# Patient Record
Sex: FEMALE | Race: WHITE | NOT HISPANIC OR LATINO | Employment: STUDENT | URBAN - NONMETROPOLITAN AREA
[De-identification: names, ages, dates, MRNs, and addresses within clinical notes are randomized per-mention and may not be internally consistent; named-entity substitution may affect disease eponyms.]

---

## 2017-01-10 ENCOUNTER — OFFICE VISIT (OUTPATIENT)
Dept: OBGYN | Facility: OTHER | Age: 22
End: 2017-01-10
Attending: NURSE PRACTITIONER
Payer: COMMERCIAL

## 2017-01-10 VITALS
SYSTOLIC BLOOD PRESSURE: 112 MMHG | HEIGHT: 62 IN | RESPIRATION RATE: 15 BRPM | DIASTOLIC BLOOD PRESSURE: 64 MMHG | HEART RATE: 89 BPM | WEIGHT: 130 LBS | OXYGEN SATURATION: 100 % | BODY MASS INDEX: 23.92 KG/M2

## 2017-01-10 DIAGNOSIS — Z30.41 ENCOUNTER FOR SURVEILLANCE OF CONTRACEPTIVE PILLS: Primary | ICD-10-CM

## 2017-01-10 PROCEDURE — 99212 OFFICE O/P EST SF 10 MIN: CPT | Performed by: NURSE PRACTITIONER

## 2017-01-10 RX ORDER — NORGESTIMATE AND ETHINYL ESTRADIOL 7DAYSX3 28
KIT ORAL
Qty: 84 TABLET | Refills: 5 | Status: SHIPPED | OUTPATIENT
Start: 2017-01-10 | End: 2018-03-04

## 2017-01-10 ASSESSMENT — PAIN SCALES - GENERAL: PAINLEVEL: NO PAIN (0)

## 2017-01-10 NOTE — NURSING NOTE
"Chief Complaint   Patient presents with     Contraception       Initial /64 mmHg  Pulse 89  Resp 15  Ht 5' 2\" (1.575 m)  Wt 130 lb (58.968 kg)  BMI 23.77 kg/m2  SpO2 100% Estimated body mass index is 23.77 kg/(m^2) as calculated from the following:    Height as of this encounter: 5' 2\" (1.575 m).    Weight as of this encounter: 130 lb (58.968 kg).  BP completed using cuff size: daniel Calixto      "

## 2017-01-10 NOTE — MR AVS SNAPSHOT
"              After Visit Summary   1/10/2017    Alice Maria    MRN: [id number]           Patient Information     Date Of Birth          1995        Visit Information        Provider Department      1/10/2017 9:45 AM Lo Ann NP [facility]        Today's Diagnoses     Encounter for surveillance of contraceptive pills    -  1       Care Instructions    We reviewed how the birthcontrol pill works and possible expected side effects.  Specifically, she was informed of the irregular bleeding pattern that can occur when the pill is first started or a new form is changed over for the first 2-3 months. She was told to call the clinic if experiencing any ill side effects or abnormal bleeding pattern persisting after 3 months.  She was instructed to start the pill the day after her next menses begins.  We also reviewed need for condom use to prevent STI's and as back-up during use of antibiotics.        Follow-ups after your visit        Who to contact     If you have questions or need follow up information about today's clinic visit or your schedule please contact [facility] directly at [phone].  Normal or non-critical lab and imaging results will be communicated to you by MyChart, letter or phone within 4 business days after the clinic has received the results. If you do not hear from us within 7 days, please contact the clinic through UberGrape[employer] or phone. If you have a critical or abnormal lab result, we will notify you by phone as soon as possible.  Submit refill requests through [employer] or call your pharmacy and they will forward the refill request to us. Please allow 3 business days for your refill to be completed.          Additional Information About Your Visit        [employer] Information     [employer] lets you send messages to your doctor, view your test results, renew your prescriptions, schedule appointments and more. To sign up, go to [email or web address] . Click on \"Log " "in\" on the left side of the screen, which will take you to the Welcome page. Then click on \"Sign up Now\" on the right side of the page.     You will be asked to enter the access code listed below, as well as some personal information. Please follow the directions to create your username and password.     Your access code is: [email or web address]  Expires: 4/10/2017 10:11 AM     Your access code will  in 90 days. If you need help or a new code, please call your [place] clinic or [phone].        Care EveryWhere ID     This is your Care EveryWhere ID. This could be used by other organizations to access your [place] medical records  [id number]        Your Vitals Were     Pulse Respirations Height BMI (Body Mass Index) Pulse Oximetry       89 15 5' 2\" (1.575 m) 23.77 kg/m2 100%        Blood Pressure from Last 3 Encounters:   01/10/17 112/64   11/27/15 100/60   07/15/15 110/72    Weight from Last 3 Encounters:   01/10/17 130 lb (58.968 kg)   11/27/15 130 lb (58.968 kg) (55.48 %*)   07/15/15 132 lb (59.875 kg) (60.59 %*)     * Growth percentiles are based on CDC 2-20 Years data.              Today, you had the following     No orders found for display         Where to get your medicines      These medications were sent to [facility] [address] AT [facility]  [address] [facility] [address]     Phone:  [phone]    - norgestim-eth estrad triphasic 0.18/0.215/0.25 MG-35 MCG per tablet       Primary Care Provider Office Phone # Fax #    Laz Dalal [phone]       [facility] [address]  [facility] [address]        Thank you!     Thank you for choosing [facility]  for your care. Our goal is always to provide you with excellent care. Hearing back from our patients is one way we can continue to improve our services. Please take a few minutes to complete the written survey that you may receive in the mail after your visit " with us. Thank you!             Your Updated Medication List - Protect others around you: Learn how to safely use, store and throw away your medicines at www.disposemymeds.org.          This list is accurate as of: 1/10/17 10:11 AM.  Always use your most recent med list.                   Brand Name Dispense Instructions for use    EPINEPHrine 0.3 MG/0.3ML injection    EPIPEN    4 each    Inject 0.3 mLs (0.3 mg) into the muscle once as needed for anaphylaxis       fluticasone 50 MCG/ACT spray    FLONASE    1 Package    Spray 2 sprays into both nostrils daily       norgestim-eth estrad triphasic 0.18/0.215/0.25 MG-35 MCG per tablet    TRINESSA (28)    84 tablet    AS DIRECTED

## 2017-01-10 NOTE — PATIENT INSTRUCTIONS
We reviewed how the birthcontrol pill works and possible expected side effects.  Specifically, she was informed of the irregular bleeding pattern that can occur when the pill is first started or a new form is changed over for the first 2-3 months. She was told to call the clinic if experiencing any ill side effects or abnormal bleeding pattern persisting after 3 months.  She was instructed to start the pill the day after her next menses begins.  We also reviewed need for condom use to prevent STI's and as back-up during use of antibiotics.

## 2017-01-10 NOTE — PROGRESS NOTES
"[facility]                HPI   Doing well.  Denies concerns.  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  Sexually active and using condoms faithfully.  Declines std screening.  Periods regular and 4 days.  She wishes to stay on her oral BCP. Discussed plan to begin pap smear at age 22.  Immunizations are up to date.              Medications:     Current Outpatient Prescriptions Ordered in Epic   Medication     norgestim-eth estrad triphasic (TRINESSA, 28,) 0.18/0.215/0.25 MG-35 MCG per tablet     fluticasone (FLONASE) 50 MCG/ACT nasal spray     EPINEPHrine (EPIPEN) 0.3 MG/0.3ML injection     [DISCONTINUED] norgestim-eth estrad triphasic (TRINESSA, 28,) 0.18/0.215/0.25 MG-35 MCG per tablet     No current Epic-ordered facility-administered medications on file.                Allergies:   Sulfa drugs         Review of Systems:   The 5 point Review of Systems is negative other than noted in the HPI                     Physical Exam:   Blood pressure 112/64, pulse 89, resp. rate 15, height 5' 2\" (1.575 m), weight 130 lb (58.968 kg), SpO2 100 %, not currently breastfeeding.  Constitutional:   awake, alert, cooperative, no apparent distress, and appears stated age               Assessment and Plan:   Contraceptive management - BCP refilled.  Teaching provided.  discussed STI's and prevention.  Return after her birthday for pap and breast exam.      JOVANY Gonzales  1/10/2017  10:06 AM  "

## 2017-05-15 ENCOUNTER — APPOINTMENT (OUTPATIENT)
Dept: OCCUPATIONAL MEDICINE | Facility: OTHER | Age: 22
End: 2017-05-15

## 2017-05-15 PROCEDURE — 86580 TB INTRADERMAL TEST: CPT

## 2017-06-12 ENCOUNTER — ALLIED HEALTH/NURSE VISIT (OUTPATIENT)
Dept: ALLERGY | Facility: OTHER | Age: 22
End: 2017-06-12
Attending: FAMILY MEDICINE
Payer: COMMERCIAL

## 2017-06-12 DIAGNOSIS — Z13.9 SCREENING FOR CONDITION: Primary | ICD-10-CM

## 2017-06-12 PROCEDURE — 86580 TB INTRADERMAL TEST: CPT

## 2017-06-12 NOTE — MR AVS SNAPSHOT
"              After Visit Summary   6/12/2017    Alice Maria    MRN: [id number]           Patient Information     Date Of Birth          1995        Visit Information        Provider Department      6/12/2017 9:15 AM HC SHOT ROOM [facility]        Today's Diagnoses     Screening for condition    -  1       Follow-ups after your visit        Your next 10 appointments already scheduled     Jun 14, 2017  9:30 AM CDT   injection with HC SHOT ROOM   [facility] [place] ([facility] [place] [facility])    [address]  [place] [address]   [phone]            Jun 19, 2017  9:15 AM CDT   injection with HC SHOT ROOM   [facility] [place] ([facility] [place] [facility])    [address]  [place] [address]   [phone]            Jun 21, 2017  9:30 AM CDT   injection with HC SHOT ROOM   [facility] [place] ([facility] [place] [facility])    [address]  [place] [address]   [phone]              Who to contact     If you have questions or need follow up information about today's clinic visit or your schedule please contact [facility] directly at [phone].  Normal or non-critical lab and imaging results will be communicated to you by Quietymehart, letter or phone within 4 business days after the clinic has received the results. If you do not hear from us within 7 days, please contact the clinic through [employer] or phone. If you have a critical or abnormal lab result, we will notify you by phone as soon as possible.  Submit refill requests through [employer] or call your pharmacy and they will forward the refill request to us. Please allow 3 business days for your refill to be completed.          Additional Information About Your Visit        MyChart Information     [employer] lets you send messages to your doctor, view your test results, renew your prescriptions, schedule appointments and more. To sign up, go to [email or web address] . Click on \"Log in\" on the left side of the screen, which " "will take you to the Welcome page. Then click on \"Sign up Now\" on the right side of the page.     You will be asked to enter the access code listed below, as well as some personal information. Please follow the directions to create your username and password.     Your access code is: [email or web address]  Expires: 9/10/2017  9:15 AM     Your access code will  in 90 days. If you need help or a new code, please call your [place] clinic or [phone].        Care EveryWhere ID     This is your Care EveryWhere ID. This could be used by other organizations to access your [place] medical records  [id number]         Blood Pressure from Last 3 Encounters:   01/10/17 112/64   11/27/15 100/60   07/15/15 110/72    Weight from Last 3 Encounters:   01/10/17 130 lb (59 kg)   11/27/15 130 lb (59 kg) (55 %)*   07/15/15 132 lb (59.9 kg) (61 %)*     * Growth percentiles are based on CDC 2-20 Years data.              We Performed the Following     INJECTION INTRAMUSCULAR OR SUB-Q     TB INTRADERMAL TEST        Primary Care Provider Office Phone # Fax #    Laz Dalal [phone]       [facility] [address]  [facility] [address]        Thank you!     Thank you for choosing [facility]  for your care. Our goal is always to provide you with excellent care. Hearing back from our patients is one way we can continue to improve our services. Please take a few minutes to complete the written survey that you may receive in the mail after your visit with us. Thank you!             Your Updated Medication List - Protect others around you: Learn how to safely use, store and throw away your medicines at www.disposemymeds.org.          This list is accurate as of: 17  9:15 AM.  Always use your most recent med list.                   Brand Name Dispense Instructions for use    EPINEPHrine 0.3 MG/0.3ML injection    EPIPEN    4 each    Inject 0.3 mLs (0.3 mg) into the muscle once as needed for " anaphylaxis       fluticasone 50 MCG/ACT spray    FLONASE    1 Package    Spray 2 sprays into both nostrils daily       norgestim-eth estrad triphasic 0.18/0.215/0.25 MG-35 MCG per tablet    TRINESSA (28)    84 tablet    AS DIRECTED

## 2017-06-12 NOTE — NURSING NOTE
Prior to injection verified patient identity using patient's name and date of birth.  Per orders of Dr. Dalal, injection of mantoux serum  given by Kimberley Da Silva. Patient instructed to remain in clinic for 20 minutes afterwards, and to report any adverse reaction to me immediately, patient declined signed out AMA.      Kimberley Da Silva LPN

## 2017-08-09 ENCOUNTER — OFFICE VISIT (OUTPATIENT)
Dept: FAMILY MEDICINE | Facility: OTHER | Age: 22
End: 2017-08-09
Attending: FAMILY MEDICINE
Payer: COMMERCIAL

## 2017-08-09 VITALS
HEIGHT: 63 IN | SYSTOLIC BLOOD PRESSURE: 114 MMHG | DIASTOLIC BLOOD PRESSURE: 66 MMHG | WEIGHT: 135 LBS | BODY MASS INDEX: 23.92 KG/M2 | TEMPERATURE: 97 F | HEART RATE: 54 BPM

## 2017-08-09 DIAGNOSIS — R30.0 DYSURIA: Primary | ICD-10-CM

## 2017-08-09 LAB
ALBUMIN UR-MCNC: NEGATIVE MG/DL
APPEARANCE UR: CLEAR
BACTERIA #/AREA URNS HPF: ABNORMAL /HPF
BILIRUB UR QL STRIP: NEGATIVE
COLOR UR AUTO: ABNORMAL
GLUCOSE UR STRIP-MCNC: NEGATIVE MG/DL
HGB UR QL STRIP: NEGATIVE
KETONES UR STRIP-MCNC: NEGATIVE MG/DL
LEUKOCYTE ESTERASE UR QL STRIP: ABNORMAL
NITRATE UR QL: NEGATIVE
PH UR STRIP: 7 PH (ref 4.7–8)
RBC #/AREA URNS AUTO: 1 /HPF (ref 0–2)
SP GR UR STRIP: 1 (ref 1–1.03)
URN SPEC COLLECT METH UR: ABNORMAL
UROBILINOGEN UR STRIP-MCNC: NORMAL MG/DL (ref 0–2)
WBC #/AREA URNS AUTO: 1 /HPF (ref 0–2)

## 2017-08-09 PROCEDURE — 87086 URINE CULTURE/COLONY COUNT: CPT | Performed by: FAMILY MEDICINE

## 2017-08-09 PROCEDURE — 81001 URINALYSIS AUTO W/SCOPE: CPT | Performed by: FAMILY MEDICINE

## 2017-08-09 PROCEDURE — 87088 URINE BACTERIA CULTURE: CPT | Performed by: FAMILY MEDICINE

## 2017-08-09 PROCEDURE — 99213 OFFICE O/P EST LOW 20 MIN: CPT | Performed by: FAMILY MEDICINE

## 2017-08-09 PROCEDURE — 87186 SC STD MICRODIL/AGAR DIL: CPT | Performed by: FAMILY MEDICINE

## 2017-08-09 RX ORDER — CIPROFLOXACIN 250 MG/1
250 TABLET, FILM COATED ORAL 2 TIMES DAILY
Qty: 6 TABLET | Refills: 0 | Status: SHIPPED | OUTPATIENT
Start: 2017-08-09 | End: 2018-04-02

## 2017-08-09 RX ORDER — NITROFURANTOIN 25; 75 MG/1; MG/1
100 CAPSULE ORAL 2 TIMES DAILY
Qty: 14 CAPSULE | Refills: 0 | Status: SHIPPED | OUTPATIENT
Start: 2017-08-09 | End: 2018-04-02

## 2017-08-09 ASSESSMENT — ANXIETY QUESTIONNAIRES
1. FEELING NERVOUS, ANXIOUS, OR ON EDGE: NOT AT ALL
4. TROUBLE RELAXING: NOT AT ALL
5. BEING SO RESTLESS THAT IT IS HARD TO SIT STILL: NOT AT ALL
2. NOT BEING ABLE TO STOP OR CONTROL WORRYING: NOT AT ALL
3. WORRYING TOO MUCH ABOUT DIFFERENT THINGS: NOT AT ALL
7. FEELING AFRAID AS IF SOMETHING AWFUL MIGHT HAPPEN: NOT AT ALL
GAD7 TOTAL SCORE: 0
6. BECOMING EASILY ANNOYED OR IRRITABLE: NOT AT ALL

## 2017-08-09 ASSESSMENT — PATIENT HEALTH QUESTIONNAIRE - PHQ9: SUM OF ALL RESPONSES TO PHQ QUESTIONS 1-9: 1

## 2017-08-09 ASSESSMENT — PAIN SCALES - GENERAL: PAINLEVEL: NO PAIN (0)

## 2017-08-09 NOTE — PROGRESS NOTES
"SUBJECTIVE:  Alice Maria, 21 year old, female is seen urinary frequency and urgency. Present over the last 2 days.  No associated symptoms.    No fever, flank pain, nausea, vomiting, dysuria, hematuria, pneumaturia, nocturia, frequency, urgency, or incontinence.      Current Outpatient Prescriptions   Medication Sig Dispense Refill     norgestim-eth estrad triphasic (TRINESSA, 28,) 0.18/0.215/0.25 MG-35 MCG per tablet AS DIRECTED 84 tablet 5     fluticasone (FLONASE) 50 MCG/ACT nasal spray Spray 2 sprays into both nostrils daily 1 Package 11     EPINEPHrine (EPIPEN) 0.3 MG/0.3ML injection Inject 0.3 mLs (0.3 mg) into the muscle once as needed for anaphylaxis 4 each 2        Allergies   Allergen Reactions     Sulfa Drugs Rash     Sulfa (sulfonamide antibiotics)       Past Medical History:   Diagnosis Date     Allergic Rhinitis, Seasonal 4/15/2011     Asthma      Eczema 4/15/2011     Past Surgical History:   Procedure Laterality Date     PE tube placement  1997     Family History   Problem Relation Age of Onset     Asthma Mother      C.A.D. Maternal Grandmother      Lipids Maternal Grandmother      Other - See Comments Maternal Grandmother      ALENAAWILLY. Paternal Grandfather      Social History     Social History     Marital status: Single     Spouse name: N/A     Number of children: N/A     Years of education: N/A     Occupational History     Not on file.     Social History Main Topics     Smoking status: Never Smoker     Smokeless tobacco: Never Used     Alcohol use No     Drug use: Not on file     Sexual activity: Not on file     Other Topics Concern     Caffeine Concern No     Social History Narrative    Age 17 years 6 months    Primary language English                 Review Of Systems  Constitutional, HEENT, cardiovascular, pulmonary, gi and gu systems are negative, except as otherwise noted.      OBJECTIVE:/66  Pulse 54  Temp 97  F (36.1  C)  Ht 5' 2.75\" (1.594 m)  Wt 135 lb (61.2 kg)  BMI 24.11 " kg/m2      Exam:  Physical Exam   Constitutional: She is oriented to person, place, and time. She appears well-developed and well-nourished. No distress.   Neurological: She is alert and oriented to person, place, and time.   Psychiatric: She has a normal mood and affect.     Other exam not repeated    Labs:  [facility]/Nurse Visit on 05/26/2015   Component Date Value Ref Range Status     PPD Induration 05/28/2015 0  0 - 5 mm Final     PPD Redness 05/28/2015 0  mm Final       ASSESSMENT/PLAN:  Dysuria  UA confirms.  Cipro as written.  Macrobid as back up due to her traveling [place].  Routine follow up.  - UA with Microscopic reflex to Culture - [place]  - Urine Culture Aerobic Bacterial  - ciprofloxacin (CIPRO) 250 MG tablet; Take 1 tablet (250 mg) by mouth 2 times daily  - nitroFURantoin, macrocrystal-monohydrate, (MACROBID) 100 MG capsule; Take 1 capsule (100 mg) by mouth 2 times daily            Laz Dalal MD

## 2017-08-09 NOTE — MR AVS SNAPSHOT
"              After Visit Summary   2017    Alice Maria    MRN: [id number]           Patient Information     Date Of Birth          1995        Visit Information        Provider Department      2017 9:20 AM Laz Dalal MD [facility]        Today's Diagnoses     Dysuria    -  1      Care Instructions    Take Cipro twice daily          Follow-ups after your visit        Follow-up notes from your care team     Return if symptoms worsen or fail to improve.      Who to contact     If you have questions or need follow up information about today's clinic visit or your schedule please contact [facility] directly at [phone].  Normal or non-critical lab and imaging results will be communicated to you by Illumagearhart, letter or phone within 4 business days after the clinic has received the results. If you do not hear from us within 7 days, please contact the clinic through [employer] or phone. If you have a critical or abnormal lab result, we will notify you by phone as soon as possible.  Submit refill requests through [employer] or call your pharmacy and they will forward the refill request to us. Please allow 3 business days for your refill to be completed.          Additional Information About Your Visit        MyChart Information     [employer] lets you send messages to your doctor, view your test results, renew your prescriptions, schedule appointments and more. To sign up, go to [email or web address] . Click on \"Log in\" on the left side of the screen, which will take you to the Welcome page. Then click on \"Sign up Now\" on the right side of the page.     You will be asked to enter the access code listed below, as well as some personal information. Please follow the directions to create your username and password.     Your access code is: [email or web address]  Expires: 9/10/2017  9:15 AM     Your access code will  in 90 days. If you need help or a new code, please call your [place] " "clinic or [phone].        Care EveryWhere ID     This is your Care EveryWhere ID. This could be used by other organizations to access your [place] medical records  [id number]        Your Vitals Were     Pulse Temperature Height BMI (Body Mass Index)          54 97  F (36.1  C) 5' 2.75\" (1.594 m) 24.11 kg/m2         Blood Pressure from Last 3 Encounters:   08/09/17 114/66   01/10/17 112/64   11/27/15 100/60    Weight from Last 3 Encounters:   08/09/17 135 lb (61.2 kg)   01/10/17 130 lb (59 kg)   11/27/15 130 lb (59 kg) (55 %)*     * Growth percentiles are based on CDC 2-20 Years data.              We Performed the Following     UA with Microscopic reflex to Culture - [place]     Urine Culture Aerobic Bacterial          Today's Medication Changes          These changes are accurate as of: 8/9/17 11:59 PM.  If you have any questions, ask your nurse or doctor.               Start taking these medicines.        Dose/Directions    ciprofloxacin 250 MG tablet   Commonly known as:  CIPRO   Used for:  Dysuria   Started by:  Laz Dalal MD        Dose:  250 mg   Take 1 tablet (250 mg) by mouth 2 times daily   Quantity:  6 tablet   Refills:  0       nitroFURantoin (macrocrystal-monohydrate) 100 MG capsule   Commonly known as:  MACROBID   Used for:  Dysuria   Started by:  Laz Dalal MD        Dose:  100 mg   Take 1 capsule (100 mg) by mouth 2 times daily   Quantity:  14 capsule   Refills:  0            Where to get your medicines      These medications were sent to [facility] [place] - [address] [address]     Phone:  [phone]     nitroFURantoin (macrocrystal-monohydrate) 100 MG capsule         These medications were sent to [facility] [address] [place] - [address] AT [facility] [address], [place] [address]     Phone:  [phone]     ciprofloxacin 250 MG tablet                Primary Care Provider Office Phone # Fax #    " Laz Dalal [phone]       [facility] [address]  [facility] [address]        Equal Access to Services     DEREK WATKINS : Hadii chao lugo kam Amezcua, wacesarda ejvilma, benyta kaalmada risa, brian annabellein hayaan sugarmeo still layovanyladonna carrasco. So [facility] [phone].    ATENCIÓN: Si habla español, tiene a lewis disposición servicios gratuitos de asistencia lingüística. Llame al [phone].    We comply with applicable federal civil rights laws and [place] laws. We do not discriminate on the basis of race, color, national origin, age, disability sex, sexual orientation or gender identity.            Thank you!     Thank you for choosing [facility]  for your care. Our goal is always to provide you with excellent care. Hearing back from our patients is one way we can continue to improve our services. Please take a few minutes to complete the written survey that you may receive in the mail after your visit with us. Thank you!             Your Updated Medication List - Protect others around you: Learn how to safely use, store and throw away your medicines at www.disposemymeds.org.          This list is accurate as of: 8/9/17 11:59 PM.  Always use your most recent med list.                   Brand Name Dispense Instructions for use Diagnosis    ciprofloxacin 250 MG tablet    CIPRO    6 tablet    Take 1 tablet (250 mg) by mouth 2 times daily    Dysuria       EPINEPHrine 0.3 MG/0.3ML injection    EPIPEN    4 each    Inject 0.3 mLs (0.3 mg) into the muscle once as needed for anaphylaxis    Multiple respiratory allergies, Seasonal allergic rhinitis       fluticasone 50 MCG/ACT spray    FLONASE    1 Package    Spray 2 sprays into both nostrils daily    Seasonal allergic rhinitis, Multiple respiratory allergies       nitroFURantoin (macrocrystal-monohydrate) 100 MG capsule    MACROBID    14 capsule    Take 1 capsule (100 mg) by mouth 2 times daily    Dysuria       norgestim-eth estrad  triphasic 0.18/0.215/0.25 MG-35 MCG per tablet    TRINESSA (28)    84 tablet    AS DIRECTED    Encounter for surveillance of contraceptive pills

## 2017-08-09 NOTE — NURSING NOTE
"Chief Complaint   Patient presents with     UTI     frequency and urgency, no burning but discomfort, no abdominal or back pain, no fevers started yesterday       Initial /66  Pulse 54  Temp 97  F (36.1  C)  Ht 5' 2.75\" (1.594 m)  Wt 135 lb (61.2 kg)  BMI 24.11 kg/m2 Estimated body mass index is 24.11 kg/(m^2) as calculated from the following:    Height as of this encounter: 5' 2.75\" (1.594 m).    Weight as of this encounter: 135 lb (61.2 kg).  Medication Reconciliation: complete     Juan Lanza      "

## 2017-08-10 ASSESSMENT — ANXIETY QUESTIONNAIRES: GAD7 TOTAL SCORE: 0

## 2017-08-11 LAB
BACTERIA SPEC CULT: ABNORMAL
MICRO REPORT STATUS: ABNORMAL
MICROORGANISM SPEC CULT: ABNORMAL
SPECIMEN SOURCE: ABNORMAL

## 2017-11-03 ENCOUNTER — HOSPITAL ENCOUNTER (EMERGENCY)
Facility: HOSPITAL | Age: 22
Discharge: LEFT BEFORE DISCHARGE | End: 2017-11-03
Admitting: FAMILY MEDICINE
Payer: COMMERCIAL

## 2017-11-03 ENCOUNTER — TELEPHONE (OUTPATIENT)
Dept: FAMILY MEDICINE | Facility: OTHER | Age: 22
End: 2017-11-03

## 2017-11-03 VITALS
BODY MASS INDEX: 23.92 KG/M2 | SYSTOLIC BLOOD PRESSURE: 144 MMHG | TEMPERATURE: 98.4 F | HEIGHT: 62 IN | DIASTOLIC BLOOD PRESSURE: 64 MMHG | WEIGHT: 130 LBS | HEART RATE: 60 BPM | OXYGEN SATURATION: 100 % | RESPIRATION RATE: 12 BRPM

## 2017-11-03 PROCEDURE — 40000268 ZZH STATISTIC NO CHARGES

## 2017-11-03 NOTE — TELEPHONE ENCOUNTER
3:29 PM    Reason for Call: OVERBOOK    Patient is having the following symptoms: Nausea/dizziness for 1 days.    The patient is requesting an appointment for today with Dr Dalal.    Was an appointment offered for this call? No, pt wanted to be seen today but we did not have anything open  If yes : Appointment type              Date    Preferred method for responding to this message: Telephone Call  What is your phone number ?  [phone]    If we cannot reach you directly, may we leave a detailed response at the number you provided? Yes    Can this message wait until your PCP/provider returns, if unavailable today? Not applicable    Alma Monique

## 2017-11-03 NOTE — TELEPHONE ENCOUNTER
Please schedule patient for date/time: Unable to see. Please advise to visit ER/UC    Have patient go to ER/Urgent Care Center. Urgent Care hours are 9:30 am to 8 pm, open 7 days a week. Yes.    Provider will call patient.No.    Other:

## 2017-11-26 ENCOUNTER — HEALTH MAINTENANCE LETTER (OUTPATIENT)
Age: 22
End: 2017-11-26

## 2018-03-04 DIAGNOSIS — Z30.41 ENCOUNTER FOR SURVEILLANCE OF CONTRACEPTIVE PILLS: ICD-10-CM

## 2018-03-05 RX ORDER — NORGESTIMATE-ETHINYL ESTRADIOL 7DAYSX3 28
TABLET ORAL
Qty: 84 TABLET | Refills: 1 | Status: SHIPPED | OUTPATIENT
Start: 2018-03-05 | End: 2018-04-02

## 2018-03-05 NOTE — TELEPHONE ENCOUNTER
TRINESSA TABLETS 28S    Last Written Prescription Date:  1/10/2017  Last Fill Quantity: 84,   # refills: 5  Last Office Visit: 8/09/2017

## 2018-03-06 DIAGNOSIS — Z30.41 ENCOUNTER FOR SURVEILLANCE OF CONTRACEPTIVE PILLS: ICD-10-CM

## 2018-03-07 RX ORDER — NORGESTIMATE-ETHINYL ESTRADIOL 7DAYSX3 28
TABLET ORAL
Qty: 84 TABLET | Refills: 0 | Status: SHIPPED | OUTPATIENT
Start: 2018-03-07 | End: 2018-04-02

## 2018-04-02 ENCOUNTER — OFFICE VISIT (OUTPATIENT)
Dept: OBGYN | Facility: OTHER | Age: 23
End: 2018-04-02
Attending: ADVANCED PRACTICE MIDWIFE
Payer: COMMERCIAL

## 2018-04-02 VITALS
BODY MASS INDEX: 23.92 KG/M2 | HEIGHT: 62 IN | DIASTOLIC BLOOD PRESSURE: 74 MMHG | SYSTOLIC BLOOD PRESSURE: 118 MMHG | HEART RATE: 73 BPM | WEIGHT: 130 LBS | OXYGEN SATURATION: 99 %

## 2018-04-02 DIAGNOSIS — Z30.41 ENCOUNTER FOR SURVEILLANCE OF CONTRACEPTIVE PILLS: ICD-10-CM

## 2018-04-02 DIAGNOSIS — Z01.419 WELL WOMAN EXAM WITH ROUTINE GYNECOLOGICAL EXAM: Primary | ICD-10-CM

## 2018-04-02 DIAGNOSIS — Z12.4 SCREENING FOR CERVICAL CANCER: ICD-10-CM

## 2018-04-02 PROCEDURE — 99395 PREV VISIT EST AGE 18-39: CPT | Performed by: ADVANCED PRACTICE MIDWIFE

## 2018-04-02 PROCEDURE — 88142 CYTOPATH C/V THIN LAYER: CPT | Performed by: ADVANCED PRACTICE MIDWIFE

## 2018-04-02 RX ORDER — NORGESTIMATE AND ETHINYL ESTRADIOL 7DAYSX3 28
KIT ORAL
Qty: 84 TABLET | Refills: 4 | Status: SHIPPED | OUTPATIENT
Start: 2018-04-02 | End: 2018-12-12

## 2018-04-02 ASSESSMENT — ANXIETY QUESTIONNAIRES
GAD7 TOTAL SCORE: 1
IF YOU CHECKED OFF ANY PROBLEMS ON THIS QUESTIONNAIRE, HOW DIFFICULT HAVE THESE PROBLEMS MADE IT FOR YOU TO DO YOUR WORK, TAKE CARE OF THINGS AT HOME, OR GET ALONG WITH OTHER PEOPLE: NOT DIFFICULT AT ALL
3. WORRYING TOO MUCH ABOUT DIFFERENT THINGS: NOT AT ALL
6. BECOMING EASILY ANNOYED OR IRRITABLE: NOT AT ALL
7. FEELING AFRAID AS IF SOMETHING AWFUL MIGHT HAPPEN: NOT AT ALL
4. TROUBLE RELAXING: SEVERAL DAYS
1. FEELING NERVOUS, ANXIOUS, OR ON EDGE: NOT AT ALL
5. BEING SO RESTLESS THAT IT IS HARD TO SIT STILL: NOT AT ALL
2. NOT BEING ABLE TO STOP OR CONTROL WORRYING: NOT AT ALL

## 2018-04-02 ASSESSMENT — PAIN SCALES - GENERAL: PAINLEVEL: NO PAIN (0)

## 2018-04-02 NOTE — PROGRESS NOTES
FRANKLIN Jenkins is a 22 year old G0   Patient's last menstrual period was 03/29/2018.  Menses:  Cycles 28 When did they start 16 How many days bleed 4 days with one heavy pain very mild Contraception Combined oral contraceptive (RINKU).  Planning pregnancy: n  Concerns in addition to routine health maintenance?  Vegetarian .  GYNECOLOGIC HISTORY:   Surgery: n  History sexually transmitted infections:No STD history   Safe?  y  STI testing offered?  n  History of abnormal Pap smear: n  Problems with sex? (bleeding/pain)n  Family history of: breast cancer: n   Uterine cancer: n ovarian cancer: n   colon cancer: n    HEALTH MAINTENANCE:  Cholesterol: (No results found for: CHOL History of abnormal lipids: n  Mammo: n   Regular Self Breast Exams: n Calcium/Vitamin D or Vitamin: y Exercise running, swimming, biking    TSH: (No results found for: TSH )  Pap; (No results found for: PAP )    HISTORY:  Obstetric History     No data available        Past Medical History:   Diagnosis Date     Allergic Rhinitis, Seasonal 4/15/2011     Asthma      Eczema 4/15/2011     Past Surgical History:   Procedure Laterality Date     PE tube placement  1997     Family History   Problem Relation Age of Onset     Asthma Mother      C.A.D. Maternal Grandmother      Lipids Maternal Grandmother      Other - See Comments Maternal Grandmother      BAMBI.A.FATMATA. Paternal Grandfather      Social History     Social History     Marital status: Single     Spouse name: N/A     Number of children: N/A     Years of education: N/A     Social History Main Topics     Smoking status: Never Smoker     Smokeless tobacco: Never Used     Alcohol use No     Drug use: None     Sexual activity: Not Asked     Other Topics Concern     Caffeine Concern No     Social History Narrative    Age 17 years 6 months    Primary language English               Current Outpatient Prescriptions:      Ferrous Sulfate (IRON SUPPLEMENT PO), Take by mouth daily, Disp: , Rfl:      TRINESSA, Diana,  "0.18/0.215/0.25 MG-35 MCG per tablet, USE AS DIRECTED, Disp: 84 tablet, Rfl: 1     fluticasone (FLONASE) 50 MCG/ACT nasal spray, Spray 2 sprays into both nostrils daily, Disp: 1 Package, Rfl: 11     EPINEPHrine (EPIPEN) 0.3 MG/0.3ML injection, Inject 0.3 mLs (0.3 mg) into the muscle once as needed for anaphylaxis, Disp: 4 each, Rfl: 2     [DISCONTINUED] TRINESSA, 28, 0.18/0.215/0.25 MG-35 MCG per tablet, USE AS DIRECTED (Patient not taking: Reported on 4/2/2018), Disp: 84 tablet, Rfl: 0     Allergies   Allergen Reactions     Sulfa Drugs Rash     Sulfa (sulfonamide antibiotics)       Past medical, surgical, social and family history were reviewed and updated in [facility].    ROS:    CONSTITUTIONAL:     NEGATIVE for fever, chills, change in weight  INTEGUMENTARY/SKIN:       NEGATIVE for worrisome rashes, moles or lesions  EYES:     NEGATIVE for vision changes or irritation  ENT/MOUTH: NEGATIVE for ear, mouth and throat problems  RESP:     NEGATIVE for significant cough or SOB  CV:   NEGATIVE for chest pain, palpitations or peripheral edema  GI:     NEGATIVE for nausea, abdominal pain, heartburn, or change in bowel habits  :   NEGATIVE for frequency, dysuria, hematuria, vaginal discharge, or irregular bleeding,incontinence   MUSCULOSKELETAL:     NEGATIVE for significant arthralgias or myalgia  NEURO:      NEGATIVE for weakness, dizziness or paresthesias  ENDOCRINE:      NEGATIVE for temperature intolerance, skin/hair changes  PSYCHIATRIC:      NEGATIVE for changes in mood or affect.     EXAM:   /74 (BP Location: Left arm, Patient Position: Chair, Cuff Size: Adult Regular)  Pulse 73  Ht 5' 2\" (1.575 m)  Wt 130 lb (59 kg)  LMP 03/29/2018  SpO2 99%  BMI 23.78 kg/m2   BMI: Body mass index is 23.78 kg/(m^2).  Constitutional: healthy, alert and no distress  Head: Normocephalic. No masses, lesions, tenderness or abnormalities  Neck: Neck supple. Trachea midline. No adenopathy. Thyroid symmetric, normal size. "   Cardiovascular: RRR.   Respiratory: lungs clear   Breast: Breasts reveal mild symmetric fibrocystic densities, but there are no dominant, discrete, fixed or suspicious masses found.  Gastrointestinal: Abdomen soft, non-tender, non-distended. No masses, organomegaly.  :  Vulva:  No external lesions, normal female hair distribution, no inguinal adenopathy.    Urethra:  Midline, non-tender, well supported, no discharge  Vagina:  Moist, pink, no abnormal discharge, no lesions  Cervix: clean   Uterus:  Normal size, non-tender, freely mobile  Ovaries:  No masses appreciated  Rectal Exam: deferred  Musculoskeletal: extremities normal  Skin: no suspicious lesions or rashes  Psychiatric: Affect appropriate, cooperative,mentation appears normal.     COUNSELING:   regular exercise  healthy diet/nutrition  Immunizations   contraception  family planning  Folic Acid Counseling     reports that she has never smoked. She has never used smokeless tobacco.  Tobacco Cessation Action Plan: na  Body mass index is 23.78 kg/(m^2).  Weight management plan: continue to eat healthy and exercise  FRAX Risk Assessment    ASSESSMENT:  22 year old female with satisfactory annual exam  Need of cervical cancer screening  Need of breast cancer screening  Trinessa  PO daily  Vegetarian  Taking OTC iron  Hgb 13.3 in 11/17  Sexually active  Declines STI screening with education  Rec'd flu shot  PLAN:   Pap   Provider breast exam  Offered STI screening  OTC Folic acid 400-800 mcg daily  Refill RINKU    Return to office: 1 year for annual    Greater than 30 minutes spent discussing    SARAHI Ulloa, ALICE

## 2018-04-02 NOTE — MR AVS SNAPSHOT
"              After Visit Summary   4/2/2018    Alice Maria    MRN: [id number]           Patient Information     Date Of Birth          1995        Visit Information        Provider Department      4/2/2018 2:00 PM Jordan Reyes APRN CNM [facility]        Today's Diagnoses     Well woman exam with routine gynecological exam    -  1    Encounter for surveillance of contraceptive pills        Screening for cervical cancer          Care Instructions    Return in one year for annual visit and as needed.    Thank you for allowing Jordan MCCLAIN CNM and our OB team to participate in your care.  If you have a scheduling or an appointment question please contact [facility] Coordinator at their direct line [phone].   ALL nursing questions or concerns can be directed to your OB nurse at: [phone] - leah              Follow-ups after your visit        Who to contact     If you have questions or need follow up information about today's clinic visit or your schedule please contact [facility] directly at [phone].  Normal or non-critical lab and imaging results will be communicated to you by [email or web address], letter or phone within 4 business days after the clinic has received the results. If you do not hear from us within 7 days, please contact the clinic through Oneflaret or phone. If you have a critical or abnormal lab result, we will notify you by phone as soon as possible.  Submit refill requests through Groove or call your pharmacy and they will forward the refill request to us. Please allow 3 business days for your refill to be completed.          Additional Information About Your Visit        [email or web address] Information     Groove lets you send messages to your doctor, view your test results, renew your prescriptions, schedule appointments and more. To sign up, go to [email or web address] . Click on \"Log in\" on the left side of the screen, which will take you to the " "Welcome page. Then click on \"Sign up Now\" on the right side of the page.     You will be asked to enter the access code listed below, as well as some personal information. Please follow the directions to create your username and password.     Your access code is: [email or web address]  Expires: 2018  1:57 PM     Your access code will  in 90 days. If you need help or a new code, please call your [facility] clinic or [phone].        Care EveryWhere ID     This is your Care EveryWhere ID. This could be used by other organizations to access your [facility] medical records  [id number]        Your Vitals Were     Pulse Height Last Period Pulse Oximetry BMI (Body Mass Index)       73 5' 2\" (1.575 m) 2018 99% 23.78 kg/m2        Blood Pressure from Last 3 Encounters:   18 118/74   17 144/64   17 114/66    Weight from Last 3 Encounters:   18 130 lb (59 kg)   17 130 lb (59 kg)   17 135 lb (61.2 kg)              We Performed the Following     A pap thin layer screen only - recommended age 21 - 24 years          Today's Medication Changes          These changes are accurate as of 18  4:58 PM.  If you have any questions, ask your nurse or doctor.               These medicines have changed or have updated prescriptions.        Dose/Directions    norgestim-eth estrad triphasic 0.18/0.215/0.25 MG-35 MCG per tablet   Commonly known as:  TRINESSA (28)   This may have changed:  See the new instructions.   Used for:  Encounter for surveillance of contraceptive pills   Changed by:  Jordan Reyes APRN CNM        USE AS DIRECTED   Quantity:  84 tablet   Refills:  4            Where to get your medicines      These medications were sent to [facility] [address] [facility] [place] [address] AT [facility] [place] [address] [facility] [facility] [address]     Phone:  [phone]     norgestim-eth estrad triphasic 0.18/0.215/0.25 MG-35 MCG per tablet                Primary Care " Provider Office Phone # Fax #    Laz Dalal [phone]       [facility] [facility] [address]        Equal Access to Services     DEREK WATKINS : Hadii aad ku hadtrinashannan Zaraeugene, wacesarda angelesriya, benyta kamissael lodr, brian peresladonna wootenmeo still zaid carrasco. So [facility] [phone].    ATENCIÓN: Si habla español, tiene a lewis disposición servicios gratuitos de asistencia lingüística. Llame al [phone].    We comply with applicable federal civil rights laws and [place] laws. We do not discriminate on the basis of race, color, national origin, age, disability, sex, sexual orientation, or gender identity.            Thank you!     Thank you for choosing [facility]  for your care. Our goal is always to provide you with excellent care. Hearing back from our patients is one way we can continue to improve our services. Please take a few minutes to complete the written survey that you may receive in the mail after your visit with us. Thank you!             Your Updated Medication List - Protect others around you: Learn how to safely use, store and throw away your medicines at www.disposemymeds.org.          This list is accurate as of 4/2/18  4:58 PM.  Always use your most recent med list.                   Brand Name Dispense Instructions for use Diagnosis    EPINEPHrine 0.3 MG/0.3ML injection    EPIPEN    4 each    Inject 0.3 mLs (0.3 mg) into the muscle once as needed for anaphylaxis    Multiple respiratory allergies, Seasonal allergic rhinitis       fluticasone 50 MCG/ACT spray    FLONASE    1 Package    Spray 2 sprays into both nostrils daily    Seasonal allergic rhinitis, Multiple respiratory allergies       IRON SUPPLEMENT PO      Take by mouth daily        norgestim-eth estrad triphasic 0.18/0.215/0.25 MG-35 MCG per tablet    TRINESSA (28)    84 tablet    USE AS DIRECTED    Encounter for surveillance of contraceptive pills

## 2018-04-02 NOTE — PATIENT INSTRUCTIONS
Return in one year for annual visit and as needed.    Thank you for allowing Jordan MCCLAIN CNM and our OB team to participate in your care.  If you have a scheduling or an appointment question please contact [facility] Health Unit Coordinator at their direct line [phone].   ALL nursing questions or concerns can be directed to your OB nurse at: [phone] - Xiomy

## 2018-04-02 NOTE — NURSING NOTE
"Chief Complaint   Patient presents with     Gyn Exam     Contraception       Initial /74 (BP Location: Left arm, Patient Position: Chair, Cuff Size: Adult Regular)  Pulse 73  Ht 5' 2\" (1.575 m)  Wt 130 lb (59 kg)  LMP 03/29/2018  SpO2 99%  BMI 23.78 kg/m2 Estimated body mass index is 23.78 kg/(m^2) as calculated from the following:    Height as of this encounter: 5' 2\" (1.575 m).    Weight as of this encounter: 130 lb (59 kg).  Medication Reconciliation: inocencia Marte      "

## 2018-04-03 ASSESSMENT — ANXIETY QUESTIONNAIRES: GAD7 TOTAL SCORE: 1

## 2018-04-03 ASSESSMENT — PATIENT HEALTH QUESTIONNAIRE - PHQ9: SUM OF ALL RESPONSES TO PHQ QUESTIONS 1-9: 0

## 2018-04-09 LAB
COPATH REPORT: NORMAL
PAP: NORMAL

## 2018-08-15 ENCOUNTER — ALLIED HEALTH/NURSE VISIT (OUTPATIENT)
Dept: ALLERGY | Facility: OTHER | Age: 23
End: 2018-08-15
Attending: FAMILY MEDICINE
Payer: COMMERCIAL

## 2018-08-15 DIAGNOSIS — Z23 NEED FOR VACCINATION: Primary | ICD-10-CM

## 2018-08-15 PROCEDURE — 90716 VAR VACCINE LIVE SUBQ: CPT

## 2018-08-15 PROCEDURE — 90471 IMMUNIZATION ADMIN: CPT

## 2018-10-28 ENCOUNTER — HOSPITAL ENCOUNTER (EMERGENCY)
Facility: CLINIC | Age: 23
Discharge: HOME | End: 2018-10-28
Attending: EMERGENCY MEDICINE | Admitting: EMERGENCY MEDICINE
Payer: COMMERCIAL

## 2018-10-28 VITALS
BODY MASS INDEX: 23.78 KG/M2 | HEART RATE: 65 BPM | RESPIRATION RATE: 20 BRPM | OXYGEN SATURATION: 99 % | WEIGHT: 130 LBS | TEMPERATURE: 96.5 F | SYSTOLIC BLOOD PRESSURE: 122 MMHG | DIASTOLIC BLOOD PRESSURE: 80 MMHG

## 2018-10-28 DIAGNOSIS — S81.012A LACERATION OF LEFT KNEE, INITIAL ENCOUNTER: ICD-10-CM

## 2018-10-28 PROCEDURE — 99283 EMERGENCY DEPT VISIT LOW MDM: CPT | Performed by: EMERGENCY MEDICINE

## 2018-10-28 PROCEDURE — 99283 EMERGENCY DEPT VISIT LOW MDM: CPT | Mod: 25 | Performed by: EMERGENCY MEDICINE

## 2018-10-28 PROCEDURE — 12002 RPR S/N/AX/GEN/TRNK2.6-7.5CM: CPT | Performed by: EMERGENCY MEDICINE

## 2018-10-28 PROCEDURE — 12002 RPR S/N/AX/GEN/TRNK2.6-7.5CM: CPT | Mod: Z6 | Performed by: EMERGENCY MEDICINE

## 2018-10-28 RX ORDER — LIDOCAINE HYDROCHLORIDE 10 MG/ML
10 INJECTION, SOLUTION INFILTRATION; PERINEURAL ONCE
Status: DISCONTINUED | OUTPATIENT
Start: 2018-10-28 | End: 2018-10-28 | Stop reason: HOSPADM

## 2018-10-28 NOTE — ED AVS SNAPSHOT
[facility], Emergency Department    [address]    [facility] [place] [address]    Phone:  [phone]    Fax:  [phone]                                       Alice Maria   MRN: [id number]    Department:  [facility], Emergency Department   Date of Visit:  10/28/2018           Patient Information     Date Of Birth          1995        Your diagnoses for this visit were:     Laceration of left knee, initial encounter        You were seen by Jason England MD.        Discharge Instructions       Please have your sutures removed in 7 days.     Extremity Laceration: Stitches, Staples, or Tape  A laceration is a cut through the skin. If it is deep, it may require stitches or staples to close so it can heal. Minor cuts may be treated with surgical tape closures, or skin glue.  X-rays may be done if something may have entered the skin through the cut. You may also need a tetanus shot if you are not up to date on this vaccine.  Home care    Follow the healthcare provider s instructions on how to care for the cut.    Wash your hands with soap and warm water before and after caring for your wound. This is to help prevent infection.    Keep the wound clean and dry. If a bandage was applied and it becomes wet or dirty, replace it. Otherwise, leave it in place for the first 24 hours, then change it once a day or as directed.    If stitches or staples were used, clean the wound daily:  ? After removing the bandage, wash the area with soap and water. Use a wet cotton swab to loosen and remove any blood or crust that forms.  ? After cleaning, keep the wound clean and dry. Talk with your healthcare provider before putting any antibiotic ointment on the wound. Reapply the bandage.    You may remove the bandage to shower as usual after the first 24 hours, but don't soak the area in water (no swimming) until the stitches or staples are removed.    If surgical tape closures were used, keep the area clean and  dry. If it becomes wet, blot it dry with a towel. Let the surgical tape fall off on its own.    The healthcare provider may prescribe an antibiotic cream or ointment to prevent infection. He or she may also prescribe an antibiotic pill. Don't stop taking this medicine until you have finished it all or the provider tells you to stop.    The provider may also prescribe medicine for pain. Follow the instructions for taking these medicines.    Don't do activities that may reopen your wound.  Follow-up care  Follow up with your healthcare provider, or as advised. Most skin wounds heal within 10 days. But an infection may sometimes occur even with proper treatment. Check the wound daily for the signs of infection listed below. Stitches and staples should be removed within 7 to14 days. If surgical tape closures were used, you may remove them after 10 days if they have not fallen off by then.   When to seek medical advice  Call your healthcare provider right away if any of these occur:    Wound bleeding not controlled by direct pressure    Signs of infection, including increasing pain in the wound, increasing wound redness or swelling, or pus or bad odor coming from the wound    Fever of 100.4 F (38 C) or higher, or as directed by your healthcare provider    Stitches or staples come apart or fall out or surgical tape falls off before 7 days    Wound edges reopen    Wound changes colors    Numbness occurs around the wound     Decreased movement around the injured area  Date Last Reviewed: 7/1/2017 2000-2017 [employer]. [address] [place] [address]. All rights reserved. This information is not intended as a substitute for professional medical care. Always follow your healthcare professional's instructions.          24 Hour Appointment Hotline       To make an appointment at any [facility], call [phone] ([phone]). If you don't have a family doctor or clinic, we will help you  find one. [facility] clinics are conveniently located to serve the needs of you and your family.             Review of your medicines      Our records show that you are taking the medicines listed below. If these are incorrect, please call your family doctor or clinic.        Dose / Directions Last dose taken    EPINEPHrine 0.3 MG/0.3ML injection   Commonly known as:  EPIPEN   Dose:  0.3 mg   Quantity:  4 each        Inject 0.3 mLs (0.3 mg) into the muscle once as needed for anaphylaxis   Refills:  2        fluticasone 50 MCG/ACT spray   Commonly known as:  FLONASE   Dose:  2 spray   Quantity:  1 Package        Spray 2 sprays into both nostrils daily   Refills:  11        IRON SUPPLEMENT PO        Take by mouth daily   Refills:  0        norgestim-eth estrad triphasic 0.18/0.215/0.25 MG-35 MCG per tablet   Commonly known as:  TRINESSA (28)   Quantity:  84 tablet        USE AS DIRECTED   Refills:  4                Orders Needing Specimen Collection     None      Pending Results     No orders found from 10/26/2018 to 10/29/2018.            Pending Culture Results     No orders found from 10/26/2018 to 10/29/2018.            Pending Results Instructions     If you had any lab results that were not finalized at the time of your Discharge, you can call the ED Lab Result RN at [phone]. You will be contacted by this team for any positive Lab results or changes in treatment. The nurses are available 7 days a week from 10A to 6:30P.  You can leave a message 24 hours per day and they will return your call.        Thank you for choosing [facility]       Thank you for choosing [facility] for your care. Our goal is always to provide you with excellent care. Hearing back from our patients is one way we can continue to improve our services. Please take a few minutes to complete the written survey that you may receive in the mail after you visit with us. Thank you!        [employer] Information     [employer] lets you send messages to  "your doctor, view your test results, renew your prescriptions, schedule appointments and more. To sign up, go to [email or web address] . Click on \"Log in\" on the left side of the screen, which will take you to the Welcome page. Then click on \"Sign up Now\" on the right side of the page.     You will be asked to enter the access code listed below, as well as some personal information. Please follow the directions to create your username and password.     Your access code is: [email or web address]  Expires: 2018 11:53 AM     Your access code will  in 90 days. If you need help or a new code, please call your [facility] clinic or [phone].        Care EveryWhere ID     This is your Care EveryWhere ID. This could be used by other organizations to access your [facility] medical records  [id number]        Equal Access to Services     [facility]: Hadii chao Amezcua, warobert walls, angelina kaalmamitzi lord, brian mar . So [facility] [phone].    ATENCIÓN: Si habla español, tiene a lewis disposición servicios gratuitos de asistencia lingüística. Llame al [phone].    We comply with applicable federal civil rights laws and [place] laws. We do not discriminate on the basis of race, color, national origin, age, disability, sex, sexual orientation, or gender identity.            After Visit Summary       This is your record. Keep this with you and show to your community pharmacist(s) and doctor(s) at your next visit.                  "

## 2018-10-28 NOTE — ED AVS SNAPSHOT
[facility], Emergency Department    [address]    [facility] [address]    Phone:  [phone]    Fax:  [phone]                                       Alice Maria   MRN: [id number]    Department:  [facility], Emergency Department   Date of Visit:  10/28/2018           After Visit Summary Signature Page     I have received my discharge instructions, and my questions have been answered. I have discussed any challenges I see with this plan with the nurse or doctor.    ..........................................................................................................................................  Patient/Patient Representative Signature      ..........................................................................................................................................  Patient Representative Print Name and Relationship to Patient    ..................................................               ................................................  Date                                   Time    ..........................................................................................................................................  Reviewed by Signature/Title    ...................................................              ..............................................  Date                                               Time          22EPIC Rev 08/18

## 2018-10-29 NOTE — DISCHARGE INSTRUCTIONS
Please have your sutures removed in 7 days.     Extremity Laceration: Stitches, Staples, or Tape  A laceration is a cut through the skin. If it is deep, it may require stitches or staples to close so it can heal. Minor cuts may be treated with surgical tape closures, or skin glue.  X-rays may be done if something may have entered the skin through the cut. You may also need a tetanus shot if you are not up to date on this vaccine.  Home care    Follow the healthcare provider s instructions on how to care for the cut.    Wash your hands with soap and warm water before and after caring for your wound. This is to help prevent infection.    Keep the wound clean and dry. If a bandage was applied and it becomes wet or dirty, replace it. Otherwise, leave it in place for the first 24 hours, then change it once a day or as directed.    If stitches or staples were used, clean the wound daily:  ? After removing the bandage, wash the area with soap and water. Use a wet cotton swab to loosen and remove any blood or crust that forms.  ? After cleaning, keep the wound clean and dry. Talk with your healthcare provider before putting any antibiotic ointment on the wound. Reapply the bandage.    You may remove the bandage to shower as usual after the first 24 hours, but don't soak the area in water (no swimming) until the stitches or staples are removed.    If surgical tape closures were used, keep the area clean and dry. If it becomes wet, blot it dry with a towel. Let the surgical tape fall off on its own.    The healthcare provider may prescribe an antibiotic cream or ointment to prevent infection. He or she may also prescribe an antibiotic pill. Don't stop taking this medicine until you have finished it all or the provider tells you to stop.    The provider may also prescribe medicine for pain. Follow the instructions for taking these medicines.    Don't do activities that may reopen your wound.  Follow-up care  Follow up with  your healthcare provider, or as advised. Most skin wounds heal within 10 days. But an infection may sometimes occur even with proper treatment. Check the wound daily for the signs of infection listed below. Stitches and staples should be removed within 7 to14 days. If surgical tape closures were used, you may remove them after 10 days if they have not fallen off by then.   When to seek medical advice  Call your healthcare provider right away if any of these occur:    Wound bleeding not controlled by direct pressure    Signs of infection, including increasing pain in the wound, increasing wound redness or swelling, or pus or bad odor coming from the wound    Fever of 100.4 F (38 C) or higher, or as directed by your healthcare provider    Stitches or staples come apart or fall out or surgical tape falls off before 7 days    Wound edges reopen    Wound changes colors    Numbness occurs around the wound     Decreased movement around the injured area  Date Last Reviewed: 7/1/2017 2000-2017 [employer]. [address] [place] [address], [place] [address]. All rights reserved. This information is not intended as a substitute for professional medical care. Always follow your healthcare professional's instructions.
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
## 2018-12-12 DIAGNOSIS — Z30.41 ENCOUNTER FOR SURVEILLANCE OF CONTRACEPTIVE PILLS: ICD-10-CM

## 2018-12-12 RX ORDER — NORGESTIMATE AND ETHINYL ESTRADIOL 7DAYSX3 28
KIT ORAL
Qty: 84 TABLET | Refills: 4 | Status: SHIPPED | OUTPATIENT
Start: 2018-12-12 | End: 2020-10-08

## 2018-12-12 NOTE — TELEPHONE ENCOUNTER
Pt needs a new prescription of her birth control pills. She is switching pharmacies.   Thank you!

## 2019-01-09 ENCOUNTER — TRANSFERRED RECORDS (OUTPATIENT)
Dept: HEALTH INFORMATION MANAGEMENT | Facility: CLINIC | Age: 24
End: 2019-01-09

## 2019-09-03 ENCOUNTER — OFFICE VISIT (OUTPATIENT)
Dept: FAMILY MEDICINE | Facility: CLINIC | Age: 24
End: 2019-09-03
Payer: COMMERCIAL

## 2019-09-03 VITALS
OXYGEN SATURATION: 100 % | BODY MASS INDEX: 23.74 KG/M2 | DIASTOLIC BLOOD PRESSURE: 80 MMHG | RESPIRATION RATE: 16 BRPM | TEMPERATURE: 99.1 F | HEART RATE: 61 BPM | HEIGHT: 62 IN | WEIGHT: 129 LBS | SYSTOLIC BLOOD PRESSURE: 122 MMHG

## 2019-09-03 DIAGNOSIS — N10 PYELONEPHRITIS, ACUTE: Primary | ICD-10-CM

## 2019-09-03 LAB
ALBUMIN UR-MCNC: NEGATIVE MG/DL
APPEARANCE UR: CLEAR
BASOPHILS # BLD AUTO: 0 10E9/L (ref 0–0.2)
BASOPHILS NFR BLD AUTO: 0.5 %
BILIRUB UR QL STRIP: NEGATIVE
COLOR UR AUTO: YELLOW
DIFFERENTIAL METHOD BLD: NORMAL
EOSINOPHIL # BLD AUTO: 0.2 10E9/L (ref 0–0.7)
EOSINOPHIL NFR BLD AUTO: 2.4 %
ERYTHROCYTE [DISTWIDTH] IN BLOOD BY AUTOMATED COUNT: 12.7 % (ref 10–15)
GLUCOSE UR STRIP-MCNC: NEGATIVE MG/DL
HCT VFR BLD AUTO: 38.1 % (ref 35–47)
HGB BLD-MCNC: 13.2 G/DL (ref 11.7–15.7)
HGB UR QL STRIP: ABNORMAL
KETONES UR STRIP-MCNC: NEGATIVE MG/DL
LEUKOCYTE ESTERASE UR QL STRIP: NEGATIVE
LYMPHOCYTES # BLD AUTO: 2.8 10E9/L (ref 0.8–5.3)
LYMPHOCYTES NFR BLD AUTO: 42.6 %
MCH RBC QN AUTO: 31.4 PG (ref 26.5–33)
MCHC RBC AUTO-ENTMCNC: 34.6 G/DL (ref 31.5–36.5)
MCV RBC AUTO: 91 FL (ref 78–100)
MONOCYTES # BLD AUTO: 0.6 10E9/L (ref 0–1.3)
MONOCYTES NFR BLD AUTO: 8.3 %
NEUTROPHILS # BLD AUTO: 3.1 10E9/L (ref 1.6–8.3)
NEUTROPHILS NFR BLD AUTO: 46.2 %
NITRATE UR QL: NEGATIVE
PH UR STRIP: 5.5 PH (ref 5–7)
PLATELET # BLD AUTO: 272 10E9/L (ref 150–450)
RBC # BLD AUTO: 4.2 10E12/L (ref 3.8–5.2)
RBC #/AREA URNS AUTO: NORMAL /HPF
SOURCE: ABNORMAL
SP GR UR STRIP: <=1.005 (ref 1–1.03)
UROBILINOGEN UR STRIP-ACNC: 0.2 EU/DL (ref 0.2–1)
WBC # BLD AUTO: 6.6 10E9/L (ref 4–11)
WBC #/AREA URNS AUTO: NORMAL /HPF

## 2019-09-03 PROCEDURE — 81001 URINALYSIS AUTO W/SCOPE: CPT | Performed by: FAMILY MEDICINE

## 2019-09-03 PROCEDURE — 85025 COMPLETE CBC W/AUTO DIFF WBC: CPT | Performed by: FAMILY MEDICINE

## 2019-09-03 PROCEDURE — 80053 COMPREHEN METABOLIC PANEL: CPT | Performed by: FAMILY MEDICINE

## 2019-09-03 PROCEDURE — 36415 COLL VENOUS BLD VENIPUNCTURE: CPT | Performed by: FAMILY MEDICINE

## 2019-09-03 PROCEDURE — 87086 URINE CULTURE/COLONY COUNT: CPT | Performed by: FAMILY MEDICINE

## 2019-09-03 PROCEDURE — 99214 OFFICE O/P EST MOD 30 MIN: CPT | Performed by: FAMILY MEDICINE

## 2019-09-03 ASSESSMENT — MIFFLIN-ST. JEOR: SCORE: 1293.39

## 2019-09-03 NOTE — NURSING NOTE
"Chief Complaint   Patient presents with     RECHECK     kidney infection while in [place] started abx sunday night     initial /80 (BP Location: Left arm, Cuff Size: Adult Regular)   Pulse 61   Temp 99.1  F (37.3  C) (Oral)   Resp 16   Ht 1.575 m (5' 2\")   Wt 58.5 kg (129 lb)   SpO2 100%   BMI 23.59 kg/m   Estimated body mass index is 23.59 kg/m  as calculated from the following:    Height as of this encounter: 1.575 m (5' 2\").    Weight as of this encounter: 58.5 kg (129 lb).  BP completed using cuff size: regular.  L  arm      Health Maintenance that is potentially due pending provider review:  NONE    n/a    Nirmal Patel ma  "

## 2019-09-03 NOTE — PROGRESS NOTES
Subjective     Alice Maria is a 24 year old female who presents to clinic today for the following health issues:    HPI   ED/UC Followup:    Facility:  In [facility] [place] [facility]  Date of visit: 9/1/19  Reason for visit: uti  Current Status: side and back pain     Pt is here today to f/u on acute pyelonephritis -   She first noticed UTI symptoms start one week ago - while on a trip in [facility]  Went to pharmacy while in [facility] - given cranberry pill with other med - homeopathic and it seemed to help a little.  Pt had some hematuria -   And cloudy urine and it was improving slightly.  She states 5 days into the urine symptoms she started to develop right flank pain.    Saturday developed right side pain -   Found hospital in [facility] - Given IV tramadol and fluids  Had CT - she brings in records today - was able to review and translate - no stones  Appeared to be pyelonepthritis  She was send home with ofloxacin 200mg BID for 7 days  She was also given Rx for paracetamol with codeine    She has since had constipation but long hx of bowel issues  She now is feeling bad again - fatigue and some left upper abdominal pain.      Typically 2 per year    -------------------------------------    Patient Active Problem List   Diagnosis     Well woman exam with routine gynecological exam     Past Surgical History:   Procedure Laterality Date     PE tube placement  1997       Social History     Tobacco Use     Smoking status: Never Smoker     Smokeless tobacco: Never Used   Substance Use Topics     Alcohol use: No     Family History   Problem Relation Age of Onset     Asthma Mother      C.A.D. Maternal Grandmother      Lipids Maternal Grandmother      Other - See Comments Maternal Grandmother      BAMBI.AWILLY. Paternal Grandfather            -------------------------------------  Reviewed and updated as needed this visit by Provider  Tobacco  Allergies  Meds  Problems  Med Hx  Surg Hx  Fam Hx         Review of Systems   ROS COMP: Constitutional,  "HEENT, cardiovascular, pulmonary, GI, , musculoskeletal, neuro, skin, endocrine and psych systems are negative, except as otherwise noted.      Objective    /80 (BP Location: Left arm, Cuff Size: Adult Regular)   Pulse 61   Temp 99.1  F (37.3  C) (Oral)   Resp 16   Ht 1.575 m (5' 2\")   Wt 58.5 kg (129 lb)   SpO2 100%   BMI 23.59 kg/m    Body mass index is 23.59 kg/m .  Physical Exam   GENERAL: healthy, alert and no distress  ABDOMEN: Soft with normal BS and some mild tenderness but no g/r/r  Positive flank tenderness on the right    Diagnostic Test Results:  Labs reviewed in Epic  Results for orders placed or performed in visit on 09/03/19 (from the past 24 hour(s))   CBC with platelets and differential   Result Value Ref Range    WBC 6.6 4.0 - 11.0 10e9/L    RBC Count 4.20 3.8 - 5.2 10e12/L    Hemoglobin 13.2 11.7 - 15.7 g/dL    Hematocrit 38.1 35.0 - 47.0 %    MCV 91 78 - 100 fl    MCH 31.4 26.5 - 33.0 pg    MCHC 34.6 31.5 - 36.5 g/dL    RDW 12.7 10.0 - 15.0 %    Platelet Count 272 150 - 450 10e9/L    % Neutrophils 46.2 %    % Lymphocytes 42.6 %    % Monocytes 8.3 %    % Eosinophils 2.4 %    % Basophils 0.5 %    Absolute Neutrophil 3.1 1.6 - 8.3 10e9/L    Absolute Lymphocytes 2.8 0.8 - 5.3 10e9/L    Absolute Monocytes 0.6 0.0 - 1.3 10e9/L    Absolute Eosinophils 0.2 0.0 - 0.7 10e9/L    Absolute Basophils 0.0 0.0 - 0.2 10e9/L    Diff Method Automated Method    *UA reflex to Microscopic and Culture ([place] [facility] (except [place] and [place])   Result Value Ref Range    Color Urine Yellow     Appearance Urine Clear     Glucose Urine Negative NEG^Negative mg/dL    Bilirubin Urine Negative NEG^Negative    Ketones Urine Negative NEG^Negative mg/dL    Specific Gravity Urine <=1.005 1.003 - 1.035    Blood Urine Trace (A) NEG^Negative    pH Urine 5.5 5.0 - 7.0 pH    Protein Albumin Urine Negative NEG^Negative mg/dL    Urobilinogen Urine 0.2 0.2 - 1.0 EU/dL    Nitrite Urine Negative " NEG^Negative    Leukocyte Esterase Urine Negative NEG^Negative    Source Midstream Urine    Urine Microscopic   Result Value Ref Range    WBC Urine 0 - 5 OTO5^0 - 5 /HPF    RBC Urine O - 2 OTO2^O - 2 /HPF     Additional labs pending        Assessment & Plan     1. Pyelonephritis, acute  Discussed this is most likely healing but since feeling worse will check some labs  Discussed having culture in case the abx is not working  Will also have her stop the paracetamol with codeine as it is probably constipating  Plan to use senna for possible constipation  Advised warning signs and symptoms    - CBC with platelets and differential  - *UA reflex to Microscopic and Culture ([facility] (except [place] and [place])  - Urine Culture Aerobic Bacterial  - Comprehensive metabolic panel (BMP + Alb, Alk Phos, ALT, AST, Total. Bili, TP)  - Urine Microscopic       Pt will call or RTC if symptoms worsen or do not improve.      Return for If symptoms worsen or do not improve.    Pita Meléndez, DO  [facility]

## 2019-09-04 LAB
ALBUMIN SERPL-MCNC: 3.7 G/DL (ref 3.4–5)
ALP SERPL-CCNC: 57 U/L (ref 40–150)
ALT SERPL W P-5'-P-CCNC: 38 U/L (ref 0–50)
ANION GAP SERPL CALCULATED.3IONS-SCNC: 10 MMOL/L (ref 3–14)
AST SERPL W P-5'-P-CCNC: 27 U/L (ref 0–45)
BACTERIA SPEC CULT: NO GROWTH
BILIRUB SERPL-MCNC: 0.3 MG/DL (ref 0.2–1.3)
BUN SERPL-MCNC: 10 MG/DL (ref 7–30)
CALCIUM SERPL-MCNC: 8.7 MG/DL (ref 8.5–10.1)
CHLORIDE SERPL-SCNC: 105 MMOL/L (ref 94–109)
CO2 SERPL-SCNC: 23 MMOL/L (ref 20–32)
CREAT SERPL-MCNC: 0.65 MG/DL (ref 0.52–1.04)
GFR SERPL CREATININE-BSD FRML MDRD: >90 ML/MIN/{1.73_M2}
GLUCOSE SERPL-MCNC: 103 MG/DL (ref 70–99)
POTASSIUM SERPL-SCNC: 3.9 MMOL/L (ref 3.4–5.3)
PROT SERPL-MCNC: 7.7 G/DL (ref 6.8–8.8)
SODIUM SERPL-SCNC: 138 MMOL/L (ref 133–144)
SPECIMEN SOURCE: NORMAL

## 2019-09-04 NOTE — RESULT ENCOUNTER NOTE
Dear Alice,   Your test results are all back -   -All of your labs are normal.  Let us know if you have any questions.  -Pita Meléndez, DO

## 2020-01-21 ENCOUNTER — OFFICE VISIT (OUTPATIENT)
Dept: ALLERGY | Facility: CLINIC | Age: 25
End: 2020-01-21
Payer: COMMERCIAL

## 2020-01-21 VITALS
OXYGEN SATURATION: 100 % | DIASTOLIC BLOOD PRESSURE: 82 MMHG | SYSTOLIC BLOOD PRESSURE: 127 MMHG | BODY MASS INDEX: 25.28 KG/M2 | WEIGHT: 138.2 LBS | HEART RATE: 59 BPM

## 2020-01-21 DIAGNOSIS — L50.8 CHRONIC URTICARIA: Primary | ICD-10-CM

## 2020-01-21 DIAGNOSIS — T78.1XXA POLLEN-FOOD ALLERGY SYNDROME, INITIAL ENCOUNTER: ICD-10-CM

## 2020-01-21 DIAGNOSIS — L50.3 DERMATOGRAPHISM: ICD-10-CM

## 2020-01-21 LAB
ALBUMIN SERPL-MCNC: 3.8 G/DL (ref 3.4–5)
ALP SERPL-CCNC: 49 U/L (ref 40–150)
ALT SERPL W P-5'-P-CCNC: 28 U/L (ref 0–50)
ANION GAP SERPL CALCULATED.3IONS-SCNC: 4 MMOL/L (ref 3–14)
AST SERPL W P-5'-P-CCNC: 16 U/L (ref 0–45)
BASOPHILS # BLD AUTO: 0 10E9/L (ref 0–0.2)
BASOPHILS NFR BLD AUTO: 0.6 %
BILIRUB SERPL-MCNC: 0.3 MG/DL (ref 0.2–1.3)
BUN SERPL-MCNC: 11 MG/DL (ref 7–30)
CALCIUM SERPL-MCNC: 9 MG/DL (ref 8.5–10.1)
CHLORIDE SERPL-SCNC: 107 MMOL/L (ref 94–109)
CO2 SERPL-SCNC: 25 MMOL/L (ref 20–32)
CREAT SERPL-MCNC: 0.67 MG/DL (ref 0.52–1.04)
DIFFERENTIAL METHOD BLD: NORMAL
EOSINOPHIL # BLD AUTO: 0.1 10E9/L (ref 0–0.7)
EOSINOPHIL NFR BLD AUTO: 2.3 %
ERYTHROCYTE [DISTWIDTH] IN BLOOD BY AUTOMATED COUNT: 12.7 % (ref 10–15)
GFR SERPL CREATININE-BSD FRML MDRD: >90 ML/MIN/{1.73_M2}
GLUCOSE SERPL-MCNC: 82 MG/DL (ref 70–99)
HCT VFR BLD AUTO: 37.7 % (ref 35–47)
HGB BLD-MCNC: 12.9 G/DL (ref 11.7–15.7)
LYMPHOCYTES # BLD AUTO: 2 10E9/L (ref 0.8–5.3)
LYMPHOCYTES NFR BLD AUTO: 38.2 %
MCH RBC QN AUTO: 30.2 PG (ref 26.5–33)
MCHC RBC AUTO-ENTMCNC: 34.2 G/DL (ref 31.5–36.5)
MCV RBC AUTO: 88 FL (ref 78–100)
MONOCYTES # BLD AUTO: 0.5 10E9/L (ref 0–1.3)
MONOCYTES NFR BLD AUTO: 10.3 %
NEUTROPHILS # BLD AUTO: 2.6 10E9/L (ref 1.6–8.3)
NEUTROPHILS NFR BLD AUTO: 48.6 %
PLATELET # BLD AUTO: 267 10E9/L (ref 150–450)
POTASSIUM SERPL-SCNC: 4.3 MMOL/L (ref 3.4–5.3)
PROT SERPL-MCNC: 7.5 G/DL (ref 6.8–8.8)
RBC # BLD AUTO: 4.27 10E12/L (ref 3.8–5.2)
SODIUM SERPL-SCNC: 136 MMOL/L (ref 133–144)
TSH SERPL DL<=0.005 MIU/L-ACNC: 3.53 MU/L (ref 0.4–4)
WBC # BLD AUTO: 5.3 10E9/L (ref 4–11)

## 2020-01-21 PROCEDURE — 99204 OFFICE O/P NEW MOD 45 MIN: CPT | Performed by: ALLERGY & IMMUNOLOGY

## 2020-01-21 PROCEDURE — 80050 GENERAL HEALTH PANEL: CPT | Performed by: ALLERGY & IMMUNOLOGY

## 2020-01-21 PROCEDURE — 36415 COLL VENOUS BLD VENIPUNCTURE: CPT | Performed by: ALLERGY & IMMUNOLOGY

## 2020-01-21 NOTE — LETTER
"    1/21/2020         RE: Alice MITCHELL  [address] [facility] [address]        Dear Colleague,    Thank you for referring your patient, Alice MITCHELL, to the [facility]. Please see a copy of my visit note below.    Alice MITCHELL was seen in the Allergy Clinic at [facility]. The following are my recommendations regarding her Food Pollen Syndrome, Chronic Urticaria and Dermatographism    1. Begin cetirizine 10mg daily - may increase up to a maximum of 20mg twice daily for persistent symptoms. In the case of sedation recommend fexofenadine 180mg daily and up to 360mg twice daily.  2. Consider addition of H2 blocker and/or LTRA for persistent symptoms  3. Will check CBC, CMP, and TSH today  4. Recommend continued avoidance of raw apples and stone fruits  5. Follow-up in 3 months, sooner if needed      Alice MITCHELL is a 24 year old White female being seen today in consultation for hives. She reports having daily symptoms of hives since last September. The hives have occurred after physical contact or having pressure applied to her skin however she has also had \"random\" episodes as well. The hives occur at various times per day and typically last 10-30 minutes before resolving. Alice has had several episodes over the course of the day. The hives seem to be more prevalent later in the day. Approximately 2 weeks ago she took diphenhydramine for 3 days in a row and noticed that she had significantly fewer hives during this time. She has not otherwise taken antihistamines for these symptoms. Alice has not had any recent changes to her home or work environments, soaps, detergents, or personal care products, or diet. She reports that she used to get hives after petting her parents cat but this resolved after she moved out.    Alice has a history of allergic rhinitis. She was tested for allergies in high school and reports positive testing for allergies to cats, dogs, molds, and pollens. She " "has symptoms around animals as well as in the spring and summer months. Her symptoms consist of itchy eyes, sneezing, rhinorrhea, and nasal congestion. She was on subcutaneous immunotherapy treatment from 2195-2444 and was transitioned to sublingual immunotherapy for 1 year when she was attending college. Alice feels that immunotherapy was helpful however she is also not frequently around triggers such as animals. She does take cetirizine as needed during the spring and summer.    Alice inquired today about possible food allergies or sensitivities. She has had symptoms of mouth and throat itching after eating apples and stone fruits. If she peels the apples or eats these fruits in cooked/baked preparations she does not have symptoms. She also reports symptoms of gassiness and bloating after eating dairy products. She recently took an at-home test to help diagnose food sensitivities and states the results showed she is sensitive to \"cow's milk casein.\" Alice denies symptoms of hives, swelling, difficulty swallowing, cough, wheezing, vomiting, dizziness, or lightheadedness after consuming cow's milk.      Past Medical History:   Diagnosis Date     Allergic Rhinitis, Seasonal 4/15/2011     Asthma      Eczema 4/15/2011     Family History   Problem Relation Age of Onset     Asthma Mother      Allergies Mother      C.A.D. Maternal Grandmother      Lipids Maternal Grandmother      Other - See Comments Maternal Grandmother      CAMACHO. Paternal Grandfather      Allergies Father      Past Surgical History:   Procedure Laterality Date     PE tube placement  1997       ENVIRONMENTAL HISTORY: [place] The home is heated with a electric furnace and forced air. They do have central air conditioning. The patient's bedroom is furnished with feather/wool bedding or pillows, carpeting in bedroom, allergen pillowcase cover and fabric window coverings.  Pets inside the house include None. There " is no history of cockroach or mice infestation. There is/are 0 smokers in the house.  The house does not have a damp basement.     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 She lives with her spouse.  Her spouse is currently in school.    REVIEW OF SYSTEMS:  General: negative for weight gain. negative for weight loss. negative for changes in sleep.   Eyes: negative for itching. negative for redness. negative for tearing/watering. negative for vision changes  Ears: negative for fullness. negative for hearing loss. negative for dizziness.   Nose: negative for snoring.negative for changes in smell. negative for drainage.   Throat: negative for hoarseness. negative for sore throat. negative for trouble swallowing.   Lungs: negative for cough. negative for shortness of breath.negative for wheezing. negative for sputum production.   Cardiovascular: negative for chest pain. negative for swelling of ankles. negative for fast or irregular heartbeat.   Gastrointestinal: negative for nausea. negative for heartburn. negative for acid reflux.   Musculoskeletal: negative for joint pain. negative for joint stiffness. negative for joint swelling.   Neurologic: negative for seizures. negative for fainting. negative for weakness.   Psychiatric: negative for changes in mood. negative for anxiety.   Endocrine: negative for cold intolerance. negative for heat intolerance. negative for tremors.   Hematologic: negative for easy bruising. negative for easy bleeding.  Integumentary: positive  for rash. negative for scaling. negative for nail changes.       Current Outpatient Medications:      CRANBERRY PO, , Disp: , Rfl:      norgestim-eth estrad triphasic (TRINESSA, 28,) 0.18/0.215/0.25 MG-35 MCG tablet, USE AS DIRECTED, Disp: 84 tablet, Rfl: 4     EPINEPHrine (EPIPEN) 0.3 MG/0.3ML injection, Inject 0.3 mLs (0.3 mg) into the muscle once as needed for anaphylaxis (Patient not taking: Reported on  1/21/2020), Disp: 4 each, Rfl: 2     fluticasone (FLONASE) 50 MCG/ACT nasal spray, Spray 2 sprays into both nostrils daily (Patient not taking: Reported on 9/3/2019), Disp: 1 Package, Rfl: 11  Immunization History   Administered Date(s) Administered     DTAP (<7y) 08/23/2000     HPV 09/08/2010, 10/21/2010, 03/15/2011     HepB 1996, 1996, 02/11/1997     Influenza (IIV3) PF 09/16/2009, 01/17/2013     MMR 11/17/1997, 08/23/2000     Mantoux Tuberculin Skin Test 05/26/2015, 06/12/2017     Meningococcal (Bexsero ) 08/24/2016     Meningococcal (Menactra ) 08/09/2011, 08/24/2016     OPV, trivalent, live 1996, 1996, 11/13/1997     Poliovirus, inactivated (IPV) 08/23/2000     TDAP Vaccine (Boostrix) 08/22/2008     Tetramune (DtP/HIB) 1996, 1996, 02/11/1997, 11/13/1997     Varicella 08/15/2018     Allergies   Allergen Reactions     Sulfa Drugs Rash     Sulfa (sulfonamide antibiotics)       EXAM:   /82 (BP Location: Left arm, Patient Position: Sitting, Cuff Size: Adult Regular)   Pulse 59   Wt 62.7 kg (138 lb 3.2 oz)   SpO2 100%   BMI 25.28 kg/m     GENERAL APPEARANCE: alert, cooperative and not in distress  SKIN: urticarial lesion on anterior neck, right side of neck, right forearm, and right ankle. 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  HEAD: atraumatic, normocephalic  EYES: lids and lashes normal, conjunctivae and sclerae clear, pupils equal, round, reactive to light, EOM full and intact  ENT: no scars or lesions, nasal exam showed no discharge, swelling or lesions noted, otoscopy showed external auditory canals clear, tympanic membranes normal, tongue midline and normal, soft palate, uvula, and tonsils normal  NECK: no asymmetry, masses, or scars, supple without significant adenopathy  LUNGS: unlabored respirations, no intercostal retractions or accessory muscle use, clear to auscultation without rales or wheezes  HEART: regular rate and rhythm without murmurs and normal S1  and S2  MUSCULOSKELETAL: no musculoskeletal defects are noted  NEURO: no focal deficits noted  PSYCH: does not appear depressed or anxious    WORKUP: None    ASSESSMENT/PLAN:  Alice MITCHELL is a 24 year old female here for evaluation of allergies. For the last 3-4 months she has experienced daily symptoms of hives. When she has taken antihistamines the frequency of her symptoms significantly decreases. Other than pressure/skin contact Alice has not identified any particular triggers for her symptoms. The frequency and duration of her symptoms are consistent with chronic urticaria. The pathophysiology and management of chronic urticaria were reviewed. We also discussed that in the majority of cases a specific cause is not identified and food and environmental allergies are typically not associated with chronic urticaria.    Alice also reports symptoms that are consistent with allergic rhinoconjunctivitis. She was previously on allergen immunotherapy treatment and now takes antihistamines as needed for acute symptoms. Additionally she has had symptoms of mouth and throat itching after eating raw apples and stone fruits though is able to tolerate these in cooked preparations. 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 Alice also experiences gastrointestinal symptoms after consuming cow's milk. This is likely due to lactose intolerance and not a sign of a true food allergy. She was counseled that IgG testing is not helpful in the diagnosis of a food allergy and has not been shown to be of benefit in diagnosing food sensitivities or intolerances.    1. Begin cetirizine 10mg daily - may increase up to a maximum of 20mg twice daily for persistent symptoms. In the case of sedation recommend fexofenadine 180mg daily and up to 360mg twice daily.  2. Consider addition of H2 blocker and/or LTRA for persistent symptoms  3. Will check CBC, CMP, and TSH today  4.  Recommend continued avoidance of raw apples and stone fruits  5. Follow-up in 3 months, sooner if needed      Thank you for allowing me to participate in the care of Alice MITCHELL.      Neeraj Jalloh MD  Allergy/Immunology  [facility]      Chart documentation done in part with Dragon Voice Recognition Software. Although reviewed after completion, some word and grammatical errors may remain.    Again, thank you for allowing me to participate in the care of your patient.        Sincerely,        Neeraj Jalloh MD

## 2020-01-21 NOTE — PATIENT INSTRUCTIONS
If you have any questions regarding your allergies, asthma, or what we discussed during your visit today please call the allergy clinic or contact us via [employer].    [facility] Allergy RN Line: [phone]  [facility] Scheduling Line: [phone]  [facility] Scheduling Line: [phone]      1. Start taking 10mg of Zyrtec (cetirizine) daily. If you continue to have hives you can increase the dose to twice daily. Maximum dose is 20mg (2 tablets) twice daily.    2. If you become sleepy with this dose of Zyrtec (cetirizine) you can switch to Allegra (fexofenadine) and take up to 2 tablets twice daily.

## 2020-01-21 NOTE — PROGRESS NOTES
"Alice MITCHELL was seen in the Allergy Clinic at [facility]. The following are my recommendations regarding her Food Pollen Syndrome, Chronic Urticaria and Dermatographism    1. Begin cetirizine 10mg daily - may increase up to a maximum of 20mg twice daily for persistent symptoms. In the case of sedation recommend fexofenadine 180mg daily and up to 360mg twice daily.  2. Consider addition of H2 blocker and/or LTRA for persistent symptoms  3. Will check CBC, CMP, and TSH today  4. Recommend continued avoidance of raw apples and stone fruits  5. Follow-up in 3 months, sooner if needed      Alice MITCHELL is a 24 year old White female being seen today in consultation for hives. She reports having daily symptoms of hives since last September. The hives have occurred after physical contact or having pressure applied to her skin however she has also had \"random\" episodes as well. The hives occur at various times per day and typically last 10-30 minutes before resolving. Alice has had several episodes over the course of the day. The hives seem to be more prevalent later in the day. Approximately 2 weeks ago she took diphenhydramine for 3 days in a row and noticed that she had significantly fewer hives during this time. She has not otherwise taken antihistamines for these symptoms. Alice has not had any recent changes to her home or work environments, soaps, detergents, or personal care products, or diet. She reports that she used to get hives after petting her parents cat but this resolved after she moved out.    Alice has a history of allergic rhinitis. She was tested for allergies in high school and reports positive testing for allergies to cats, dogs, molds, and pollens. She has symptoms around animals as well as in the spring and summer months. Her symptoms consist of itchy eyes, sneezing, rhinorrhea, and nasal congestion. She was on subcutaneous immunotherapy treatment from 6008-3908 and was transitioned " "to sublingual immunotherapy for 1 year when she was attending college. Alice feels that immunotherapy was helpful however she is also not frequently around triggers such as animals. She does take cetirizine as needed during the spring and summer.    Alice inquired today about possible food allergies or sensitivities. She has had symptoms of mouth and throat itching after eating apples and stone fruits. If she peels the apples or eats these fruits in cooked/baked preparations she does not have symptoms. She also reports symptoms of gassiness and bloating after eating dairy products. She recently took an at-home test to help diagnose food sensitivities and states the results showed she is sensitive to \"cow's milk casein.\" Alice denies symptoms of hives, swelling, difficulty swallowing, cough, wheezing, vomiting, dizziness, or lightheadedness after consuming cow's milk.      Past Medical History:   Diagnosis Date     Allergic Rhinitis, Seasonal 4/15/2011     Asthma      Eczema 4/15/2011     Family History   Problem Relation Age of Onset     Asthma Mother      Allergies Mother      C.A.FATMATA. Maternal Grandmother      Lipids Maternal Grandmother      Other - See Comments Maternal Grandmother      CAMACHO. Paternal Grandfather      Allergies Father      Past Surgical History:   Procedure Laterality Date     PE tube placement  1997       ENVIRONMENTAL HISTORY: [place] The home is heated with a electric furnace and forced air. They do have central air conditioning. The patient's bedroom is furnished with feather/wool bedding or pillows, carpeting in bedroom, allergen pillowcase cover and fabric window coverings.  Pets inside the house include None. There is no history of cockroach or mice infestation. There is/are 0 smokers in the house.  The house does not have a damp basement.     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 She lives with her " spouse.  Her spouse is currently in school.    REVIEW OF SYSTEMS:  General: negative for weight gain. negative for weight loss. negative for changes in sleep.   Eyes: negative for itching. negative for redness. negative for tearing/watering. negative for vision changes  Ears: negative for fullness. negative for hearing loss. negative for dizziness.   Nose: negative for snoring.negative for changes in smell. negative for drainage.   Throat: negative for hoarseness. negative for sore throat. negative for trouble swallowing.   Lungs: negative for cough. negative for shortness of breath.negative for wheezing. negative for sputum production.   Cardiovascular: negative for chest pain. negative for swelling of ankles. negative for fast or irregular heartbeat.   Gastrointestinal: negative for nausea. negative for heartburn. negative for acid reflux.   Musculoskeletal: negative for joint pain. negative for joint stiffness. negative for joint swelling.   Neurologic: negative for seizures. negative for fainting. negative for weakness.   Psychiatric: negative for changes in mood. negative for anxiety.   Endocrine: negative for cold intolerance. negative for heat intolerance. negative for tremors.   Hematologic: negative for easy bruising. negative for easy bleeding.  Integumentary: positive  for rash. negative for scaling. negative for nail changes.       Current Outpatient Medications:      CRANBERRY PO, , Disp: , Rfl:      norgestim-eth estrad triphasic (TRINESSA, 28,) 0.18/0.215/0.25 MG-35 MCG tablet, USE AS DIRECTED, Disp: 84 tablet, Rfl: 4     EPINEPHrine (EPIPEN) 0.3 MG/0.3ML injection, Inject 0.3 mLs (0.3 mg) into the muscle once as needed for anaphylaxis (Patient not taking: Reported on 1/21/2020), Disp: 4 each, Rfl: 2     fluticasone (FLONASE) 50 MCG/ACT nasal spray, Spray 2 sprays into both nostrils daily (Patient not taking: Reported on 9/3/2019), Disp: 1 Package, Rfl: 11  Immunization History   Administered Date(s)  Administered     DTAP (<7y) 08/23/2000     HPV 09/08/2010, 10/21/2010, 03/15/2011     HepB 1996, 1996, 02/11/1997     Influenza (IIV3) PF 09/16/2009, 01/17/2013     MMR 11/17/1997, 08/23/2000     Mantoux Tuberculin Skin Test 05/26/2015, 06/12/2017     Meningococcal (Bexsero ) 08/24/2016     Meningococcal (Menactra ) 08/09/2011, 08/24/2016     OPV, trivalent, live 1996, 1996, 11/13/1997     Poliovirus, inactivated (IPV) 08/23/2000     TDAP Vaccine (Boostrix) 08/22/2008     Tetramune (DtP/HIB) 1996, 1996, 02/11/1997, 11/13/1997     Varicella 08/15/2018     Allergies   Allergen Reactions     Sulfa Drugs Rash     Sulfa (sulfonamide antibiotics)       EXAM:   /82 (BP Location: Left arm, Patient Position: Sitting, Cuff Size: Adult Regular)   Pulse 59   Wt 62.7 kg (138 lb 3.2 oz)   SpO2 100%   BMI 25.28 kg/m    GENERAL APPEARANCE: alert, cooperative and not in distress  SKIN: urticarial lesion on anterior neck, right side of neck, right forearm, and right ankle. 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  HEAD: atraumatic, normocephalic  EYES: lids and lashes normal, conjunctivae and sclerae clear, pupils equal, round, reactive to light, EOM full and intact  ENT: no scars or lesions, nasal exam showed no discharge, swelling or lesions noted, otoscopy showed external auditory canals clear, tympanic membranes normal, tongue midline and normal, soft palate, uvula, and tonsils normal  NECK: no asymmetry, masses, or scars, supple without significant adenopathy  LUNGS: unlabored respirations, no intercostal retractions or accessory muscle use, clear to auscultation without rales or wheezes  HEART: regular rate and rhythm without murmurs and normal S1 and S2  MUSCULOSKELETAL: no musculoskeletal defects are noted  NEURO: no focal deficits noted  PSYCH: does not appear depressed or anxious    WORKUP: None    ASSESSMENT/PLAN:  Alice MITCHELL is a 24 year old female here for evaluation of  allergies. For the last 3-4 months she has experienced daily symptoms of hives. When she has taken antihistamines the frequency of her symptoms significantly decreases. Other than pressure/skin contact Alice has not identified any particular triggers for her symptoms. The frequency and duration of her symptoms are consistent with chronic urticaria. The pathophysiology and management of chronic urticaria were reviewed. We also discussed that in the majority of cases a specific cause is not identified and food and environmental allergies are typically not associated with chronic urticaria.    Alice also reports symptoms that are consistent with allergic rhinoconjunctivitis. She was previously on allergen immunotherapy treatment and now takes antihistamines as needed for acute symptoms. Additionally she has had symptoms of mouth and throat itching after eating raw apples and stone fruits though is able to tolerate these in cooked preparations. 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 Alice also experiences gastrointestinal symptoms after consuming cow's milk. This is likely due to lactose intolerance and not a sign of a true food allergy. She was counseled that IgG testing is not helpful in the diagnosis of a food allergy and has not been shown to be of benefit in diagnosing food sensitivities or intolerances.    1. Begin cetirizine 10mg daily - may increase up to a maximum of 20mg twice daily for persistent symptoms. In the case of sedation recommend fexofenadine 180mg daily and up to 360mg twice daily.  2. Consider addition of H2 blocker and/or LTRA for persistent symptoms  3. Will check CBC, CMP, and TSH today  4. Recommend continued avoidance of raw apples and stone fruits  5. Follow-up in 3 months, sooner if needed      Thank you for allowing me to participate in the care of Alice MITCHELL.      Neeraj Jalloh MD  Allergy/Immunology  [place]  [facility]      Chart documentation done in part with Dragon Voice Recognition Software. Although reviewed after completion, some word and grammatical errors may remain.

## 2020-03-02 ENCOUNTER — HEALTH MAINTENANCE LETTER (OUTPATIENT)
Age: 25
End: 2020-03-02

## 2020-03-17 ENCOUNTER — VIRTUAL VISIT (OUTPATIENT)
Dept: FAMILY MEDICINE | Facility: OTHER | Age: 25
End: 2020-03-17

## 2020-03-17 NOTE — PROGRESS NOTES
"Date: 2020 07:57:18  Clinician: Sami Samayoa  Clinician NPI: [id number]  Patient: Alice Kelly  Patient : 1995  Patient Address: [address] [place] [address]  Patient Phone: [phone]  Visit Protocol: UTI  Patient Summary:  Alice is a 24 year old ( : 1995 ) female who initiated a Visit for a presumed bladder infection. When asked the question \"Please sign me up to receive news, health information and promotions. \", Alice responded \"No\".   Her symptoms started today and consist of dysuria, foul-smelling urine, feeling as if the bladder is never empty, urinary frequency, and urgency.   Symptom details   Urine color: The color of her urine is yellow.    Denied symptoms include nausea, flank pain, abdominal pain, chills, vaginal discharge, urinary incontinence, vomiting, and vaginal itching. She does not feel feverish.   Over-the-counter medications or home remedies used to relieve the current symptoms as reported by the patient (free text): cranberry, phenazopyradine   Precipitating events  Alice denies having a sexually transmitted disease.  Pertinent medical history  Alice has had a bladder infection before and has had 1 in the past 12 months. Her most recent bladder infection was not within the last 4 weeks. Her current symptoms are similar to her previous bladder infection symptoms.   Nitrofurantoin (Macrobid) and ciprofloxacin (Cipro) has been effective in treating her past bladder infections.   She has experienced problems or side effects with the following antibiotics in the past: sulfamethoxazole-trimethoprim (Bactrim DS).  Problems or side effects as reported by the patient (free text): richa Jenkins has not been prescribed antibiotics to prevent frequent or repeated bladder infections in the past and does not get yeast infections when she takes antibiotics.   Alice does not have a history of kidney stones. She has not used a catheter or been a patient in a hospital " or nursing home in the past 2 weeks.   Alice does not smoke or use smokeless tobacco.   She denies pregnancy and denies breastfeeding. She is currently menstruating.     MEDICATIONS: cetirizine oral, ALLERGIES: Sulfa (Sulfonamide Antibiotics)  Clinician Response:  Dear Alice,  Based on the information you have provided, you likely have an acute urinary tract infection, also called a bladder infection. Bladder infections occur when bacteria from the outside of the body enters the urinary tract. Any part of the urinary system can be infected, but the bladder is the most common.  Medication information  I am prescribing:     Nitrofurantoin monohyd/m-cryst (Macrobid) 100 mg oral capsule. Take 1 capsule by mouth every 12 hours for 5 days. Take this medication with food. There are no refills with this prescription.   The medication I prescribed for your bladder infection is an antibiotic. Continue taking the medication until it is gone even if you feel better.   Yeast infections can be a common side effect of antibiotics. The most common symptom of a yeast infection is itchiness in and around the vagina. Other signs and symptoms include burning, redness, or a thick, white vaginal discharge that looks like cottage cheese and does not have a bad smell.  Self care  Urination helps to flush bacteria from the urinary tract. For this reason, drinking water and urinating often helps relieve some urinary symptoms and can decrease your risk of getting bladder infections in the future.  Other steps you can take to prevent future bladder infections include:     Wipe front to back after using the bathroom    Urinate after sexual intercourse    Avoid using deodorant sprays, douches, or powders in the vaginal area     When to seek care  Please make an appointment to be seen in a clinic or urgent care if any of the following occur:     You develop new symptoms or your symptoms become worse    You have medication side effects that make  it difficult to take them as prescribed    Your symptoms do not improve within 1-2 days of starting treatment    You have symptoms of a bladder infection that return shortly after completing treatment     It is possible to have an allergic reaction to an antibiotic even if you have not had one in the past. If you notice a new rash, significant swelling, or difficulty breathing, stop taking this medication immediately and go to a clinic or urgent care.   Diagnosis: Acute uncomplicated bladder infection  Diagnosis ICD: N39.0  Prescription: nitrofurantoin monohyd/m-cryst (Macrobid) 100 mg oral capsule 10 capsule, 5 days supply. Take 1 capsule by mouth every 12 hours for 5 days. Refills: 0, Refill as needed: no, Allow substitutions: yes  Pharmacy: [id number] - [phone] - [address] [place] [address]

## 2020-04-14 ENCOUNTER — VIRTUAL VISIT (OUTPATIENT)
Dept: FAMILY MEDICINE | Facility: OTHER | Age: 25
End: 2020-04-14

## 2020-04-14 ENCOUNTER — NURSE TRIAGE (OUTPATIENT)
Dept: NURSING | Facility: CLINIC | Age: 25
End: 2020-04-14

## 2020-04-14 NOTE — TELEPHONE ENCOUNTER
Say is having a bad asthma flare up and cough and tightness in chest.  Denies fever and denies shortness of breath.  Usually say uses a rescue inhaler Albuterol inhaler.  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  Say feels that inhaler is not helping like it use to.  Symptoms have been present for two days.  Cough is not keeping Say up at night.      COVID 19 Nurse Triage Plan/Patient Instructions    Please be aware that novel coronavirus (COVID-19) may be circulating in the community. If you develop symptoms such as fever, cough, or SOB or if you have concerns about the presence of another infection including coronavirus (COVID-19), please contact your health care provider or visit [email or web address].     Disposition/Instructions    Patient to have an [facility] Visit with a provider (Preferred option). Follow System Ambulatory Workflow for COVID 19.     To do this follow these instructions:    1. Go to the website [email or web address]  2. Create an account (you will need your insurance information)  3. Start a new visit  4. Choose your diagnosis (e.g. COVID19)  5. Fill out the information about your symptoms  6. A provider will reach out to you by text, phone call or video visit based on your request    Call Back If: Your symptoms worsen before you are able to complete your [facility] Visit with a provider.    Thank you for limiting contact with others, wearing a simple mask to cover your cough, practice good hand hygiene habits and accessing our virtual services where possible to limit the spread of this virus.    For more information about COVID19 and options for caring for yourself at home, please visit the CDC website at https://www.cdc.gov/coronavirus/2019-ncov/about/steps-when-sick.html  For more options for care at [facility], please visit our website at [email or web address]    For more information, please use the [place] Department of Health ([facility]) COVID-19 Hotlines  (Interpreters available):     Health questions: Phone Number: [phone] or 1-420.319.1987 and Hours: 7 a.m. to 7 p.m.    Schools and  questions: Phone Number: [phone] or [phone] and Hours 7 a.m. to 7 p.m.                  Reason for Disposition    1] COVID-19 infection diagnosed or suspected AND [2] mild symptoms (fever, cough) AND [2] no trouble breathing or other complications    Additional Information    Negative: SEVERE difficulty breathing (e.g., struggling for each breath, speaks in single words)    Negative: Difficult to awaken or acting confused (e.g., disoriented, slurred speech)    Negative: Bluish (or gray) lips or face now    Negative: Shock suspected (e.g., cold/pale/clammy skin, too weak to stand, low BP, rapid pulse)    Negative: Sounds like a life-threatening emergency to the triager    Negative: SEVERE or constant chest pain (Exception: mild central chest pain, present only when coughing)    Negative: MODERATE difficulty breathing (e.g., speaks in phrases, SOB even at rest, pulse 100-120)    Negative: Patient sounds very sick or weak to the triager    Negative: MILD difficulty breathing (e.g., minimal/no SOB at rest, SOB with walking, pulse <100)    Negative: Chest pain    Negative: Fever > 103 F (39.4 C)    Negative: [1] Fever > 101 F (38.3 C) AND [2] age > 60    Negative: [1] Fever > 100.0 F (37.8 C) AND [2] bedridden (e.g., nursing home patient, CVA, chronic illness, recovering from surgery)    Negative: HIGH RISK patient (e.g., age > 64 years, diabetes, heart or lung disease, weak immune system)    Negative: Fever present > 3 days (72 hours)    Negative: [1] Fever returns after gone for over 24 hours AND [2] symptoms worse or not improved    Negative: [1] Continuous (nonstop) coughing interferes with work or school AND [2] no improvement using cough treatment per protocol    Negative: Cough present > 3 weeks    Protocols used: CORONAVIRUS (COVID-19) DIAGNOSED OR  [id number] 3.30.20

## 2020-04-14 NOTE — PROGRESS NOTES
"Date: 2020 08:57:59  Clinician: Renato Wallace  Clinician NPI: [id number]  Patient: Alice Kelly  Patient : 1995  Patient Address: [address] [place] [address]  Patient Phone: [phone]  Visit Protocol: URI  Patient Summary:  Alice is a 24 year old ( : 1995 ) female who initiated a Visit for COVID-19 (Coronavirus) evaluation and screening. When asked the question \"Please sign me up to receive news, health information and promotions. \", Alice responded \"No\".    Alice states her symptoms started 1-2 days ago.   Her symptoms consist of a cough.   Symptom details   Cough: Alice coughs every 5-10 minutes and her cough is more bothersome at night. Phlegm does not come into her throat when she coughs. She believes her cough is caused by post-nasal drip.    Alice denies having rhinitis, facial pain or pressure, myalgias, enlarged lymph nodes, chills, diarrhea, vomiting, nausea, teeth pain, headache, fever, wheezing, sore throat, nasal congestion, malaise, and ear pain. She also denies having recent facial or sinus surgery in the past 60 days. She is not experiencing dyspnea.   Precipitating events  She has not recently been exposed to someone with influenza. Alice has not been in close contact with any high risk individuals.   Pertinent COVID-19 (Coronavirus) information  Alice has not traveled internationally or to the areas where COVID-19 (Coronavirus) is widespread, including cruise ship travel in the last 14 days before the start of her symptoms.   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   Alice has not had a close contact with a laboratory-confirmed COVID-19 patient within 14 days of symptom onset. She also has not had a close contact with a suspected " COVID-19 patient within 14 days of symptom onset.   Pertinent medical history  Alice has taken an antibiotic medication in the past month. Antibiotic details as reported by the patient (free text): Nitrofurantoin; UTI   Alice does not get yeast infections when she takes antibiotics.   Alice does not need a return to work/school note.   Weight: 135 lbs   Alice does not smoke or use smokeless tobacco.   She denies pregnancy and denies breastfeeding. She has menstruated in the past month.   Additional information as reported by the patient (free text): I have asthma; this feels exactly like a flare up. However, the triage nurse said because I have cough, I must go through this site. Thanks   Weight: 135 lbs    MEDICATIONS: cetirizine oral, ALLERGIES: Sulfa (Sulfonamide Antibiotics)  Clinician Response:  Dear Alice,   Dear Alice  Your symptoms show that you may have coronavirus (COVID-19). This illness can cause fever, cough and trouble breathing. Many people get a mild case and get better on their own. Some people can get very sick.   Will I be tested for COVID-19?  Because the virus is spreading, we are no longer testing most patients. You may request testing if:   You are very ill. For example, you're on chemotherapy, dialysis or home hospice care. (Contact your specialty clinic or program.)   You live in a nursing home or other long-term care facility. (Talk to your nurse manager or medical director.)   You're a health care worker. (Contact your employee health office.)   How can I protect others?  Without a test, we can't know for sure that you have COVID-19. For safety, it's very important to follow these rules.  First, stay home and away from others (self-isolate) until:   You've had no fever---and no medicine that reduces fever---for 3 full days (72 hours). And...    Your other symptoms have gotten better. For example, your cough or breathing has improved. And...   At least 7 days have passed since your symptoms  started.   During this time:   Don't go to work, school or anywhere else.    Stay away from others in your home. No hugging, kissing or shaking hands.   Don't let anyone visit.   Cover your mouth and nose with a mask, tissue or wash cloth to avoid spreading germs.   Wash your hands and face often. Use soap and water.   How can I take care of myself?   1.Take Tylenol (acetaminophen) for fever or pain. If you have liver or kidney problems, ask your family doctor if it's okay to take Tylenol.        Adults can take either:    650 mg (two 325 mg pills) every 4 to 6 hours, or...   1,000 mg (two 500 mg pills) every 8 hours as needed.    Note: Don't take more than 3,000 mg in one day.   For children, check the Tylenol bottle for the right dose. The dose is based on the child's age or weight.   2.If you have other health problems (like cancer, heart failure, an organ transplant or severe kidney disease): Call your specialty clinic if you don't feel better in the next 2 days.       3.Know when to call 911: If your breathing is so bad that it keeps you from doing normal activities, call 911 or go to the emergency room. Tell them that you've been staying home and may have COVID-19.   Where can I get more information?  To learn more about COVID-19 and how to care for yourself at home, please visit the CDC website at https://www.cdc.gov/coronavirus/2019-ncov/about/steps-when-sick.html.  For more about your care at [facility], please visit [email or web address]     Diagnosis: Cough  Diagnosis ICD: R05  Additional Clinician Notes: You are experiencing symptoms consistent with COVID.  You must reach out to occupational health for testing before returning to work.

## 2020-04-16 ENCOUNTER — TELEPHONE (OUTPATIENT)
Dept: ALLERGY | Facility: CLINIC | Age: 25
End: 2020-04-16

## 2020-04-16 ENCOUNTER — MYC MEDICAL ADVICE (OUTPATIENT)
Dept: ALLERGY | Facility: CLINIC | Age: 25
End: 2020-04-16

## 2020-04-16 VITALS — BODY MASS INDEX: 23.92 KG/M2 | WEIGHT: 130 LBS | HEIGHT: 62 IN

## 2020-04-16 RX ORDER — CETIRIZINE HYDROCHLORIDE 10 MG/1
10 TABLET ORAL DAILY
COMMUNITY
End: 2022-06-13

## 2020-04-16 ASSESSMENT — MIFFLIN-ST. JEOR: SCORE: 1297.93

## 2020-04-16 NOTE — PROGRESS NOTES
"Alice MITCHELL is a 24 year old female who is being evaluated via a billable video visit.      The patient has been notified of following:     \"This video visit will be conducted via a call between you and your physician/provider. We have found that certain health care needs can be provided without the need for an in-person physical exam.  This service lets us provide the care you need with a video conversation.  If a prescription is necessary we can send it directly to your pharmacy.  If lab work is needed we can place an order for that and you can then stop by our lab to have the test done at a later time.    Video visits are billed at different rates depending on your insurance coverage.  Please reach out to your insurance provider with any questions.    If during the course of the call the physician/provider feels a video visit is not appropriate, you will not be charged for this service.\"    Patient has given verbal consent for Video visit? Yes    How would you like to obtain your AVS? Jono    Patient would like the video invitation sent by: Send to e-mail at: [email or web address]      Video Start Time: 9:30 AM    Additional provider notes:     Alice MITCHELL is a 24 year old American female who is seen today for evaluation of asthma and follow-up of chronic urticaria. She states that she was diagnosed with exercise induced asthma when she was in 8th grade. Her only trigger had been exercise, particularly when running or exercising in cold weather, however in the last 6-8 months she has noticed an increase in shortness of breath or chest tightness. These symptom are infrequent, occurring approximately once or twice per month and will last no longer than 24 hours. These symptoms seem to be triggered by her spring and fall allergies as well as if she is out gardening or doing yard work. Alice has begun wearing a scarf or mask over her face when she is doing yard work to help prevent these symptoms. She denies having " nocturnal asthma symptoms such as cough, shortness of breath, chest tightness, or wheezing. She has been avoiding running outdoors due to the cooler weather and concerns that she may develop symptoms. When she does use albuterol Alice finds it to be effective however she needs to use it several times over the course of the day to manage these acute symptoms. She has not sought any emergent evaluation due to these symptoms.    Alice reports that her urticaria has been controlled. She noticed improvement with 10mg of cetirizine daily but occasionally has some breakthrough symptoms and will take an additional 10mg. She denies any symptoms of angioedema and notes that when she does get hives now they are not itchy or terribly bothersome.    Past Medical History:   Diagnosis Date     Allergic Rhinitis, Seasonal 4/15/2011     Asthma      Eczema 4/15/2011     Family History   Problem Relation Age of Onset     Asthma Mother      Allergies Mother      C.A.D. Maternal Grandmother      Lipids Maternal Grandmother      Other - See Comments Maternal Grandmother      C.A.D. Paternal Grandfather      Allergies Father      Social History     Tobacco Use     Smoking status: Never Smoker     Smokeless tobacco: Never Used   Substance Use Topics     Alcohol use: No     Drug use: None       Past medical, family, and social history were reviewed.    REVIEW OF SYSTEMS:  General: negative for weight gain. negative for weight loss. negative for changes in sleep.   Eyes: negative for itching. negative for redness. negative for tearing/watering. negative for vision changes  Ears: negative for fullness. negative for hearing loss. negative for dizziness.   Nose: negative for snoring.negative for changes in smell. negative for drainage.   Throat: negative for hoarseness. negative for sore throat. negative for trouble swallowing.   Lungs: negative for cough. positive  for shortness of breath.positive  for wheezing. negative for sputum production.  "  Cardiovascular: negative for chest pain. negative for swelling of ankles. negative for fast or irregular heartbeat.   Gastrointestinal: negative for nausea. negative for heartburn. negative for acid reflux.   Musculoskeletal: negative for joint pain. negative for joint stiffness. negative for joint swelling.   Neurologic: negative for seizures. negative for fainting. negative for weakness.   Psychiatric: negative for changes in mood. negative for anxiety.   Endocrine: negative for cold intolerance. negative for heat intolerance. negative for tremors.   Hematologic: negative for easy bruising. negative for easy bleeding.  Integumentary: positive  for rash (hives). negative for scaling. negative for nail changes.       Current Outpatient Medications:      cetirizine (ZYRTEC) 10 MG tablet, Take 10 mg by mouth daily, Disp: , Rfl:      CRANBERRY PO, , Disp: , Rfl:      EPINEPHrine (EPIPEN) 0.3 MG/0.3ML injection, Inject 0.3 mLs (0.3 mg) into the muscle once as needed for anaphylaxis (Patient not taking: Reported on 1/21/2020), Disp: 4 each, Rfl: 2     fluticasone (FLONASE) 50 MCG/ACT nasal spray, Spray 2 sprays into both nostrils daily (Patient not taking: Reported on 9/3/2019), Disp: 1 Package, Rfl: 11     norgestim-eth estrad triphasic (TRINESSA, 28,) 0.18/0.215/0.25 MG-35 MCG tablet, USE AS DIRECTED (Patient not taking: Reported on 4/16/2020), Disp: 84 tablet, Rfl: 4    EXAM:   Ht 1.575 m (5' 2\")   Wt 59 kg (130 lb)   BMI 23.78 kg/m    GENERAL APPEARANCE: alert, cooperative and not in distress  SKIN: no rashes, no lesions  HEAD: atraumatic, normocephalic  EYES: EOM full and intact  ENT: no scars or lesions, soft palate, uvula, and tonsils normal, palpation of salivary glands negative  NECK: no asymmetry, masses, or scars, full range of motion  LUNGS: unlabored respirations, no intercostal retractions or accessory muscle use, speaking comfortably in full sentences, no cough  MUSCULOSKELETAL: no musculoskeletal " defects are noted  NEURO: no focal deficits noted, oriented for age x3  PSYCH: does not appear depressed or anxious      WORKUP:  None    ASSESSMENT/PLAN:  Alice MITCHELL is a 24 year old female seen for evaluation of asthma and follow-up of chronic urticaria.    1. Mild intermittent asthma without complication - increased symptoms occurring 1 to 2 days per month, not lasting >24 hours, manageable with albuterol. We discussed potential advantages of using ICS/LABA medication containing formoterol as rescue in place of albuterol and she was in agreement with this plan.    - budesonide-formoterol (SYMBICORT) 80-4.5 MCG/ACT Inhaler; Inhale 2 puffs into the lungs every 4 hours as needed (cough, shortness of breath, wheezing)  Dispense: 1 Inhaler; Refill: 1    2. Chronic urticaria - well controlled with current medication regimen.      - continue cetirizine 10mg once to twice daily    Follow-up in 3-6 months, sooner if needed      Thank you for allowing me to participate in the care of Alice MITCHELL.      Video-Visit Details    Type of service:  Video Visit    Video End Time (time video stopped): 9:42    Originating Location (pt. Location): Home    Distant Location (provider location):  [facility]     Mode of Communication:  Video Conference via Susan Jalloh MD  Allergy/Immunology  [facility]

## 2020-04-16 NOTE — TELEPHONE ENCOUNTER
Reason for Call:  Other appointment    Detailed comments: patient is calling to request an video visit with . Please follow up with patient.     Phone Number Patient can be reached at: Cell number on file:    Telephone Information:   Mobile [phone]       Best Time: anytime     Can we leave a detailed message on this number? YES    Call taken on 4/16/2020 at 11:14 AM by Selin Jorgensen

## 2020-04-17 ENCOUNTER — VIRTUAL VISIT (OUTPATIENT)
Dept: ALLERGY | Facility: CLINIC | Age: 25
End: 2020-04-17
Payer: COMMERCIAL

## 2020-04-17 DIAGNOSIS — J45.20 MILD INTERMITTENT ASTHMA WITHOUT COMPLICATION: Primary | ICD-10-CM

## 2020-04-17 DIAGNOSIS — L50.8 CHRONIC URTICARIA: ICD-10-CM

## 2020-04-17 PROCEDURE — 99442 ZZC PHYSICIAN TELEPHONE EVALUATION 11-20 MIN: CPT | Performed by: ALLERGY & IMMUNOLOGY

## 2020-04-17 RX ORDER — BUDESONIDE AND FORMOTEROL FUMARATE DIHYDRATE 80; 4.5 UG/1; UG/1
2 AEROSOL RESPIRATORY (INHALATION) EVERY 4 HOURS PRN
Qty: 1 INHALER | Refills: 1 | Status: SHIPPED | OUTPATIENT
Start: 2020-04-17

## 2020-06-12 ENCOUNTER — MYC MEDICAL ADVICE (OUTPATIENT)
Dept: FAMILY MEDICINE | Facility: CLINIC | Age: 25
End: 2020-06-12

## 2020-06-12 NOTE — TELEPHONE ENCOUNTER
Patient Quality Outreach      Summary:    Patient is due/failing the following:   ACT needed    Type of outreach:    Sent [employer] message.    Questions for provider review:    None                                                                                   **Start Working phrase here:**       Patient has the following on her problem list/HM:   Asthma review     No flowsheet data found.                                                   Hillary MACHADO RN

## 2020-06-30 ASSESSMENT — ASTHMA QUESTIONNAIRES: ACT_TOTALSCORE: 23

## 2020-10-08 ENCOUNTER — OFFICE VISIT (OUTPATIENT)
Dept: OBGYN | Facility: OTHER | Age: 25
End: 2020-10-08
Attending: NURSE PRACTITIONER
Payer: COMMERCIAL

## 2020-10-08 VITALS
WEIGHT: 135 LBS | SYSTOLIC BLOOD PRESSURE: 124 MMHG | DIASTOLIC BLOOD PRESSURE: 80 MMHG | OXYGEN SATURATION: 100 % | BODY MASS INDEX: 24.84 KG/M2 | HEART RATE: 98 BPM | HEIGHT: 62 IN

## 2020-10-08 DIAGNOSIS — Z01.419 WELL WOMAN EXAM WITH ROUTINE GYNECOLOGICAL EXAM: ICD-10-CM

## 2020-10-08 DIAGNOSIS — Z12.4 SCREENING FOR CERVICAL CANCER: Primary | ICD-10-CM

## 2020-10-08 PROCEDURE — G0123 SCREEN CERV/VAG THIN LAYER: HCPCS | Mod: ZL | Performed by: NURSE PRACTITIONER

## 2020-10-08 PROCEDURE — G0463 HOSPITAL OUTPT CLINIC VISIT: HCPCS | Performed by: COUNSELOR

## 2020-10-08 PROCEDURE — 99395 PREV VISIT EST AGE 18-39: CPT | Performed by: NURSE PRACTITIONER

## 2020-10-08 RX ORDER — PRENATAL VIT/IRON FUM/FOLIC AC 27MG-0.8MG
1 TABLET ORAL DAILY
COMMUNITY

## 2020-10-08 SDOH — HEALTH STABILITY: MENTAL HEALTH: HOW OFTEN DO YOU HAVE 6 OR MORE DRINKS ON ONE OCCASION?: NOT ASKED

## 2020-10-08 SDOH — HEALTH STABILITY: MENTAL HEALTH: HOW OFTEN DO YOU HAVE A DRINK CONTAINING ALCOHOL?: MONTHLY OR LESS

## 2020-10-08 SDOH — HEALTH STABILITY: MENTAL HEALTH: HOW MANY STANDARD DRINKS CONTAINING ALCOHOL DO YOU HAVE ON A TYPICAL DAY?: NOT ASKED

## 2020-10-08 ASSESSMENT — MIFFLIN-ST. JEOR: SCORE: 1315.61

## 2020-10-08 ASSESSMENT — PATIENT HEALTH QUESTIONNAIRE - PHQ9: SUM OF ALL RESPONSES TO PHQ QUESTIONS 1-9: 1

## 2020-10-08 ASSESSMENT — PAIN SCALES - GENERAL: PAINLEVEL: NO PAIN (0)

## 2020-10-08 NOTE — PATIENT INSTRUCTIONS
Patient Education     Breast Health: Breast Self-Awareness  What is breast self-awareness?  Breast self-awareness is knowing how your breasts normally look and feel. Your breasts change as you go through different stages of your life. So it s important to learn what is normal for your breasts. Knowing about your breasts helps you spot any changes in them right away. Tell your healthcare provider about any changes.  Why is breast self-awareness important?  Many experts now say that women should focus on breast self-awareness instead of doing a breast self-examination (BSE). These experts include the American Cancer Society and the American Congress of Obstetricians and Gynecologists. Some experts even advise not teaching women to do a BSE. That s because research hasn t shown a clear benefit to doing BSEs.  Breast self-awareness is different than a BSE. It isn t about following a certain method and schedule. It s about knowing what's normal for your breasts. That way you can spot even small changes right away. If you see any changes, tell your healthcare provider.  Changes to look for  Call your healthcare provider if you find any changes in your breasts that worry you. These changes may be:    A lump    Nipple discharge other than breastmilk, especially if it's bloody    Swelling    A change in size or shape    Skin changes, such as redness, thickening, or dimpling of the skin    Swollen lymph nodes in the armpit    Nipple problems, such as pain or redness  If you find a lump  Call your provider if you find lumpiness in one breast. Also call if you feel something different in the tissue or feel a definite lump. Sometimes lumpiness may be due to menstrual changes. But there may be reason for concern.  Your provider may want to see you right away if you have:    Nipple discharge that is bloody    Skin changes on your breast, such as dimpling or puckering  It s okay to be upset if you find a lump. Be sure to call  your provider right away. Remember that most breast lumps are benign. This means they are not cancer.  Date Last Reviewed: 8/1/2017 2000-2019 [employer]. [address] [place] [address], [place] [address]. All rights reserved. This information is not intended as a substitute for professional medical care. Always follow your healthcare professional's instructions.

## 2020-10-08 NOTE — LETTER
October 14, 2020          Alice MITCHELL  [address] [address]        Dear Alice,        Thank you for coming to the [facility]. This letter is to inform you that your pap test  was normal. Please call the nurse at [phone], if you have questions pertaining to your results.               Sincerely,        Lo Ann NP/ Tonie HOANG

## 2020-10-08 NOTE — NURSING NOTE
"Chief Complaint   Patient presents with     Gyn Exam       Initial /80 (BP Location: Right arm, Patient Position: Sitting, Cuff Size: Adult Regular)   Pulse 98   Ht 1.575 m (5' 2\")   Wt 61.2 kg (135 lb)   SpO2 100%   BMI 24.69 kg/m   Estimated body mass index is 24.69 kg/m  as calculated from the following:    Height as of this encounter: 1.575 m (5' 2\").    Weight as of this encounter: 61.2 kg (135 lb).  Medication Reconciliation: complete     Rosie Calixto LPN    "

## 2020-10-08 NOTE — PROGRESS NOTES
CC:  Annual exam  HPI:  Alice MITCHELL is a 25 year old female P0 Patient's last menstrual period was 09/23/2020.  Periods are regular and light.  She is using condoms and NFP for contraception and  Is taking a daily PNV.  She is planning pregnancy when she has completed school as a [employer]. Healthy diet and regular exercise.   No other c/o.      Past GYN history:  No STD history  STI testing offered?  Declined       Last PAP smear:  Normal  Mammograms up to date: not applicable      Past Medical History:   Diagnosis Date     Allergic Rhinitis, Seasonal 4/15/2011     Asthma      Eczema 4/15/2011       Past Surgical History:   Procedure Laterality Date     PE tube placement  1997       Family History   Problem Relation Age of Onset     Asthma Mother      Allergies Mother      C.A.D. Maternal Grandmother      Lipids Maternal Grandmother      Other - See Comments Maternal Grandmother      CAMACHO. Paternal Grandfather      Allergies Father        Current Outpatient Medications   Medication Sig Dispense Refill     budesonide-formoterol (SYMBICORT) 80-4.5 MCG/ACT Inhaler Inhale 2 puffs into the lungs every 4 hours as needed (cough, shortness of breath, wheezing) 1 Inhaler 1     cetirizine (ZYRTEC) 10 MG tablet Take 10 mg by mouth daily       CRANBERRY PO        Prenatal Vit-Fe Fumarate-FA (PRENATAL MULTIVITAMIN W/IRON) 27-0.8 MG tablet Take 1 tablet by mouth daily       EPINEPHrine (EPIPEN) 0.3 MG/0.3ML injection Inject 0.3 mLs (0.3 mg) into the muscle once as needed for anaphylaxis (Patient not taking: Reported on 1/21/2020) 4 each 2       Allergies: Sulfa drugs    ROS:  CONSTITUTIONAL:NEGATIVE for fever, chills, change in weight  INTEGUMENTARY/SKIN: NEGATIVE for worrisome rashes, moles or lesions  RESP:NEGATIVE for significant cough or SOB  BREAST: NEGATIVE for masses, tenderness or discharge  CV: NEGATIVE for chest pain, palpitations or peripheral edema  GI: NEGATIVE for nausea, abdominal pain, heartburn, or change in bowel  "habits  : normal menstrual cycles  ENDOCRINE: NEGATIVE for temperature intolerance, skin/hair changes  PSYCHIATRIC: NEGATIVE for changes in mood or affect    EXAM:  Blood pressure 124/80, pulse 98, height 1.575 m (5' 2\"), weight 61.2 kg (135 lb), last menstrual period 09/23/2020, SpO2 100 %, not currently breastfeeding.   BMI= Body mass index is 24.69 kg/m .  General - pleasant female in no acute distress.  Neck - supple without lymphadenopathy or thyromegaly.  Lungs - clear to auscultation bilaterally.  Heart - regular rate and rhythm without murmur.  Breast - no nodularity, asymmetry or nipple discharge bilaterally.  Abdomen - soft, nontender, nondistended, no hepatosplenomegaly.  Pelvic - EG: normal adult female, BUS: within normal limits, Vagina: well rugated, no discharge, Cervix: no lesions or CMT, Uterus: firm, normal sized and nontender, Adnexae: no masses or tenderness.  Rectovaginal - deferred.  Musculoskeletal - no gross deformities.  Neurological - normal strength, sensation, and mental status.      ASSESSMENT/PLAN:  (Z12.4) Screening for cervical cancer  (primary encounter diagnosis)  Comment:   Plan: A pap thin layer screen only - recommended age         21 - 24 years            (Z01.419) Well woman exam with routine gynecological exam  Comment:   Plan: return annually and as needed      Discussed exercise and healthy eating, including calcium intake.  She should return to the clinic in one year, or sooner if problems arise.    "

## 2020-10-14 LAB
COPATH REPORT: NORMAL
PAP: NORMAL

## 2020-10-28 ENCOUNTER — OFFICE VISIT (OUTPATIENT)
Dept: FAMILY MEDICINE | Facility: OTHER | Age: 25
End: 2020-10-28
Attending: FAMILY MEDICINE
Payer: COMMERCIAL

## 2020-10-28 ENCOUNTER — NURSE TRIAGE (OUTPATIENT)
Dept: NURSING | Facility: CLINIC | Age: 25
End: 2020-10-28

## 2020-10-28 ENCOUNTER — NURSE TRIAGE (OUTPATIENT)
Dept: FAMILY MEDICINE | Facility: OTHER | Age: 25
End: 2020-10-28

## 2020-10-28 DIAGNOSIS — Z20.822 EXPOSURE TO COVID-19 VIRUS: Primary | ICD-10-CM

## 2020-10-28 DIAGNOSIS — Z20.822 EXPOSURE TO COVID-19 VIRUS: ICD-10-CM

## 2020-10-28 PROCEDURE — U0003 INFECTIOUS AGENT DETECTION BY NUCLEIC ACID (DNA OR RNA); SEVERE ACUTE RESPIRATORY SYNDROME CORONAVIRUS 2 (SARS-COV-2) (CORONAVIRUS DISEASE [COVID-19]), AMPLIFIED PROBE TECHNIQUE, MAKING USE OF HIGH THROUGHPUT TECHNOLOGIES AS DESCRIBED BY CMS-2020-01-R: HCPCS | Mod: ZL | Performed by: FAMILY MEDICINE

## 2020-10-28 NOTE — TELEPHONE ENCOUNTER
Alice is calling and states that she has been exposed to covid 19 on Sunday October 25th.  Alice states that asthma has been bothering her for 3 months now.  Noticing a cough after running.  Denies chest pain.  Alice states that she is going to have a covid test.    COVID 19 Nurse Triage Plan/Patient Instructions    Please be aware that novel coronavirus (COVID-19) may be circulating in the community. If you develop symptoms such as fever, cough, or SOB or if you have concerns about the presence of another infection including coronavirus (COVID-19), please contact your health care provider or visit [email or web address].     Disposition/Instructions    Virtual Visit with provider recommended. Reference Visit Selection Guide.    Thank you for taking steps to prevent the spread of this virus.  o Limit your contact with others.  o Wear a simple mask to cover your cough.  o Wash your hands well and often.    Resources    [facility]: About COVID-19: [email or web address]    CDC: What to Do If You're Sick: www.cdc.gov/coronavirus/2019-ncov/about/steps-when-sick.html    CDC: Ending Home Isolation: www.cdc.gov/coronavirus/2019-ncov/hcp/disposition-in-home-patients.html     CDC: Caring for Someone: www.cdc.gov/coronavirus/2019-ncov/if-you-are-sick/care-for-someone.html     [facility]: Interim Guidance for Hospital Discharge to Home: www.health.[facility].[place]/diseases/coronavirus/hcp/hospdischarge.pdf    [facility] clinical trials (COVID-19 research studies): [email or web address].[facility]/n-clinical-trials     Below are the COVID-19 hotlines at the [place] Department of Health ([facility]). Interpreters are available.   o For health questions: Call 407-451-6997 or 1-652.277.6377 (7 a.m. to 7 p.m.)  o For questions about schools and childcare: Call 821-642-9935 or 1-446.882.5463 (7 a.m. to 7 p.m.)                       Reason for Disposition    [1] COVID-19 infection suspected by caller or triager AND [2] mild symptoms  (cough, fever, or others) AND [3] no complications or SOB    Additional Information    Negative: SEVERE difficulty breathing (e.g., struggling for each breath, speaks in single words)    Negative: Difficult to awaken or acting confused (e.g., disoriented, slurred speech)    Negative: Bluish (or gray) lips or face now    Negative: Shock suspected (e.g., cold/pale/clammy skin, too weak to stand, low BP, rapid pulse)    Negative: Sounds like a life-threatening emergency to the triager    Negative: SEVERE or constant chest pain or pressure (Exception: mild central chest pain, present only when coughing)    Negative: MODERATE difficulty breathing (e.g., speaks in phrases, SOB even at rest, pulse 100-120)    Negative: Patient sounds very sick or weak to the triager    Negative: Fever present > 3 days (72 hours)    Negative: [1] Fever returns after gone for over 24 hours AND [2] symptoms worse or not improved    Negative: [1] Continuous (nonstop) coughing interferes with work or school AND [2] no improvement using cough treatment per protocol    Negative: MILD difficulty breathing (e.g., minimal/no SOB at rest, SOB with walking, pulse <100)    Negative: Chest pain or pressure    Negative: Fever > 103 F (39.4 C)    Negative: [1] Fever > 101 F (38.3 C) AND [2] age > 60    Negative: [1] Fever > 100.0 F (37.8 C) AND [2] bedridden (e.g., nursing home patient, CVA, chronic illness, recovering from surgery)    Negative: HIGH RISK patient (e.g., age > 64 years, diabetes, heart or lung disease, weak immune system) (Exception: Has already been evaluated by healthcare provider and has no new or worsening symptoms)    Protocols used: CORONAVIRUS (COVID-19) DIAGNOSED OR [id number] 8.4.20

## 2020-10-28 NOTE — TELEPHONE ENCOUNTER
"Patient is scheduled today for covid test.    Reason for Disposition    [1] COVID-19 EXPOSURE (Close Contact) AND [2] within last 14 days BUT [3] NO symptoms    Additional Information    Negative: COVID-19 has been diagnosed by a healthcare provider (HCP)    Negative: COVID-19 lab test positive    Negative: [1] Symptoms of COVID-19 (e.g., cough, fever, SOB, or others) AND [2] lives in an area with community spread    Negative: [1] Symptoms of COVID-19 (e.g., cough, fever, SOB, or others) AND [2] within 14 days of EXPOSURE (close contact) with diagnosed or suspected COVID-19 patient    Negative: [1] Symptoms of COVID-19 (e.g., cough, fever, SOB, or others) AND [2] within 14 days of travel from high-risk area for COVID-19 community spread (identified by CDC)    Negative: [1] Difficulty breathing (shortness of breath) occurs AND [2] onset > 14 days after COVID-19 EXPOSURE (Close Contact) AND [3] no community spread where patient lives    Negative: [1] Dry cough occurs AND [2] onset > 14 days after COVID-19 EXPOSURE AND [3] no community spread where patient lives    Negative: [1] Wet cough (i.e., white-yellow, yellow, green, or russ colored sputum) AND [2] onset > 14 days after COVID-19 EXPOSURE AND [3] no community spread where patient lives    Negative: [1] Common cold symptoms AND [2] onset > 14 days after COVID-19 EXPOSURE AND [3] no community spread where patient lives    Negative: [1] COVID-19 EXPOSURE (Close Contact) within last 14 days AND [2] needs COVID-19 lab test to return to work AND [3] NO symptoms    Negative: [1] COVID-19 EXPOSURE (Close Contact) within last 14 days AND [2] exposed person is a healthcare worker who was NOT using all recommended personal protective equipment (i.e., a respirator-N95 mask, eye protection, gloves, and gown) AND [3] NO symptoms    Answer Assessment - Initial Assessment Questions  1. CLOSE CONTACT: \"Who is the person with the confirmed or suspected COVID-19 infection that you " "were exposed to?\"      Mother  2. PLACE of CONTACT: \"Where were you when you were exposed to COVID-19?\" (e.g., home, school, medical waiting room; which city?)      home  3. TYPE of CONTACT: \"How much contact was there?\" (e.g., sitting next to, live in same house, work in same office, same building)      Sitting at opposite table  4. DURATION of CONTACT: \"How long were you in contact with the COVID-19 patient?\" (e.g., a few seconds, passed by person, a few minutes, live with the patient)      1 hour  5. DATE of CONTACT: \"When did you have contact with a COVID-19 patient?\" (e.g., how many days ago)      10/25  6. TRAVEL: \"Have you traveled out of the country recently?\" If so, \"When and where?\"      * Also ask about out-of-state travel, since the [facility] [place][note 2] has identified some high-risk cities for community spread in the .      * Note: Travel becomes less relevant if there is widespread community transmission where the patient lives.      No  7. COMMUNITY SPREAD: \"Are there lots of cases of COVID-19 (community spread) where you live?\" (See public health department website, if unsure)        Yes  8. SYMPTOMS: \"Do you have any symptoms?\" (e.g., fever, cough, breathing difficulty)      No  9. PREGNANCY OR POSTPARTUM: \"Is there any chance you are pregnant?\" \"When was your last menstrual period?\" \"Did you deliver in the last 2 weeks?\"      No  10. HIGH RISK: \"Do you have any heart or lung problems? Do you have a weak immune system?\" (e.g., CHF, COPD, asthma, HIV positive, chemotherapy, renal failure, diabetes mellitus, sickle cell anemia)        Asthma    Protocols used: CORONAVIRUS (COVID-19) EXPOSURE-A- 8.4.20      "

## 2020-10-29 LAB
SARS-COV-2 RNA SPEC QL NAA+PROBE: ABNORMAL
SPECIMEN SOURCE: ABNORMAL

## 2020-11-20 ENCOUNTER — MYC MEDICAL ADVICE (OUTPATIENT)
Dept: FAMILY MEDICINE | Facility: CLINIC | Age: 25
End: 2020-11-20

## 2020-12-18 ENCOUNTER — NURSE TRIAGE (OUTPATIENT)
Dept: FAMILY MEDICINE | Facility: OTHER | Age: 25
End: 2020-12-18

## 2020-12-18 NOTE — TELEPHONE ENCOUNTER
Call from pt with concerns of an ulcer. Symptoms to include burning in left upper quadrant x 3-4 days, pt states she has been treated for an ulcer in the past. Pain increases around 4-5 pm, requesting appt with provider to discuss.    Appt scheduled:    Next 5 appointments (look out 90 days)    Dec 21, 2020 10:00 AM  (Arrive by 9:45 AM)  SHORT with Laz Dalal MD  [facility] ([facility] [place] ) [address]  [place] [address]  [phone]

## 2020-12-21 ENCOUNTER — VIRTUAL VISIT (OUTPATIENT)
Dept: FAMILY MEDICINE | Facility: OTHER | Age: 25
End: 2020-12-21
Attending: FAMILY MEDICINE
Payer: MEDICAID

## 2020-12-21 VITALS — BODY MASS INDEX: 24.84 KG/M2 | HEIGHT: 62 IN | WEIGHT: 135 LBS

## 2020-12-21 DIAGNOSIS — R10.12 ABDOMINAL PAIN, LEFT UPPER QUADRANT: Primary | ICD-10-CM

## 2020-12-21 PROCEDURE — G0463 HOSPITAL OUTPT CLINIC VISIT: HCPCS | Mod: 25,TEL

## 2020-12-21 PROCEDURE — 99442 PR PHYSICIAN TELEPHONE EVALUATION 11-20 MIN: CPT | Performed by: FAMILY MEDICINE

## 2020-12-21 ASSESSMENT — PAIN SCALES - GENERAL: PAINLEVEL: NO PAIN (0)

## 2020-12-21 ASSESSMENT — MIFFLIN-ST. JEOR: SCORE: 1315.61

## 2020-12-21 NOTE — PATIENT INSTRUCTIONS
Appointment with General Surgery scheduled for evaluation and recommendations for further management. Radiology will call to schedule CT abdoment and pelvis.

## 2020-12-21 NOTE — PROGRESS NOTES
"Alice Kelly is a 25 year old female who is being evaluated via a billable telephone visit.      The patient has been notified of following:     \"This telephone visit will be conducted via a call between you and your physician/provider. We have found that certain health care needs can be provided without the need for a physical exam.  This service lets us provide the care you need with a short phone conversation.  If a prescription is necessary we can send it directly to your pharmacy.  If lab work is needed we can place an order for that and you can then stop by our lab to have the test done at a later time.    Telephone visits are billed at different rates depending on your insurance coverage. During this emergency period, for some insurers they may be billed the same as an in-person visit.  Please reach out to your insurance provider with any questions.    If during the course of the call the physician/provider feels a telephone visit is not appropriate, you will not be charged for this service.\"    Patient has given verbal consent for Telephone visit?  Yes    What phone number would you like to be contacted at? [phone]     How would you like to obtain your AVS? MyChart    Subjective     Alice Kelly is a 25 year old female who presents via phone visit today for the following health issues:    HPI     Concern - LUQ Ulcer   Onset: 5 years   Description: on and off LUQ pain   Intensity: mild  Progression of Symptoms:  same  Accompanying Signs & Symptoms: pain   Previous history of similar problem: yes HX of Ulcer   Precipitating factors:        Worsened by: empty stomach states that she feels the discomfort around 4-5 PM right before dinner   Alleviating factors:        Improved by: eating and laying on her right side   Therapies tried and outcome: Prilosec years ago , topical essential oils deep relief     Alice continues to have intermittent abdominal pain.  Present over the last several years  Located " primarily in the left upper quadrant.  She has a previous diagnosis of peptic ulcer disease clinically and has previously been on omeprazole. She has not had CT abdomen and pelvis or prior endoscopy.       Review of Systems   Constitutional, HEENT, cardiovascular, pulmonary, gi and gu systems are negative, except as otherwise noted.       Objective          Vitals:  No vitals were obtained today due to virtual visit.    healthy, alert and no distress  PSYCH: Alert and oriented times 3; coherent speech, normal   rate and volume, able to articulate logical thoughts, able   to abstract reason, no tangential thoughts, no hallucinations   or delusions  Her affect is normal  RESP: No cough, no audible wheezing, able to talk in full sentences  Remainder of exam unable to be completed due to telephone visits    Office Visit on 10/28/2020   Component Date Value Ref Range Status     COVID-19 Virus PCR to [facility] - So* 10/28/2020 Nasopharyngeal   Final     COVID-19 Virus PCR to [facility] - Re* 10/28/2020 Detected, Abnormal Result*  Final    Comment: Positive for 2019-nCoV.  Patient sample was heat inactivated and amplified using the HDPCR SARS-CoV-2   assay ([employer] The HDPCRTM SARS-CoV-2 assay is a reverse   transcription real-time polymerase chain reaction (qRT-PCR) test intended for   the qualitative detection of nucleic acid  from SARS-CoV-2 in human nasopharyngeal swabs, oropharyngeal swabs, anterior   nasal swabs, mid-turbinate nasal swabs as well as nasal aspirate, nasal wash,   and bronchoalveolar lavage (BAL) specimens from individuals who are suspected   of COVID-19 by their healthcare provider.  Positive results should also be reported in accordance with local, state, and   federal regulations.  Nasopharyngeal specimen is the preferred choice for swab-based SARS CoV2   testing. When collection of a nasopharyngeal swab is not possible the   following are acceptable alternatives:  an oropharyngeal (OP)  specimen collected by a healthcare professional, or a   nasal mid-turbinate (NMT) swab collected by a healthcar                           e professional or by   onsite self-collection (using a flocked tapered swab), or an anterior nares   specimen collected by a healthcare professional or by onsite self-collection   (using a round foam swab). (Centers for Disease Control)  Testing performed by [facility] ([facility]) [address] [facility] [address]  The test performance characteristics were determined by [facility]. It has not been   cleared or approved by the FDA.  The laboratory is regulated under the Clinical Laboratory Improvement   Amendments of 1988 (CLIA-88) as qualified to perform high-complexity testing.   This test is used for clinical purposes. It should not be regarded as   investigational or for research.                 Assessment & Plan     Abdominal pain, left upper quadrant  Discussed potential etiologes.  She has a history of probable peptic ulcer disease and has had previous proton pump inhibitor. Reviewed potential need for endoscopy as well as CT scanning.  Orders placed.  Follow up as arranged.          See Patient Instructions    No follow-ups on file.    Laz Dalal MD  [facility]    Phone call duration:  14 minutes

## 2020-12-21 NOTE — NURSING NOTE
"Chief Complaint   Patient presents with     ulcer flare up       Initial There were no vitals taken for this visit. Estimated body mass index is 24.69 kg/m  as calculated from the following:    Height as of 10/8/20: 1.575 m (5' 2\").    Weight as of 10/8/20: 61.2 kg (135 lb).  Medication Reconciliation: complete  Sandi Flores LPN  "

## 2020-12-28 ENCOUNTER — HOSPITAL ENCOUNTER (OUTPATIENT)
Dept: CT IMAGING | Facility: HOSPITAL | Age: 25
Discharge: HOME | End: 2020-12-28
Attending: FAMILY MEDICINE | Admitting: FAMILY MEDICINE
Payer: MEDICAID

## 2020-12-28 DIAGNOSIS — R10.12 ABDOMINAL PAIN, LEFT UPPER QUADRANT: ICD-10-CM

## 2020-12-28 PROCEDURE — 255N000002 HC RX 255 OP 636: Performed by: RADIOLOGY

## 2020-12-28 PROCEDURE — 74177 CT ABD & PELVIS W/CONTRAST: CPT

## 2020-12-28 RX ORDER — IOPAMIDOL 612 MG/ML
100 INJECTION, SOLUTION INTRAVASCULAR ONCE
Status: COMPLETED | OUTPATIENT
Start: 2020-12-28 | End: 2020-12-28

## 2020-12-28 RX ADMIN — IOPAMIDOL 100 ML: 612 INJECTION, SOLUTION INTRAVENOUS at 08:50

## 2020-12-29 ENCOUNTER — OFFICE VISIT (OUTPATIENT)
Dept: SURGERY | Facility: OTHER | Age: 25
End: 2020-12-29
Attending: FAMILY MEDICINE
Payer: MEDICAID

## 2020-12-29 VITALS
DIASTOLIC BLOOD PRESSURE: 68 MMHG | RESPIRATION RATE: 16 BRPM | HEIGHT: 62 IN | WEIGHT: 145 LBS | SYSTOLIC BLOOD PRESSURE: 122 MMHG | BODY MASS INDEX: 26.68 KG/M2 | TEMPERATURE: 97.6 F | OXYGEN SATURATION: 99 % | HEART RATE: 96 BPM

## 2020-12-29 DIAGNOSIS — R10.12 ABDOMINAL PAIN, LEFT UPPER QUADRANT: ICD-10-CM

## 2020-12-29 PROCEDURE — G0463 HOSPITAL OUTPT CLINIC VISIT: HCPCS

## 2020-12-29 PROCEDURE — 99204 OFFICE O/P NEW MOD 45 MIN: CPT | Performed by: SURGERY

## 2020-12-29 RX ORDER — OMEPRAZOLE 40 MG/1
40 CAPSULE, DELAYED RELEASE ORAL DAILY
Qty: 30 CAPSULE | Refills: 3 | Status: SHIPPED | OUTPATIENT
Start: 2020-12-29 | End: 2021-04-22

## 2020-12-29 ASSESSMENT — MIFFLIN-ST. JEOR: SCORE: 1360.97

## 2020-12-29 ASSESSMENT — PAIN SCALES - GENERAL: PAINLEVEL: NO PAIN (0)

## 2020-12-29 NOTE — NURSING NOTE
"Chief Complaint   Patient presents with     Consult     Abdominal Pain     upper left       Initial /68   Pulse 96   Temp 97.6  F (36.4  C) (Tympanic)   Resp 16   Ht 1.575 m (5' 2\")   Wt 65.8 kg (145 lb)   SpO2 99%   BMI 26.52 kg/m   Estimated body mass index is 26.52 kg/m  as calculated from the following:    Height as of this encounter: 1.575 m (5' 2\").    Weight as of this encounter: 65.8 kg (145 lb).  Medication Reconciliation: complete  Katie Schrader LPN    "

## 2020-12-29 NOTE — PROGRESS NOTES
CLINIC NOTE - CONSULT  12/29/2020    Patient:Alice Kelly  Referring Physician: Laz Dalal    Reason for Referral: Left upper quadrant pain    This is a 25 year old female with intermittent left upper quadrant pain.  The patient does feel that it is consistent with irritable bowel syndrome.  The patient has had a trial of proton pump inhibitors in the past but only took them for 2 weeks.  States that she did not have any pain while being on them but again only took it for 2 weeks.  She does state that when she gets the pain if she lies on her right side the pain immediately goes away.  Does have a history of constipation and diarrhea.  Fevers.  No chills.  No true reflux symptoms.    Past Medical History:  Past Medical History:   Diagnosis Date     Allergic Rhinitis, Seasonal 4/15/2011     Asthma      Eczema 4/15/2011       Past Surgical History:  Past Surgical History:   Procedure Laterality Date     PE tube placement  1997       Family History History:  Family History   Problem Relation Age of Onset     Asthma Mother      Allergies Mother      C.A.D. Maternal Grandmother      Lipids Maternal Grandmother      Other - See Comments Maternal Grandmother      BAMBI.A.D. Paternal Grandfather      Allergies Father        History of Tobacco Use:  History   Smoking Status     Never Smoker   Smokeless Tobacco     Never Used       Current Medications:  Current Outpatient Medications   Medication Sig Dispense Refill     budesonide-formoterol (SYMBICORT) 80-4.5 MCG/ACT Inhaler Inhale 2 puffs into the lungs every 4 hours as needed (cough, shortness of breath, wheezing) 1 Inhaler 1     cetirizine (ZYRTEC) 10 MG tablet Take 10 mg by mouth daily       CRANBERRY PO        omeprazole (PRILOSEC) 40 MG DR capsule Take 1 capsule (40 mg) by mouth daily 30 capsule 3     Prenatal Vit-Fe Fumarate-FA (PRENATAL MULTIVITAMIN W/IRON) 27-0.8 MG tablet Take 1 tablet by mouth daily         Allergies:  Allergies   Allergen Reactions     Sulfa  "Drugs Rash     Sulfa (sulfonamide antibiotics)     RADIOLOGY:    Results for orders placed during the hospital encounter of 12/28/20   CT Abdomen Pelvis w Contrast    Narrative EXAMINATION: CT ABDOMEN PELVIS W CONTRAST, 12/28/2020 9:01 AM    TECHNIQUE:  Helical CT images from the lung bases through the  symphysis pubis were obtained  with IV contrast. Contrast dose: ISOVUE  300  100ML    COMPARISON: none    HISTORY: Abd pain, gastroenteritis or colitis suspected; Abdominal  pain, left upper quadrant    FINDINGS:    There is dependent atelectasis at the lung bases.    The liver is free of masses or biliary ductal enlargement. No  calcified gallstones are seen.    The the spleen and pancreas appear normal.    The adrenal glands are normal.    The right and left kidneys are free of masses or hydronephrosis.    The periaortic lymph nodes are normal in caliber.    No intraperitoneal masses or inflammatory changes are noted.    In the pelvis the bladder and rectum appear normal.    The regional skeleton is intact      Impression IMPRESSION: No intraperitoneal masses or inflammatory changes are  noted     HANNA LOPEZ MD     ROS:  Pertinent items are noted in HPI.  All other systems are negative.    PHYSICAL EXAM:     Vital signs: /68   Pulse 96   Temp 97.6  F (36.4  C) (Tympanic)   Resp 16   Ht 1.575 m (5' 2\")   Wt 65.8 kg (145 lb)   SpO2 99%   BMI 26.52 kg/m     Weight: [unfilled]   BMI: Body mass index is 26.52 kg/m .   General: Normal, healthy, cooperative, in no acute distress, alert   Skin: no jaundice   HEENT: PERRLA and EOMI   Neck: supple   Lungs: clear to auscultation   CV: Regular rate and rhythm without murmer   Abdominal: abdomen is soft without significant tenderness, masses, organomegaly or guarding   Extremities: No cyanosis, clubbing or edema noted bilaterally in Upper and Lower Extremities   Neurological: without deficit    ASSESSMENT:  25 year old female with left upper quadrant pain.  I " did personally review her CT.  Of note she has a very high splenic flexure.    PLAN: At this point I feel that her symptoms are more consistent with irritable bowel syndrome.  To her high splenic flexure not sure.  We did have a discussion with that and with irritable bowel syndrome.  Recommended having her try fiber supplementation and see if that helps.  Also will write her for Prilosec 40 mg a day and see if between the 2 of those that she gets symptomatic relief.  She continues to have pain she will follow-up and at that point we will be more aggressive with the work-up.  She will follow-up with me otherwise as needed

## 2021-01-04 ENCOUNTER — MEDICAL CORRESPONDENCE (OUTPATIENT)
Dept: HEALTH INFORMATION MANAGEMENT | Facility: CLINIC | Age: 26
End: 2021-01-04

## 2021-04-01 ENCOUNTER — TELEPHONE (OUTPATIENT)
Dept: OBGYN | Facility: OTHER | Age: 26
End: 2021-04-01

## 2021-04-01 DIAGNOSIS — Z32.01 PREGNANCY TEST POSITIVE: Primary | ICD-10-CM

## 2021-04-01 NOTE — TELEPHONE ENCOUNTER
Jordan Reyes Patient:     Positive pregnancy test : yes      P:0  LMP :  GA: 5w6d  Prenatal vitamins?: yes   Bleeding?: no  Cramping?: no  1-sided pelvic pain?: no  Advised patient to be seen ASAP if any of the above symptoms.    Order pended for dating US.     Miguelina appt scheduled with Jordan on- Will schedule after US

## 2021-04-12 ENCOUNTER — HOSPITAL ENCOUNTER (OUTPATIENT)
Dept: ULTRASOUND IMAGING | Facility: HOSPITAL | Age: 26
Discharge: HOME | End: 2021-04-12
Attending: ADVANCED PRACTICE MIDWIFE | Admitting: ADVANCED PRACTICE MIDWIFE
Payer: COMMERCIAL

## 2021-04-12 DIAGNOSIS — Z32.01 PREGNANCY TEST POSITIVE: ICD-10-CM

## 2021-04-12 PROCEDURE — 76801 OB US < 14 WKS SINGLE FETUS: CPT

## 2021-04-22 ENCOUNTER — PRENATAL OFFICE VISIT (OUTPATIENT)
Dept: OBGYN | Facility: OTHER | Age: 26
End: 2021-04-22
Attending: ADVANCED PRACTICE MIDWIFE
Payer: COMMERCIAL

## 2021-04-22 VITALS
WEIGHT: 139 LBS | SYSTOLIC BLOOD PRESSURE: 120 MMHG | DIASTOLIC BLOOD PRESSURE: 68 MMHG | HEIGHT: 62 IN | BODY MASS INDEX: 25.58 KG/M2

## 2021-04-22 DIAGNOSIS — Z34.01 SUPERVISION OF NORMAL FIRST PREGNANCY IN FIRST TRIMESTER: Primary | ICD-10-CM

## 2021-04-22 DIAGNOSIS — Z11.3 SCREEN FOR STD (SEXUALLY TRANSMITTED DISEASE): ICD-10-CM

## 2021-04-22 LAB
SPECIMEN SOURCE: NORMAL
WET PREP SPEC: NORMAL

## 2021-04-22 PROCEDURE — G0463 HOSPITAL OUTPT CLINIC VISIT: HCPCS

## 2021-04-22 PROCEDURE — G0463 HOSPITAL OUTPT CLINIC VISIT: HCPCS | Mod: 25

## 2021-04-22 PROCEDURE — 87491 CHLMYD TRACH DNA AMP PROBE: CPT | Mod: ZL | Performed by: ADVANCED PRACTICE MIDWIFE

## 2021-04-22 PROCEDURE — 87210 SMEAR WET MOUNT SALINE/INK: CPT | Mod: ZL | Performed by: ADVANCED PRACTICE MIDWIFE

## 2021-04-22 PROCEDURE — 76817 TRANSVAGINAL US OBSTETRIC: CPT | Performed by: ADVANCED PRACTICE MIDWIFE

## 2021-04-22 PROCEDURE — 87591 N.GONORRHOEAE DNA AMP PROB: CPT | Mod: ZL | Performed by: ADVANCED PRACTICE MIDWIFE

## 2021-04-22 PROCEDURE — 99207 PR PRENATAL VISIT: CPT | Performed by: ADVANCED PRACTICE MIDWIFE

## 2021-04-22 ASSESSMENT — PAIN SCALES - GENERAL: PAINLEVEL: NO PAIN (0)

## 2021-04-22 ASSESSMENT — MIFFLIN-ST. JEOR: SCORE: 1333.75

## 2021-04-22 NOTE — NURSING NOTE
"Chief Complaint   Patient presents with     Prenatal Care     8w6d       Initial /68 (BP Location: Left arm, Patient Position: Chair, Cuff Size: Adult Regular)   Ht 1.575 m (5' 2\")   Wt 63 kg (139 lb)   LMP 02/19/2021   BMI 25.42 kg/m   Estimated body mass index is 25.42 kg/m  as calculated from the following:    Height as of this encounter: 1.575 m (5' 2\").    Weight as of this encounter: 63 kg (139 lb).  Medication Reconciliation: complete  Xiomy Marte LPN    "

## 2021-04-22 NOTE — PROGRESS NOTES
"Alice Kelly is a 25 year old  presenting after a positive pregnancy test.  /68 (BP Location: Left arm, Patient Position: Chair, Cuff Size: Adult Regular)   Ht 1.575 m (5' 2\")   Wt 63 kg (139 lb)   LMP 2021   BMI 25.42 kg/m      Lmp: 21   Regular: y Oral Contraceptive Agent's: n  Bleeding: n  Pain: n  Smoker: n  Cats: n  Folate: y  Other meds: n    Major Medical Illness: n    Transvaginal Ultrasound by SARAHI Ulloa CNM  FHT: +  Gestational Age: 8 w 6 d  Scan = dates  Done by SARAHI Ulloa CNM      Assessment:   G 1 @ 8 w 6 d  Need of cervical STI screening in pregnancy      Plan:   Wet prep, GC/CT    Total visit greater than 25 minutes with 20 minutes spent face to face with patient discussing first trimester of pregnancy education including medications, alcohol, foods, activities, and travel; prenatal care, questions answered.        SARAHI Ulloa CNM    "

## 2021-04-23 LAB
C TRACH DNA SPEC QL NAA+PROBE: NEGATIVE
N GONORRHOEA DNA SPEC QL NAA+PROBE: NEGATIVE
SPECIMEN SOURCE: NORMAL
SPECIMEN SOURCE: NORMAL

## 2021-05-18 ENCOUNTER — TRANSCRIBE ORDERS (OUTPATIENT)
Dept: MATERNAL FETAL MEDICINE | Facility: CLINIC | Age: 26
End: 2021-05-18

## 2021-05-18 ENCOUNTER — PRENATAL OFFICE VISIT (OUTPATIENT)
Dept: OBGYN | Facility: OTHER | Age: 26
End: 2021-05-18
Attending: ADVANCED PRACTICE MIDWIFE
Payer: COMMERCIAL

## 2021-05-18 ENCOUNTER — MEDICAL CORRESPONDENCE (OUTPATIENT)
Dept: HEALTH INFORMATION MANAGEMENT | Facility: CLINIC | Age: 26
End: 2021-05-18

## 2021-05-18 VITALS
DIASTOLIC BLOOD PRESSURE: 65 MMHG | WEIGHT: 137 LBS | SYSTOLIC BLOOD PRESSURE: 114 MMHG | HEIGHT: 62 IN | BODY MASS INDEX: 25.21 KG/M2

## 2021-05-18 DIAGNOSIS — Z34.00 SUPERVISION OF NORMAL FIRST PREGNANCY, ANTEPARTUM: ICD-10-CM

## 2021-05-18 DIAGNOSIS — Z34.02 SUPERVISION OF NORMAL FIRST PREGNANCY IN SECOND TRIMESTER: Primary | ICD-10-CM

## 2021-05-18 DIAGNOSIS — O26.90 PREGNANCY RELATED CONDITION, ANTEPARTUM: Primary | ICD-10-CM

## 2021-05-18 DIAGNOSIS — R82.5 POSITIVE URINE DRUG SCREEN: ICD-10-CM

## 2021-05-18 LAB
ALBUMIN UR-MCNC: NEGATIVE MG/DL
AMPHETAMINES UR QL: NOT DETECTED NG/ML
APPEARANCE UR: CLEAR
BARBITURATES UR QL SCN: NOT DETECTED NG/ML
BENZODIAZ UR QL SCN: NOT DETECTED NG/ML
BILIRUB UR QL STRIP: NEGATIVE
BUPRENORPHINE UR QL: NOT DETECTED NG/ML
CANNABINOIDS UR QL: NOT DETECTED NG/ML
COCAINE UR QL SCN: NOT DETECTED NG/ML
COLOR UR AUTO: YELLOW
D-METHAMPHET UR QL: NOT DETECTED NG/ML
ERYTHROCYTE [DISTWIDTH] IN BLOOD BY AUTOMATED COUNT: 12.9 % (ref 10–15)
GLUCOSE UR STRIP-MCNC: NEGATIVE MG/DL
HCT VFR BLD AUTO: 34.2 % (ref 35–47)
HGB BLD-MCNC: 12.6 G/DL (ref 11.7–15.7)
HGB UR QL STRIP: NEGATIVE
KETONES UR STRIP-MCNC: NEGATIVE MG/DL
LEUKOCYTE ESTERASE UR QL STRIP: NEGATIVE
MCH RBC QN AUTO: 31.4 PG (ref 26.5–33)
MCHC RBC AUTO-ENTMCNC: 36.8 G/DL (ref 31.5–36.5)
MCV RBC AUTO: 85 FL (ref 78–100)
METHADONE UR QL SCN: NOT DETECTED NG/ML
NITRATE UR QL: NEGATIVE
OPIATES UR QL SCN: ABNORMAL NG/ML
OXYCODONE UR QL SCN: NOT DETECTED NG/ML
PCP UR QL SCN: NOT DETECTED NG/ML
PH UR STRIP: 6.5 PH (ref 5–7)
PLATELET # BLD AUTO: 274 10E9/L (ref 150–450)
PROPOXYPH UR QL: NOT DETECTED NG/ML
RBC # BLD AUTO: 4.01 10E12/L (ref 3.8–5.2)
SOURCE: NORMAL
SP GR UR STRIP: 1.01 (ref 1–1.03)
TRICYCLICS UR QL SCN: NOT DETECTED NG/ML
UROBILINOGEN UR STRIP-ACNC: 0.2 EU/DL (ref 0.2–1)
WBC # BLD AUTO: 8.1 10E9/L (ref 4–11)

## 2021-05-18 PROCEDURE — 86901 BLOOD TYPING SEROLOGIC RH(D): CPT | Mod: ZL | Performed by: ADVANCED PRACTICE MIDWIFE

## 2021-05-18 PROCEDURE — G0463 HOSPITAL OUTPT CLINIC VISIT: HCPCS | Mod: 25

## 2021-05-18 PROCEDURE — 87389 HIV-1 AG W/HIV-1&-2 AB AG IA: CPT | Mod: ZL | Performed by: ADVANCED PRACTICE MIDWIFE

## 2021-05-18 PROCEDURE — 80361 OPIATES 1 OR MORE: CPT | Mod: ZL | Performed by: ADVANCED PRACTICE MIDWIFE

## 2021-05-18 PROCEDURE — 85027 COMPLETE CBC AUTOMATED: CPT | Mod: ZL | Performed by: ADVANCED PRACTICE MIDWIFE

## 2021-05-18 PROCEDURE — 99207 PR FIRST OB VISIT: CPT | Performed by: ADVANCED PRACTICE MIDWIFE

## 2021-05-18 PROCEDURE — 86762 RUBELLA ANTIBODY: CPT | Mod: ZL | Performed by: ADVANCED PRACTICE MIDWIFE

## 2021-05-18 PROCEDURE — 86481 TB AG RESPONSE T-CELL SUSP: CPT | Mod: ZL | Performed by: ADVANCED PRACTICE MIDWIFE

## 2021-05-18 PROCEDURE — 86900 BLOOD TYPING SEROLOGIC ABO: CPT | Mod: ZL | Performed by: ADVANCED PRACTICE MIDWIFE

## 2021-05-18 PROCEDURE — 87340 HEPATITIS B SURFACE AG IA: CPT | Mod: ZL | Performed by: ADVANCED PRACTICE MIDWIFE

## 2021-05-18 PROCEDURE — 36415 COLL VENOUS BLD VENIPUNCTURE: CPT | Mod: ZL | Performed by: ADVANCED PRACTICE MIDWIFE

## 2021-05-18 PROCEDURE — 86780 TREPONEMA PALLIDUM: CPT | Mod: ZL | Performed by: ADVANCED PRACTICE MIDWIFE

## 2021-05-18 PROCEDURE — 80306 DRUG TEST PRSMV INSTRMNT: CPT | Mod: ZL | Performed by: ADVANCED PRACTICE MIDWIFE

## 2021-05-18 PROCEDURE — G0463 HOSPITAL OUTPT CLINIC VISIT: HCPCS

## 2021-05-18 PROCEDURE — 86850 RBC ANTIBODY SCREEN: CPT | Mod: ZL | Performed by: ADVANCED PRACTICE MIDWIFE

## 2021-05-18 PROCEDURE — 81003 URINALYSIS AUTO W/O SCOPE: CPT | Mod: ZL,59 | Performed by: ADVANCED PRACTICE MIDWIFE

## 2021-05-18 ASSESSMENT — PAIN SCALES - GENERAL: PAINLEVEL: NO PAIN (0)

## 2021-05-18 ASSESSMENT — PATIENT HEALTH QUESTIONNAIRE - PHQ9: SUM OF ALL RESPONSES TO PHQ QUESTIONS 1-9: 1

## 2021-05-18 ASSESSMENT — ANXIETY QUESTIONNAIRES
6. BECOMING EASILY ANNOYED OR IRRITABLE: NOT AT ALL
GAD7 TOTAL SCORE: 0
4. TROUBLE RELAXING: NOT AT ALL
5. BEING SO RESTLESS THAT IT IS HARD TO SIT STILL: NOT AT ALL
7. FEELING AFRAID AS IF SOMETHING AWFUL MIGHT HAPPEN: NOT AT ALL
2. NOT BEING ABLE TO STOP OR CONTROL WORRYING: NOT AT ALL
1. FEELING NERVOUS, ANXIOUS, OR ON EDGE: NOT AT ALL
3. WORRYING TOO MUCH ABOUT DIFFERENT THINGS: NOT AT ALL

## 2021-05-18 ASSESSMENT — MIFFLIN-ST. JEOR: SCORE: 1324.68

## 2021-05-18 NOTE — PATIENT INSTRUCTIONS
Return to office in 4 week(s) for prenatal care and as needed.    If you think your bag of water is broke; have bleeding like a period; think your in labor; or are worried about your baby's movement; please call the labor and delivery unit @ [phone].    Thank you for allowing Jordan MCCLAIN CNM and our OB team to participate in your care.  If you have a scheduling or an appointment question please contact [facility] Unit Coordinator at their direct line [phone].   ALL nursing questions or concerns can be directed to your OB nurse at: [phone] Kirill Stauffer/Donna

## 2021-05-18 NOTE — NURSING NOTE
"Chief Complaint   Patient presents with     Prenatal Care     12w4d       Initial /65 (BP Location: Left arm, Patient Position: Chair, Cuff Size: Adult Regular)   Ht 1.575 m (5' 2\")   Wt 62.1 kg (137 lb)   LMP 02/19/2021   BMI 25.06 kg/m   Estimated body mass index is 25.06 kg/m  as calculated from the following:    Height as of this encounter: 1.575 m (5' 2\").    Weight as of this encounter: 62.1 kg (137 lb).  Medication Reconciliation: complete  Xiomy Marte LPN    "

## 2021-05-18 NOTE — PROGRESS NOTES
NEW OB VISIT  Alice Kelly is a 25 year old  at 12w4d presenting for a new ob visit.      Currently taking Prenatal Vitamins? y  Folate y    ZIKA n    MEDICAL HISTORY:  Diabetes: No  Hypertension: No  Heart Disease: No  Autoimmune disorder: No  Kidney Disease/UTI: No  Neurologic Disease/Epilepsy:No  Psychiatric Disease: No  Depression/Postpartum Depression:No  Varicositites/Phlebitis: No  Hepatitis/Liver Disease: No  Thyroid Dysfunction: No  Trauma/Violence: No  History of Blood Transfusion: No  Tobacco Use: No  Alcohol Use: No  Illicit/Recreational Drugs: No  D (Rh Sensitized): unknown  Pulmonary Disease (TB/Asthma): yes, exercised induced asthma  Drug/Latex Allergies/Reactions: Yes, sulfa drugs  Breast: No  GYN Surgery:No  Operations/Hospitalizations:Yes, wisdom teeth extraction  Anesthetic Complications: No  History of Abnormal Pap:No  Uterine Anomalies/SHAUNA: No  Infertility: No  Artifical Reproductive Technologies Treatment: No  Relevant Family History:No  Other/Comments: No    INFECTION HISTORY:  Are you exposed to TB anywhere you work or live?: n  Do you or your Partner have Genital Herpes: n  Rash or viral illness or fever since LMP: n  Hepatitis B or C: n  History of STI (Gonorrhea, Chlamydia, HPV, HIV, Syphilis): n  Other: n  Cats n    BABY DOC A. Satish             Breast feeding: y  Card given y      IMMUNIZATION HISTORY:  Chicken Pox: y  Flu Vaccine:  y  Pneumococcal if smoker or Reactive Airway Disease:  n  Tdap: 28 w  HPV vaccinations (Gardasil): y  Other/comments: n    FAMILY HISTORY  Diabetes: maternal aunt  Hypertension: n  CVA/Stroke: n  Lupus: n  Cancers: Breast  n ovarian n,colon n,uterine: n           Genetics Screening/Teratology Counseling:  Includes Patient, Baby's Father, or anyone in either family with:  Patient's age 35 years or older as of estimated date of delivery:  n  Thalassemia: MCV less than 80: n  Neural Tube defects: n  Congenital Heart Defects: n  Down syndrome: n  Gumaro-Sachs:  "n  Canavan Disease: n  Familial Dysautonomia: n  Sickle Cell Disease or Trait: n  Hemophilia or other blood disorders: n  Muscular Dystrophy: n  Cystic Fibrosis: n  Pedro Luis's Chorea: n  Intellectual development disorder or Autism: n  Other genetic or chromosomal disorders: n  Maternal Metabolic Disorder (Type 1 DM, PKU): n  Patient or baby's father with birth defects not listed above: FOB has 2 bicuspid heart valves no tricuspid (FOB's  father has hypertrophic cardio myopathy)  Recurrent pregnancy loss or stillbirth: n  Medications (Supplements, drugs)/ Illicit/ Recreational drugs/ Alcohol since LMP: n  Other/Comments: n    Review Of Systems:   CONSTITUTIONAL:     NEGATIVE for fever, chills, change in weight  INTEGUMENTARY/SKIN:       NEGATIVE for worrisome rashes, moles or lesions  EYES:     NEGATIVE for vision changes or irritation  ENT/MOUTH: NEGATIVE for ear, mouth and throat problems  RESP:     NEGATIVE for significant cough or SOB  CV:   NEGATIVE for chest pain, palpitations or peripheral edema  GI:     NEGATIVE for unusual nausea, abdominal pain.  Heartburn and constipation.  :   NEGATIVE for frequency, dysuria, hematuria, vaginal discharge or bleeding  MUSCULOSKELETAL:     NEGATIVE for significant arthralgias or myalgia  NEURO:      NEGATIVE for weakness, dizziness or paresthesias  ENDOCRINE:      NEGATIVE for temperature intolerance, skin/hair changes  PSYCHIATRIC:      NEGATIVE for changes in mood or affect.     PHYSICAL EXAM:   /65 (BP Location: Left arm, Patient Position: Chair, Cuff Size: Adult Regular)   Ht 1.575 m (5' 2\")   Wt 62.1 kg (137 lb)   LMP 02/19/2021   BMI 25.06 kg/m     BMI: Body mass index is 25.06 kg/m .  Constitutional: healthy, alert and no distress  Head: Normocephalic. No masses, lesions, tenderness or abnormalities  Neck: Neck supple. Trachea midline. No adenopathy. Thyroid symmetric, normal size.   Cardiovascular: RRR.   Respiratory: lungs clear   Breast: Breasts " reveal mild symmetric fibrocystic densities, but there are no dominant, discrete, fixed or suspicious masses found.  Gastrointestinal: Abdomen soft, non-tender, non-distended. No masses, organomegaly.  Pelvic:  Deferred.  Completed at last visit.  Rectal Exam: deferred  Musculoskeletal: extremities normal  Skin: no suspicious lesions or rashes  Psychiatric: Affect appropriate, cooperative,mentation appears normal.     Risk assessment done. Level is  moderate    ASSESSMENT:   G 1 @ 12 w 4 d  FOB cardiac birth defect  Heartburn  Constipation  NP in clinicals needs TB test    PLAN:  Prenatal labs   Quantiferon   11-13 weeks 1st trimester Nuchal Translucency/Bloodwork  15-16 wk MSAFP   Level II Ultrasound at 20 weeks (ECHO)  Safe med list given for heartburn and constipation  Tdap at 27 weeks  Estimated Fetal Weight at 38 weeks prn       Return to Office:  4 weeks for prenatal care and as needed    45 minutes spent on the date of the encounter doing chart review, history and exam, documentation and further activities per the note     Jordan MCCLAIN, ALICE

## 2021-05-19 LAB
ABO + RH BLD: NORMAL
ABO + RH BLD: NORMAL
BLD GP AB SCN SERPL QL: NORMAL
BLOOD BANK CMNT PATIENT-IMP: NORMAL
BLOOD BANK CMNT PATIENT-IMP: NORMAL
HBV SURFACE AG SERPL QL IA: NONREACTIVE
HIV 1+2 AB+HIV1 P24 AG SERPL QL IA: NONREACTIVE
RUBV IGG SERPL IA-ACNC: 15 IU/ML
SPECIMEN EXP DATE BLD: NORMAL
T PALLIDUM AB SER QL: NONREACTIVE

## 2021-05-19 ASSESSMENT — ANXIETY QUESTIONNAIRES: GAD7 TOTAL SCORE: 0

## 2021-05-19 NOTE — ADDENDUM NOTE
Addended by: SOHA RAMEY on: 5/19/2021 07:53 AM     Modules accepted: Orders     Benefits, risks, and possible complications of procedure explained to patient/caregiver who verbalized understanding and gave written consent. Benefits, risks, and possible complications of procedure explained to patient/caregiver who verbalized understanding and gave written consent.

## 2021-05-20 LAB
GAMMA INTERFERON BACKGROUND BLD IA-ACNC: 0 IU/ML
M TB IFN-G CD4+ BCKGRND COR BLD-ACNC: 10 IU/ML
M TB TUBERC IFN-G BLD QL: NEGATIVE
MITOGEN IGNF BCKGRD COR BLD-ACNC: 0.04 IU/ML
MITOGEN IGNF BCKGRD COR BLD-ACNC: 0.05 IU/ML

## 2021-05-26 ENCOUNTER — HOSPITAL ENCOUNTER (OUTPATIENT)
Dept: ULTRASOUND IMAGING | Facility: HOSPITAL | Age: 26
End: 2021-05-26
Attending: ADVANCED PRACTICE MIDWIFE
Payer: COMMERCIAL

## 2021-05-26 DIAGNOSIS — Z34.02 SUPERVISION OF NORMAL FIRST PREGNANCY IN SECOND TRIMESTER: ICD-10-CM

## 2021-05-26 PROCEDURE — 36415 COLL VENOUS BLD VENIPUNCTURE: CPT | Performed by: ADVANCED PRACTICE MIDWIFE

## 2021-05-26 PROCEDURE — 82105 ALPHA-FETOPROTEIN SERUM: CPT | Performed by: ADVANCED PRACTICE MIDWIFE

## 2021-05-26 PROCEDURE — 84704 HCG FREE BETACHAIN TEST: CPT | Performed by: ADVANCED PRACTICE MIDWIFE

## 2021-05-26 PROCEDURE — 84163 PAPPA SERUM: CPT | Performed by: ADVANCED PRACTICE MIDWIFE

## 2021-05-26 PROCEDURE — 76801 OB US < 14 WKS SINGLE FETUS: CPT

## 2021-06-01 LAB
6MAM SERPL-MCNC: NEGATIVE NG/ML
CODEINE UR CFM-MCNC: 170 NG/ML
LAB SCANNED RESULT: NORMAL
MORPHINE UR CFM-MCNC: NEGATIVE NG/ML

## 2021-06-17 ENCOUNTER — PRENATAL OFFICE VISIT (OUTPATIENT)
Dept: OBGYN | Facility: OTHER | Age: 26
End: 2021-06-17
Attending: ADVANCED PRACTICE MIDWIFE
Payer: COMMERCIAL

## 2021-06-17 VITALS
WEIGHT: 142 LBS | HEIGHT: 62 IN | BODY MASS INDEX: 26.13 KG/M2 | SYSTOLIC BLOOD PRESSURE: 115 MMHG | DIASTOLIC BLOOD PRESSURE: 64 MMHG

## 2021-06-17 DIAGNOSIS — Z34.02 SUPERVISION OF NORMAL FIRST PREGNANCY IN SECOND TRIMESTER: Primary | ICD-10-CM

## 2021-06-17 PROCEDURE — G0463 HOSPITAL OUTPT CLINIC VISIT: HCPCS

## 2021-06-17 PROCEDURE — 99207 PR PRENATAL VISIT: CPT | Performed by: ADVANCED PRACTICE MIDWIFE

## 2021-06-17 ASSESSMENT — MIFFLIN-ST. JEOR: SCORE: 1347.36

## 2021-06-17 ASSESSMENT — PAIN SCALES - GENERAL: PAINLEVEL: NO PAIN (0)

## 2021-06-17 NOTE — PATIENT INSTRUCTIONS
Return to office in 4 week(s) for prenatal care and as needed.    If you think your bag of water is broke; have bleeding like a period; think your in labor; or are worried about your baby's movement; please call the labor and delivery unit @ [phone].    Thank you for allowing Jordan MCCLAIN CNM and our OB team to participate in your care.  If you have a scheduling or an appointment question please contact [facility] Unit Coordinator at their direct line [phone].   ALL nursing questions or concerns can be directed to your OB nurse at: [phone] Kirill Stauffer/Donna

## 2021-06-17 NOTE — NURSING NOTE
"Chief Complaint   Patient presents with     Prenatal Care     16w6d       Initial /64 (BP Location: Left arm, Patient Position: Chair, Cuff Size: Adult Regular)   Ht 1.575 m (5' 2\")   Wt 64.4 kg (142 lb)   LMP 02/19/2021   BMI 25.97 kg/m   Estimated body mass index is 25.97 kg/m  as calculated from the following:    Height as of this encounter: 1.575 m (5' 2\").    Weight as of this encounter: 64.4 kg (142 lb).  Medication Reconciliation: complete  Xiomy Marte LPN    "

## 2021-06-17 NOTE — PROGRESS NOTES
Doing well.  Baby active. Maybe some flutters  Denies contractions, bleeding, or leakage of fluid.     Discussed:  1st and 2nd tri screening, round ligament pain, weight gain, fetal movement    Plan:  US on 7/6/21    Return to office in 4 weeks for prenatal care and as needed.    Jordan Reyes, APRN, CNM

## 2021-06-29 ENCOUNTER — PRE VISIT (OUTPATIENT)
Dept: MATERNAL FETAL MEDICINE | Facility: CLINIC | Age: 26
End: 2021-06-29

## 2021-07-01 DIAGNOSIS — Z34.02 SUPERVISION OF NORMAL FIRST PREGNANCY IN SECOND TRIMESTER: ICD-10-CM

## 2021-07-01 PROCEDURE — 82105 ALPHA-FETOPROTEIN SERUM: CPT | Mod: ZL | Performed by: ADVANCED PRACTICE MIDWIFE

## 2021-07-01 PROCEDURE — 36415 COLL VENOUS BLD VENIPUNCTURE: CPT | Mod: ZL | Performed by: ADVANCED PRACTICE MIDWIFE

## 2021-07-05 LAB
# FETUSES US: NORMAL
# FETUSES: NORMAL
AFP ADJ MOM AMN: 0.98
AFP SERPL-MCNC: 53 NG/ML
AGE - REPORTED: 25.3 YR
CURRENT SMOKER: NO
CURRENT SMOKER: NO
DIABETES STATUS PATIENT: NO
EGG DONOR AGE: NO
FAMILY MEMBER DISEASES HX: NO
FAMILY MEMBER DISEASES HX: NO
GA METHOD: NORMAL
GA METHOD: NORMAL
GA: NORMAL WK
IDDM PATIENT QL: NO
INTEGRATED SCN PATIENT-IMP: NORMAL
IVF PREGNANCY: NO
LMP START DATE: NORMAL
MONOCHORIONIC TWINS: NO
SERVICE CMNT-IMP: NO
SPECIMEN DRAWN SERPL: NORMAL
VALPROIC/CARBAMAZEPINE STATUS: NO
WEIGHT UNITS: NORMAL

## 2021-07-06 ENCOUNTER — HOSPITAL ENCOUNTER (OUTPATIENT)
Dept: ULTRASOUND IMAGING | Facility: CLINIC | Age: 26
End: 2021-07-06
Attending: ADVANCED PRACTICE MIDWIFE
Payer: COMMERCIAL

## 2021-07-06 ENCOUNTER — OFFICE VISIT (OUTPATIENT)
Dept: MATERNAL FETAL MEDICINE | Facility: CLINIC | Age: 26
End: 2021-07-06
Attending: ADVANCED PRACTICE MIDWIFE
Payer: COMMERCIAL

## 2021-07-06 DIAGNOSIS — O26.90 PREGNANCY RELATED CONDITION, ANTEPARTUM: ICD-10-CM

## 2021-07-06 DIAGNOSIS — Z82.79 FAMILY HISTORY OF CONGENITAL HEART DEFECT: Primary | ICD-10-CM

## 2021-07-06 PROCEDURE — 76811 OB US DETAILED SNGL FETUS: CPT

## 2021-07-06 PROCEDURE — 76811 OB US DETAILED SNGL FETUS: CPT | Mod: 26 | Performed by: OBSTETRICS & GYNECOLOGY

## 2021-07-06 NOTE — PROGRESS NOTES
"Please see \"Imaging\" tab under \"Chart Review\" for details of today's US at the [facility].    Tom Wolfe MD  Maternal-Fetal Medicine    "

## 2021-07-13 ENCOUNTER — PRENATAL OFFICE VISIT (OUTPATIENT)
Dept: OBGYN | Facility: OTHER | Age: 26
End: 2021-07-13
Attending: ADVANCED PRACTICE MIDWIFE
Payer: COMMERCIAL

## 2021-07-13 VITALS
DIASTOLIC BLOOD PRESSURE: 63 MMHG | BODY MASS INDEX: 27.05 KG/M2 | HEIGHT: 62 IN | SYSTOLIC BLOOD PRESSURE: 106 MMHG | WEIGHT: 147 LBS

## 2021-07-13 DIAGNOSIS — Z34.00 SUPERVISION OF NORMAL FIRST PREGNANCY, ANTEPARTUM: ICD-10-CM

## 2021-07-13 DIAGNOSIS — Z34.02 SUPERVISION OF NORMAL FIRST PREGNANCY IN SECOND TRIMESTER: Primary | ICD-10-CM

## 2021-07-13 PROCEDURE — 99207 PR PRENATAL VISIT: CPT | Performed by: ADVANCED PRACTICE MIDWIFE

## 2021-07-13 PROCEDURE — G0463 HOSPITAL OUTPT CLINIC VISIT: HCPCS

## 2021-07-13 ASSESSMENT — MIFFLIN-ST. JEOR: SCORE: 1370.04

## 2021-07-13 ASSESSMENT — PAIN SCALES - GENERAL: PAINLEVEL: NO PAIN (0)

## 2021-07-13 NOTE — PROGRESS NOTES
Doing well.  Baby active.   Denies contractions, bleeding, or leakage of fluid.     Discussed:  Review US, ECHO, fetal movement    Plan:  ECHO on 8/3/21    Return to office in 4 weeks for prenatal care and as needed.    SARAHI Ulloa, CNM

## 2021-07-13 NOTE — PATIENT INSTRUCTIONS
Return to office in 4 week(s) for prenatal care and as needed.    If you think your bag of water is broke; have bleeding like a period; think your in labor; or are worried about your baby's movement; please call the labor and delivery unit @ [phone].    Thank you for allowing Jordan MCCLAIN CNM and our OB team to participate in your care.  If you have a scheduling or an appointment question please contact [facility] Unit Coordinator at their direct line [phone].   ALL nursing questions or concerns can be directed to your OB nurse at: [phone] Kirill Stauffer/Donna

## 2021-07-13 NOTE — NURSING NOTE
"Chief Complaint   Patient presents with     Prenatal Care     20w4d       Initial /63 (BP Location: Left arm, Patient Position: Chair, Cuff Size: Adult Regular)   Ht 1.575 m (5' 2\")   Wt 66.7 kg (147 lb)   LMP 02/19/2021   BMI 26.89 kg/m   Estimated body mass index is 26.89 kg/m  as calculated from the following:    Height as of this encounter: 1.575 m (5' 2\").    Weight as of this encounter: 66.7 kg (147 lb).  Medication Reconciliation: complete  Xiomy Marte LPN    "

## 2021-08-02 ENCOUNTER — PRENATAL OFFICE VISIT (OUTPATIENT)
Dept: OBGYN | Facility: OTHER | Age: 26
End: 2021-08-02
Attending: ADVANCED PRACTICE MIDWIFE
Payer: COMMERCIAL

## 2021-08-02 VITALS
DIASTOLIC BLOOD PRESSURE: 60 MMHG | SYSTOLIC BLOOD PRESSURE: 105 MMHG | BODY MASS INDEX: 27.23 KG/M2 | HEIGHT: 62 IN | WEIGHT: 148 LBS

## 2021-08-02 DIAGNOSIS — Z34.02 SUPERVISION OF NORMAL FIRST PREGNANCY IN SECOND TRIMESTER: Primary | ICD-10-CM

## 2021-08-02 DIAGNOSIS — Z34.00 SUPERVISION OF NORMAL FIRST PREGNANCY, ANTEPARTUM: ICD-10-CM

## 2021-08-02 PROCEDURE — G0463 HOSPITAL OUTPT CLINIC VISIT: HCPCS

## 2021-08-02 PROCEDURE — 99207 PR PRENATAL VISIT: CPT | Performed by: ADVANCED PRACTICE MIDWIFE

## 2021-08-02 ASSESSMENT — MIFFLIN-ST. JEOR: SCORE: 1374.57

## 2021-08-02 ASSESSMENT — PAIN SCALES - GENERAL: PAINLEVEL: NO PAIN (0)

## 2021-08-02 NOTE — NURSING NOTE
"Chief Complaint   Patient presents with     Prenatal Care     23w3d       Initial /60 (BP Location: Left arm, Patient Position: Chair, Cuff Size: Adult Regular)   Ht 1.575 m (5' 2\")   Wt 67.1 kg (148 lb)   LMP 02/19/2021   BMI 27.07 kg/m   Estimated body mass index is 27.07 kg/m  as calculated from the following:    Height as of this encounter: 1.575 m (5' 2\").    Weight as of this encounter: 67.1 kg (148 lb).  Medication Reconciliation: complete  Xiomy Marte LPN      "

## 2021-08-02 NOTE — PROGRESS NOTES
Doing well.  Baby active.   Denies contractions, bleeding, or leakage of fluid.     Discussed:  Fetal ECHO, 3rd tri labs, 1 hr GTT, and Tdap, fetal movement    Plan:  Next visit:   3rd tri labs   1 hr GTT   Tdap      Return to office in 4 weeks for prenatal care and as needed.    SARAHI Ulloa, CNM

## 2021-08-02 NOTE — PATIENT INSTRUCTIONS
Return to office in 4 week(s) for prenatal care and as needed.    If you think your bag of water is broke; have bleeding like a period; think your in labor; or are worried about your baby's movement; please call the labor and delivery unit @ [phone].    Thank you for allowing Jordan MCCLAIN CNM and our OB team to participate in your care.  If you have a scheduling or an appointment question please contact [facility] Unit Coordinator at their direct line [phone].   ALL nursing questions or concerns can be directed to your OB nurse at: [phone] Kirill Stauffer/Donna

## 2021-08-03 ENCOUNTER — HOSPITAL ENCOUNTER (OUTPATIENT)
Dept: CARDIOLOGY | Facility: CLINIC | Age: 26
Discharge: HOME | End: 2021-08-03
Attending: OBSTETRICS & GYNECOLOGY | Admitting: OBSTETRICS & GYNECOLOGY
Payer: COMMERCIAL

## 2021-08-03 DIAGNOSIS — Z82.79 FAMILY HISTORY OF CONGENITAL HEART DEFECT: ICD-10-CM

## 2021-08-03 PROCEDURE — 76827 ECHO EXAM OF FETAL HEART: CPT | Mod: 26 | Performed by: PEDIATRICS

## 2021-08-03 PROCEDURE — 76825 ECHO EXAM OF FETAL HEART: CPT | Mod: 26 | Performed by: PEDIATRICS

## 2021-08-03 PROCEDURE — 93325 DOPPLER ECHO COLOR FLOW MAPG: CPT | Mod: 26 | Performed by: PEDIATRICS

## 2021-08-03 PROCEDURE — 93325 DOPPLER ECHO COLOR FLOW MAPG: CPT

## 2021-08-30 ENCOUNTER — PRENATAL OFFICE VISIT (OUTPATIENT)
Dept: OBGYN | Facility: OTHER | Age: 26
End: 2021-08-30
Attending: ADVANCED PRACTICE MIDWIFE
Payer: COMMERCIAL

## 2021-08-30 ENCOUNTER — LAB (OUTPATIENT)
Dept: LAB | Facility: OTHER | Age: 26
End: 2021-08-30
Attending: ADVANCED PRACTICE MIDWIFE
Payer: COMMERCIAL

## 2021-08-30 VITALS — DIASTOLIC BLOOD PRESSURE: 66 MMHG | WEIGHT: 156 LBS | SYSTOLIC BLOOD PRESSURE: 100 MMHG | BODY MASS INDEX: 28.53 KG/M2

## 2021-08-30 DIAGNOSIS — Z67.91 RH NEGATIVE, ANTEPARTUM: ICD-10-CM

## 2021-08-30 DIAGNOSIS — Z34.00 SUPERVISION OF NORMAL FIRST PREGNANCY, ANTEPARTUM: ICD-10-CM

## 2021-08-30 DIAGNOSIS — O26.899 RH NEGATIVE, ANTEPARTUM: ICD-10-CM

## 2021-08-30 DIAGNOSIS — O36.0120 MATERNAL CARE FOR ANTI-D (RH) ANTIBODIES IN SECOND TRIMESTER, SINGLE OR UNSPECIFIED FETUS: ICD-10-CM

## 2021-08-30 DIAGNOSIS — Z23 NEED FOR TDAP VACCINATION: ICD-10-CM

## 2021-08-30 DIAGNOSIS — Z34.00 SUPERVISION OF NORMAL FIRST PREGNANCY, ANTEPARTUM: Primary | ICD-10-CM

## 2021-08-30 PROBLEM — Z01.419 WELL WOMAN EXAM WITH ROUTINE GYNECOLOGICAL EXAM: Status: RESOLVED | Noted: 2018-04-02 | Resolved: 2021-08-30

## 2021-08-30 LAB
ALBUMIN UR-MCNC: NEGATIVE MG/DL
ANTIBODY SCREEN: NEGATIVE
APPEARANCE UR: CLEAR
BACTERIA #/AREA URNS HPF: ABNORMAL /HPF
BILIRUB UR QL STRIP: NEGATIVE
COLOR UR AUTO: ABNORMAL
ERYTHROCYTE [DISTWIDTH] IN BLOOD BY AUTOMATED COUNT: 12.8 % (ref 10–15)
GLUCOSE 1H P 50 G GLC PO SERPL-MCNC: 88 MG/DL (ref 70–129)
GLUCOSE UR STRIP-MCNC: NEGATIVE MG/DL
HCT VFR BLD AUTO: 34.4 % (ref 35–47)
HGB BLD-MCNC: 12 G/DL (ref 11.7–15.7)
HGB UR QL STRIP: NEGATIVE
HYALINE CASTS: 1 /LPF
KETONES UR STRIP-MCNC: NEGATIVE MG/DL
LEUKOCYTE ESTERASE UR QL STRIP: NEGATIVE
MCH RBC QN AUTO: 31.8 PG (ref 26.5–33)
MCHC RBC AUTO-ENTMCNC: 34.9 G/DL (ref 31.5–36.5)
MCV RBC AUTO: 91 FL (ref 78–100)
MUCOUS THREADS #/AREA URNS LPF: PRESENT /LPF
NITRATE UR QL: NEGATIVE
PH UR STRIP: 6.5 [PH] (ref 4.7–8)
PLATELET # BLD AUTO: 267 10E3/UL (ref 150–450)
RBC # BLD AUTO: 3.77 10E6/UL (ref 3.8–5.2)
RBC URINE: 0 /HPF
SP GR UR STRIP: 1.01 (ref 1–1.03)
SPECIMEN EXPIRATION DATE: NORMAL
UROBILINOGEN UR STRIP-MCNC: NORMAL MG/DL
WBC # BLD AUTO: 8.9 10E3/UL (ref 4–11)
WBC URINE: <1 /HPF

## 2021-08-30 PROCEDURE — 36415 COLL VENOUS BLD VENIPUNCTURE: CPT | Mod: ZL

## 2021-08-30 PROCEDURE — G0463 HOSPITAL OUTPT CLINIC VISIT: HCPCS | Mod: 25

## 2021-08-30 PROCEDURE — 85027 COMPLETE CBC AUTOMATED: CPT | Mod: ZL

## 2021-08-30 PROCEDURE — 90715 TDAP VACCINE 7 YRS/> IM: CPT

## 2021-08-30 PROCEDURE — 90715 TDAP VACCINE 7 YRS/> IM: CPT | Performed by: OBSTETRICS & GYNECOLOGY

## 2021-08-30 PROCEDURE — 81001 URINALYSIS AUTO W/SCOPE: CPT | Mod: ZL

## 2021-08-30 PROCEDURE — 82952 GTT-ADDED SAMPLES: CPT | Mod: ZL

## 2021-08-30 PROCEDURE — 99207 PR PRENATAL VISIT: CPT | Performed by: OBSTETRICS & GYNECOLOGY

## 2021-08-30 PROCEDURE — 96372 THER/PROPH/DIAG INJ SC/IM: CPT

## 2021-08-30 PROCEDURE — 86780 TREPONEMA PALLIDUM: CPT | Mod: ZL

## 2021-08-30 PROCEDURE — 86850 RBC ANTIBODY SCREEN: CPT | Mod: ZL

## 2021-08-30 PROCEDURE — G0463 HOSPITAL OUTPT CLINIC VISIT: HCPCS

## 2021-08-30 RX ADMIN — HUMAN RHO(D) IMMUNE GLOBULIN 300 MCG: 300 INJECTION, SOLUTION INTRAMUSCULAR at 09:22

## 2021-08-30 ASSESSMENT — PAIN SCALES - GENERAL: PAINLEVEL: NO PAIN (0)

## 2021-08-30 NOTE — PROGRESS NOTES
SUBJECTIVE  Alice Kelly is a 26 year old  with Patient's last menstrual period was 2021.. Estimated Date of Delivery: 2021. Gestational age is 27w3d. She presents for follow up OB visit.    She is doing well. She feels good fetal movement. She denies leaking of fluid, vaginal bleeding, cramping or pelvic pressure. She denies abnormal vaginal discharge, difficulty urinating, fever or chills. No headache, vision changes, lower extremity swelling, upper abdominal pain, chest pain, or shortness of breath.  She denies depression symptoms.    OBJECTIVE  /66   Wt 70.8 kg (156 lb)   LMP 2021   BMI 28.53 kg/m      General:  Well-developed well-nourished gravid female in no apparent distress. Alert and oriented x3.  Abdomen: Soft, gravid, non tender, positive bowel sounds. Fundal height at 28 cm. Fetal heart tones auscultated at 144.  Cervix: deferred  Extremities:  No clubbing, cyanosis, or edema. Nontender bilaterally.    ASSESSMENT / PLAN  1 Intrauterine pregnancy at 27w3d.  2 Rh Negative    - Problem list reviewed and updated.  - Pregnancy weight gain has been 7.761 kg (17 lb 1.8 oz) to date, which is adequate.   - I discussed screening for gestational diabetes and anemia. 1 hour Glucose screen and second trimester hemoglobin ordered.  - I recommend that the patient have an antibody screen and Rhogam as she is Rh negative.  - I discussed the recommendation for Tdap in pregnancy. The patient agrees to vaccination. Adacel is ordered.  - I reviewed routine obstetrical precautions, recommendations and instructions with the patient including fetal movement and kick count instructions.  - I reviewed depression precautions.  - I reviewed pre-eclampsia precautions with the patient with instructions for the patient to come in for persistent headache unrelieved with Tylenol, blurry vision, right upper quadrant pain, shortness of breath, or significant persistent edema.  - I reviewed  labor  precautions with the patient with instructions for the patient to come in for decreased fetal movement, suspected rupture of membranes, regular contraction pattern, vaginal bleeding or any other concerning symptoms.  - Follow up in 2 weeks.    Fabio Gruber MD  Obstetrics and Gynecology

## 2021-08-31 LAB — T PALLIDUM AB SER QL: NONREACTIVE

## 2021-09-13 ENCOUNTER — PRENATAL OFFICE VISIT (OUTPATIENT)
Dept: OBGYN | Facility: OTHER | Age: 26
End: 2021-09-13
Attending: ADVANCED PRACTICE MIDWIFE
Payer: COMMERCIAL

## 2021-09-13 VITALS
BODY MASS INDEX: 29.26 KG/M2 | DIASTOLIC BLOOD PRESSURE: 66 MMHG | WEIGHT: 159 LBS | HEIGHT: 62 IN | SYSTOLIC BLOOD PRESSURE: 111 MMHG

## 2021-09-13 DIAGNOSIS — Z34.03 SUPERVISION OF NORMAL FIRST PREGNANCY IN THIRD TRIMESTER: Primary | ICD-10-CM

## 2021-09-13 DIAGNOSIS — Z34.00 SUPERVISION OF NORMAL FIRST PREGNANCY, ANTEPARTUM: ICD-10-CM

## 2021-09-13 PROCEDURE — G0463 HOSPITAL OUTPT CLINIC VISIT: HCPCS

## 2021-09-13 PROCEDURE — 99207 PR PRENATAL VISIT: CPT | Performed by: ADVANCED PRACTICE MIDWIFE

## 2021-09-13 ASSESSMENT — MIFFLIN-ST. JEOR: SCORE: 1419.47

## 2021-09-13 ASSESSMENT — PAIN SCALES - GENERAL: PAINLEVEL: NO PAIN (0)

## 2021-09-13 NOTE — PROGRESS NOTES
Doing well.  Baby active.   Denies contractions, bleeding, or leakage of fluid.     Discussed:  Reviewed labs, fetal movement  Anticipatory Guidance  care in hospital  Respiratory therapy called for all deliveries  Skin to skin  Immunizations/meds   -Hepatitis B   -Erythromycin   -Vitamin K  Screening   -Hearing   -CCHD   -Bilirubin   -Metabolic  Rooming in  Feeding:  Breast  Car seats  Provider/appointments     Plan:  GBS PCR @ 36 w      Return to office in 2 weeks for prenatal care and as needed.    Jordan Reyes, SARAHI, CNM

## 2021-09-13 NOTE — NURSING NOTE
"Chief Complaint   Patient presents with     Prenatal Care     29w3d       Initial /66 (BP Location: Left arm, Patient Position: Chair, Cuff Size: Adult Regular)   Ht 1.575 m (5' 2\")   Wt 72.1 kg (159 lb)   LMP 02/19/2021   BMI 29.08 kg/m   Estimated body mass index is 29.08 kg/m  as calculated from the following:    Height as of this encounter: 1.575 m (5' 2\").    Weight as of this encounter: 72.1 kg (159 lb).  Medication Reconciliation: complete  Xiomy Marte LPN    "

## 2021-09-13 NOTE — PATIENT INSTRUCTIONS
Return to office in 2 week(s) for prenatal care and as needed.    If you think your bag of water is broke; have bleeding like a period; think your in labor; or are worried about your baby's movement; please call the labor and delivery unit @ [phone].    Thank you for allowing Jordan MCCLAIN CNM and our OB team to participate in your care.  If you have a scheduling or an appointment question please contact [facility] Unit Coordinator at their direct line [phone].   ALL nursing questions or concerns can be directed to your OB nurse at: [phone] Kirill Stauffer/Donna

## 2021-09-27 ENCOUNTER — PRENATAL OFFICE VISIT (OUTPATIENT)
Dept: OBGYN | Facility: OTHER | Age: 26
End: 2021-09-27
Attending: ADVANCED PRACTICE MIDWIFE
Payer: COMMERCIAL

## 2021-09-27 VITALS
WEIGHT: 160 LBS | SYSTOLIC BLOOD PRESSURE: 123 MMHG | HEIGHT: 62 IN | BODY MASS INDEX: 29.44 KG/M2 | DIASTOLIC BLOOD PRESSURE: 64 MMHG

## 2021-09-27 DIAGNOSIS — Z34.83 ENCOUNTER FOR SUPERVISION OF OTHER NORMAL PREGNANCY, THIRD TRIMESTER: Primary | ICD-10-CM

## 2021-09-27 PROCEDURE — 99207 PR PRENATAL VISIT: CPT | Performed by: ADVANCED PRACTICE MIDWIFE

## 2021-09-27 PROCEDURE — G0463 HOSPITAL OUTPT CLINIC VISIT: HCPCS

## 2021-09-27 ASSESSMENT — MIFFLIN-ST. JEOR: SCORE: 1424.01

## 2021-09-27 ASSESSMENT — PAIN SCALES - GENERAL: PAINLEVEL: NO PAIN (0)

## 2021-09-27 NOTE — PATIENT INSTRUCTIONS
Return to office in 2 week(s) for prenatal care and as needed.    If you think your bag of water is broke; have bleeding like a period; think your in labor; or are worried about your baby's movement; please call the labor and delivery unit @ [phone].    Thank you for allowing Jordan MCCLAIN CNM and our OB team to participate in your care.  If you have a scheduling or an appointment question please contact [facility] Unit Coordinator at their direct line [phone].   ALL nursing questions or concerns can be directed to your OB nurse at: [phone] Kirill Stauffer/Donna

## 2021-09-27 NOTE — PROGRESS NOTES
Doing well.  Baby active.   Denies contractions, bleeding, or leakage of fluid.     Discussed:  Pain management, pain with touch by umbilicus for a couple of days, right sided ligament pain x one/resolved, fetal movement    Plan:  GBS PCR at 36 w    Return to office in 2 weeks for prenatal care and as needed.    SARAHI Ulloa, CNM

## 2021-09-27 NOTE — NURSING NOTE
"Chief Complaint   Patient presents with     Prenatal Care     31w3d       Initial /64 (BP Location: Left arm, Patient Position: Chair, Cuff Size: Adult Regular)   Ht 1.575 m (5' 2\")   Wt 72.6 kg (160 lb)   LMP 02/19/2021   BMI 29.26 kg/m   Estimated body mass index is 29.26 kg/m  as calculated from the following:    Height as of this encounter: 1.575 m (5' 2\").    Weight as of this encounter: 72.6 kg (160 lb).  Medication Reconciliation: complete  Xiomy Marte LPN    "

## 2021-10-03 ENCOUNTER — HEALTH MAINTENANCE LETTER (OUTPATIENT)
Age: 26
End: 2021-10-03

## 2021-10-11 ENCOUNTER — PRENATAL OFFICE VISIT (OUTPATIENT)
Dept: OBGYN | Facility: OTHER | Age: 26
End: 2021-10-11
Attending: ADVANCED PRACTICE MIDWIFE
Payer: COMMERCIAL

## 2021-10-11 VITALS
SYSTOLIC BLOOD PRESSURE: 112 MMHG | BODY MASS INDEX: 29.81 KG/M2 | WEIGHT: 162 LBS | HEIGHT: 62 IN | DIASTOLIC BLOOD PRESSURE: 63 MMHG

## 2021-10-11 DIAGNOSIS — Z34.03 PREGNANCY, SUPERVISION OF FIRST, THIRD TRIMESTER: Primary | ICD-10-CM

## 2021-10-11 PROCEDURE — G0463 HOSPITAL OUTPT CLINIC VISIT: HCPCS

## 2021-10-11 PROCEDURE — 99207 PR PRENATAL VISIT: CPT | Performed by: ADVANCED PRACTICE MIDWIFE

## 2021-10-11 ASSESSMENT — MIFFLIN-ST. JEOR: SCORE: 1433.08

## 2021-10-11 ASSESSMENT — PAIN SCALES - GENERAL: PAINLEVEL: NO PAIN (0)

## 2021-10-11 NOTE — PATIENT INSTRUCTIONS
Return to office in 2 week(s) for prenatal care and as needed.    If you think your bag of water is broke; have bleeding like a period; think your in labor; or are worried about your baby's movement; please call the labor and delivery unit @ [phone].    Thank you for allowing Jordan MCCLAIN CNM and our OB team to participate in your care.  If you have a scheduling or an appointment question please contact [facility] Unit Coordinator at their direct line [phone].   ALL nursing questions or concerns can be directed to your OB nurse at: [phone] Kirill Stauffer/Donna

## 2021-10-11 NOTE — PROGRESS NOTES
Doing well.  Baby active.   Denies contractions, bleeding, or leakage of fluid. Reports tightening x one    Discussed:  PTL, preeclampsia, GBS screening, presentation, fetal movement    Plan:  Next visit:   GBS PCR   US for presentation    Return to office in 2 weeks for prenatal care and as needed.    SARAHI Ulloa, CNM

## 2021-10-11 NOTE — NURSING NOTE
"Chief Complaint   Patient presents with     Prenatal Care     33w3d       Initial /63 (BP Location: Left arm, Patient Position: Chair, Cuff Size: Adult Regular)   Ht 1.575 m (5' 2\")   Wt 73.5 kg (162 lb)   LMP 02/19/2021   BMI 29.63 kg/m   Estimated body mass index is 29.63 kg/m  as calculated from the following:    Height as of this encounter: 1.575 m (5' 2\").    Weight as of this encounter: 73.5 kg (162 lb).  Medication Reconciliation: complete  Xiomy Marte LPN    "

## 2021-10-13 ENCOUNTER — TRANSFERRED RECORDS (OUTPATIENT)
Dept: HEALTH INFORMATION MANAGEMENT | Facility: HOSPITAL | Age: 26
End: 2021-10-13

## 2021-10-25 ENCOUNTER — PRENATAL OFFICE VISIT (OUTPATIENT)
Dept: OBGYN | Facility: OTHER | Age: 26
End: 2021-10-25
Attending: ADVANCED PRACTICE MIDWIFE
Payer: COMMERCIAL

## 2021-10-25 VITALS
WEIGHT: 165 LBS | SYSTOLIC BLOOD PRESSURE: 115 MMHG | HEIGHT: 62 IN | DIASTOLIC BLOOD PRESSURE: 62 MMHG | BODY MASS INDEX: 30.36 KG/M2

## 2021-10-25 DIAGNOSIS — Z34.03 SUPERVISION OF NORMAL FIRST PREGNANCY IN THIRD TRIMESTER: Primary | ICD-10-CM

## 2021-10-25 PROCEDURE — G0463 HOSPITAL OUTPT CLINIC VISIT: HCPCS

## 2021-10-25 PROCEDURE — 99207 PR PRENATAL VISIT: CPT | Performed by: ADVANCED PRACTICE MIDWIFE

## 2021-10-25 ASSESSMENT — PAIN SCALES - GENERAL: PAINLEVEL: NO PAIN (0)

## 2021-10-25 ASSESSMENT — MIFFLIN-ST. JEOR: SCORE: 1446.69

## 2021-10-25 NOTE — PROGRESS NOTES
Doing well.  Baby active.   Denies bleeding, or leakage of fluid. Occasional contraction    Discussed:  GBS screening, presentation, Covid booster, breast pump, Vitamin D, fetal movement    Plan:  Next visit:   GBS PCR   US for presentation and fluid check    Return to office in 1 weeks for prenatal care and as needed.    SARAHI Ulloa, CNM

## 2021-10-25 NOTE — NURSING NOTE
"Chief Complaint   Patient presents with     Prenatal Care     35w3d       Initial /62 (BP Location: Left arm, Patient Position: Chair, Cuff Size: Adult Regular)   Ht 1.575 m (5' 2\")   Wt 74.8 kg (165 lb)   LMP 02/19/2021   BMI 30.18 kg/m   Estimated body mass index is 30.18 kg/m  as calculated from the following:    Height as of this encounter: 1.575 m (5' 2\").    Weight as of this encounter: 74.8 kg (165 lb).  Medication Reconciliation: complete  Xiomy Marte LPN    "

## 2021-10-25 NOTE — PATIENT INSTRUCTIONS
Return to office in 1 week(s) for prenatal care and as needed.    If you think your bag of water is broke; have bleeding like a period; think your in labor; or are worried about your baby's movement; please call the labor and delivery unit @ [phone].    Thank you for allowing Jordan MCCLAIN CNM and our OB team to participate in your care.  If you have a scheduling or an appointment question please contact [facility] Unit Coordinator at their direct line [phone].   ALL nursing questions or concerns can be directed to your OB nurse at: [phone] Kirill Stauffer/Donna

## 2021-11-01 ENCOUNTER — PRENATAL OFFICE VISIT (OUTPATIENT)
Dept: OBGYN | Facility: OTHER | Age: 26
End: 2021-11-01
Attending: ADVANCED PRACTICE MIDWIFE
Payer: COMMERCIAL

## 2021-11-01 VITALS
DIASTOLIC BLOOD PRESSURE: 65 MMHG | BODY MASS INDEX: 30.73 KG/M2 | WEIGHT: 167 LBS | SYSTOLIC BLOOD PRESSURE: 116 MMHG | HEIGHT: 62 IN

## 2021-11-01 DIAGNOSIS — Z34.03 SUPERVISION OF NORMAL FIRST PREGNANCY IN THIRD TRIMESTER: Primary | ICD-10-CM

## 2021-11-01 PROCEDURE — G0463 HOSPITAL OUTPT CLINIC VISIT: HCPCS

## 2021-11-01 PROCEDURE — 99207 PR PRENATAL VISIT: CPT | Performed by: ADVANCED PRACTICE MIDWIFE

## 2021-11-01 PROCEDURE — 76815 OB US LIMITED FETUS(S): CPT | Performed by: ADVANCED PRACTICE MIDWIFE

## 2021-11-01 PROCEDURE — 87653 STREP B DNA AMP PROBE: CPT | Mod: ZL | Performed by: ADVANCED PRACTICE MIDWIFE

## 2021-11-01 ASSESSMENT — PAIN SCALES - GENERAL: PAINLEVEL: NO PAIN (0)

## 2021-11-01 ASSESSMENT — MIFFLIN-ST. JEOR: SCORE: 1455.76

## 2021-11-01 NOTE — PATIENT INSTRUCTIONS
Return to office in 1 week(s) for prenatal care and as needed.    If you think your bag of water is broke; have bleeding like a period; think your in labor; or are worried about your baby's movement; please call the labor and delivery unit @ [phone].    Thank you for allowing Jordan MCCLAIN CNM and our OB team to participate in your care.  If you have a scheduling or an appointment question please contact [facility] Unit Coordinator at their direct line [phone].   ALL nursing questions or concerns can be directed to your OB nurse at: [phone] Kirill Stauffer/Donna

## 2021-11-01 NOTE — PROGRESS NOTES
Doing well.  Baby active.   Denies contractions, bleeding, or leakage of fluid. Some tightening with exercise.    Discussed:  PP Anxiety, labor, when to go to hospital, birth plan, fetal movement     US:  Cephalic.  Single vertical pocket greater than 2 cm.    Plan:  Birth plan to [facility]    Return to office in 1 weeks for prenatal care and as needed.    SARAHI Ulloa, CNM

## 2021-11-01 NOTE — NURSING NOTE
"Chief Complaint   Patient presents with     Prenatal Care     36w3d       Initial /65 (BP Location: Left arm, Patient Position: Chair, Cuff Size: Adult Regular)   Ht 1.575 m (5' 2\")   Wt 75.8 kg (167 lb)   LMP 02/19/2021   BMI 30.54 kg/m   Estimated body mass index is 30.54 kg/m  as calculated from the following:    Height as of this encounter: 1.575 m (5' 2\").    Weight as of this encounter: 75.8 kg (167 lb).  Medication Reconciliation: complete  Xiomy Marte LPN    "

## 2021-11-03 LAB
GP B STREP DNA SPEC QL NAA+PROBE: POSITIVE
PATIENT PENICILLIN, AMOXICILLIN, CEPHALOSPORINS ALLERGY: NO

## 2021-11-08 ENCOUNTER — PRENATAL OFFICE VISIT (OUTPATIENT)
Dept: OBGYN | Facility: OTHER | Age: 26
End: 2021-11-08
Attending: ADVANCED PRACTICE MIDWIFE
Payer: COMMERCIAL

## 2021-11-08 VITALS
SYSTOLIC BLOOD PRESSURE: 123 MMHG | DIASTOLIC BLOOD PRESSURE: 74 MMHG | HEIGHT: 62 IN | WEIGHT: 168 LBS | BODY MASS INDEX: 30.91 KG/M2

## 2021-11-08 DIAGNOSIS — Z34.03 SUPERVISION OF NORMAL FIRST PREGNANCY IN THIRD TRIMESTER: Primary | ICD-10-CM

## 2021-11-08 PROCEDURE — G0463 HOSPITAL OUTPT CLINIC VISIT: HCPCS

## 2021-11-08 PROCEDURE — 99207 PR PRENATAL VISIT: CPT | Performed by: ADVANCED PRACTICE MIDWIFE

## 2021-11-08 ASSESSMENT — PAIN SCALES - GENERAL: PAINLEVEL: NO PAIN (0)

## 2021-11-08 ASSESSMENT — MIFFLIN-ST. JEOR: SCORE: 1460.29

## 2021-11-08 NOTE — NURSING NOTE
"Chief Complaint   Patient presents with     Prenatal Care     37w3d       Initial /74 (BP Location: Left arm, Patient Position: Chair, Cuff Size: Adult Regular)   Ht 1.575 m (5' 2\")   Wt 76.2 kg (168 lb)   LMP 02/19/2021   BMI 30.73 kg/m   Estimated body mass index is 30.73 kg/m  as calculated from the following:    Height as of this encounter: 1.575 m (5' 2\").    Weight as of this encounter: 76.2 kg (168 lb).  Medication Reconciliation: complete  Xiomy Matre LPN    "

## 2021-11-08 NOTE — PROGRESS NOTES
Doing well.  Baby active.   Denies contractions, bleeding, or leakage of fluid. Some contractions    Discussed:  Pushing, PP cares, baby care, Covid booster, fetal movement    Plan:  GBS treatment in labor    Return to office in 1 weeks for prenatal care and as needed.    Jordan Reyes, APRN, CNM

## 2021-11-15 ENCOUNTER — PRENATAL OFFICE VISIT (OUTPATIENT)
Dept: OBGYN | Facility: OTHER | Age: 26
End: 2021-11-15
Attending: ADVANCED PRACTICE MIDWIFE
Payer: COMMERCIAL

## 2021-11-15 VITALS
WEIGHT: 170 LBS | HEIGHT: 62 IN | OXYGEN SATURATION: 98 % | HEART RATE: 81 BPM | SYSTOLIC BLOOD PRESSURE: 121 MMHG | BODY MASS INDEX: 31.28 KG/M2 | DIASTOLIC BLOOD PRESSURE: 73 MMHG

## 2021-11-15 DIAGNOSIS — Z34.03 SUPERVISION OF NORMAL FIRST PREGNANCY IN THIRD TRIMESTER: Primary | ICD-10-CM

## 2021-11-15 DIAGNOSIS — Z34.00 SUPERVISION OF NORMAL FIRST PREGNANCY, ANTEPARTUM: ICD-10-CM

## 2021-11-15 PROCEDURE — 99207 PR PRENATAL VISIT: CPT | Performed by: ADVANCED PRACTICE MIDWIFE

## 2021-11-15 PROCEDURE — G0463 HOSPITAL OUTPT CLINIC VISIT: HCPCS

## 2021-11-15 ASSESSMENT — MIFFLIN-ST. JEOR: SCORE: 1469.36

## 2021-11-15 ASSESSMENT — PAIN SCALES - GENERAL: PAINLEVEL: NO PAIN (0)

## 2021-11-15 NOTE — PROGRESS NOTES
Doing well.  Baby active.   Denies contractions, bleeding, or leakage of fluid.     Discussed:  Labor, when to go to hospital, GBS treatment, fetal movement    Plan:  Treat for GBS in labor    Return to office in 1 weeks for prenatal care and as needed.    Jordan Reyes, SARAHI, CNM

## 2021-11-15 NOTE — PATIENT INSTRUCTIONS
Return to office in 1 week(s) for prenatal care and as needed.    If you think your bag of water is broke; have bleeding like a period; think your in labor; or are worried about your baby's movement; please call the labor and delivery unit @ [phone].    Thank you for allowing Jordan MCCLAIN CNM and our OB team to participate in your care.  If you have a scheduling or an appointment question please contact [facility] Unit Coordinator at their direct line [phone].   ALL nursing questions or concerns can be directed to your OB nurse at: [phone] Kirill Stauffer/Donna

## 2021-11-15 NOTE — NURSING NOTE
"Chief Complaint   Patient presents with     Prenatal Care     38w 3d       Initial /73 (BP Location: Left arm, Cuff Size: Adult Regular)   Pulse 81   Ht 1.575 m (5' 2\")   Wt 77.1 kg (170 lb)   LMP 02/19/2021   SpO2 98%   BMI 31.09 kg/m   Estimated body mass index is 31.09 kg/m  as calculated from the following:    Height as of this encounter: 1.575 m (5' 2\").    Weight as of this encounter: 77.1 kg (170 lb).  Medication Reconciliation: complete  Uyen Licona LPN  "

## 2021-11-22 ENCOUNTER — PRENATAL OFFICE VISIT (OUTPATIENT)
Dept: OBGYN | Facility: OTHER | Age: 26
End: 2021-11-22
Attending: ADVANCED PRACTICE MIDWIFE
Payer: COMMERCIAL

## 2021-11-22 VITALS
BODY MASS INDEX: 31.1 KG/M2 | HEART RATE: 78 BPM | OXYGEN SATURATION: 99 % | SYSTOLIC BLOOD PRESSURE: 118 MMHG | DIASTOLIC BLOOD PRESSURE: 71 MMHG | HEIGHT: 62 IN | WEIGHT: 169 LBS

## 2021-11-22 DIAGNOSIS — Z34.03 SUPERVISION OF NORMAL FIRST PREGNANCY IN THIRD TRIMESTER: Primary | ICD-10-CM

## 2021-11-22 PROCEDURE — G0463 HOSPITAL OUTPT CLINIC VISIT: HCPCS

## 2021-11-22 PROCEDURE — 99207 PR PRENATAL VISIT: CPT | Performed by: ADVANCED PRACTICE MIDWIFE

## 2021-11-22 ASSESSMENT — MIFFLIN-ST. JEOR: SCORE: 1464.83

## 2021-11-22 ASSESSMENT — PAIN SCALES - GENERAL: PAINLEVEL: NO PAIN (0)

## 2021-11-22 NOTE — PATIENT INSTRUCTIONS
Return to office in 1 week(s) for prenatal care and as needed.    If you think your bag of water is broke; have bleeding like a period; think your in labor; or are worried about your baby's movement; please call the labor and delivery unit @ [phone].    Thank you for allowing Jordan MCCLAIN CNM and our OB team to participate in your care.  If you have a scheduling or an appointment question please contact [facility] Unit Coordinator at their direct line [phone].   ALL nursing questions or concerns can be directed to your OB nurse at: [phone] Kirill Stauffer/Donna

## 2021-11-22 NOTE — NURSING NOTE
"Chief Complaint   Patient presents with     Prenatal Care     39w 3d       Initial /71 (BP Location: Left arm, Cuff Size: Adult Regular)   Pulse 78   Ht 1.575 m (5' 2\")   Wt 76.7 kg (169 lb)   LMP 02/19/2021   SpO2 99%   BMI 30.91 kg/m   Estimated body mass index is 30.91 kg/m  as calculated from the following:    Height as of this encounter: 1.575 m (5' 2\").    Weight as of this encounter: 76.7 kg (169 lb).  Medication Reconciliation: complete  Uyen Licona LPN  "

## 2021-11-22 NOTE — PROGRESS NOTES
Doing well.  Baby active.   Denies bleeding, or leakage of fluid. Some contractions daily    Discussed:  Labor, when to go to hospital, post dates, elective vs medical contractions, fetal movement    Plan:  Treat for GBS in labor    Return to office in 1 weeks for prenatal care and as needed.    Jordan Reyes, SARAHI, CNM

## 2021-11-27 ENCOUNTER — HEALTH MAINTENANCE LETTER (OUTPATIENT)
Age: 26
End: 2021-11-27

## 2021-11-29 ENCOUNTER — PRENATAL OFFICE VISIT (OUTPATIENT)
Dept: OBGYN | Facility: OTHER | Age: 26
End: 2021-11-29
Attending: ADVANCED PRACTICE MIDWIFE
Payer: COMMERCIAL

## 2021-11-29 VITALS
BODY MASS INDEX: 31.83 KG/M2 | WEIGHT: 173 LBS | OXYGEN SATURATION: 98 % | HEIGHT: 62 IN | SYSTOLIC BLOOD PRESSURE: 120 MMHG | HEART RATE: 76 BPM | DIASTOLIC BLOOD PRESSURE: 70 MMHG

## 2021-11-29 DIAGNOSIS — Z34.90 ENCOUNTER FOR INDUCTION OF LABOR: ICD-10-CM

## 2021-11-29 DIAGNOSIS — Z34.03 SUPERVISION OF NORMAL FIRST PREGNANCY IN THIRD TRIMESTER: Primary | ICD-10-CM

## 2021-11-29 PROCEDURE — 76815 OB US LIMITED FETUS(S): CPT | Performed by: ADVANCED PRACTICE MIDWIFE

## 2021-11-29 PROCEDURE — U0003 INFECTIOUS AGENT DETECTION BY NUCLEIC ACID (DNA OR RNA); SEVERE ACUTE RESPIRATORY SYNDROME CORONAVIRUS 2 (SARS-COV-2) (CORONAVIRUS DISEASE [COVID-19]), AMPLIFIED PROBE TECHNIQUE, MAKING USE OF HIGH THROUGHPUT TECHNOLOGIES AS DESCRIBED BY CMS-2020-01-R: HCPCS | Mod: ZL | Performed by: ADVANCED PRACTICE MIDWIFE

## 2021-11-29 PROCEDURE — 99207 PR PRENATAL VISIT: CPT | Performed by: ADVANCED PRACTICE MIDWIFE

## 2021-11-29 PROCEDURE — G0463 HOSPITAL OUTPT CLINIC VISIT: HCPCS | Mod: 25

## 2021-11-29 ASSESSMENT — PAIN SCALES - GENERAL: PAINLEVEL: NO PAIN (0)

## 2021-11-29 ASSESSMENT — MIFFLIN-ST. JEOR: SCORE: 1482.97

## 2021-11-29 NOTE — PATIENT INSTRUCTIONS
Return to office in 2 week(s) for postpartum care and as needed.    If you think your bag of water is broke; have bleeding like a period; think your in labor; or are worried about your baby's movement; please call the labor and delivery unit @ [phone].    Thank you for allowing Jordan MCCLAIN CNM and our OB team to participate in your care.  If you have a scheduling or an appointment question please contact [facility] at their direct line [phone].   ALL nursing questions or concerns can be directed to your OB nurse at: [phone] Kirill Stauffer/Donna

## 2021-11-29 NOTE — PROGRESS NOTES
Doing well.  Baby active.   Denies contractions, bleeding, or leakage of fluid. Some contractions    Discussed:  Postdates, gilliland score, elective vs medical IOL, antepartum testing, fetal movement    VE:  1-2 cm/ 40% effaced/ -2 station/ medium, middle  US:  Cephalic.  Single vertical pocket greater than 2 cm    Plan:  IOL on 12/2/21 for postdates  Covid test today    Return to office in 2 weeks for postpartum care and as needed.    Jordan Reyes, APRN, CNM

## 2021-11-29 NOTE — NURSING NOTE
"Chief Complaint   Patient presents with     Prenatal Care     40w 3d       Initial /70 (BP Location: Left arm, Cuff Size: Adult Regular)   Pulse 76   Ht 1.575 m (5' 2\")   Wt 78.5 kg (173 lb)   LMP 02/19/2021   SpO2 98%   BMI 31.64 kg/m   Estimated body mass index is 31.64 kg/m  as calculated from the following:    Height as of this encounter: 1.575 m (5' 2\").    Weight as of this encounter: 78.5 kg (173 lb).  Medication Reconciliation: complete  Uyen Licona LPN  "

## 2021-11-30 LAB — SARS-COV-2 RNA RESP QL NAA+PROBE: NEGATIVE

## 2021-12-02 ENCOUNTER — ANESTHESIA EVENT (OUTPATIENT)
Dept: OBGYN | Facility: HOSPITAL | Age: 26
End: 2021-12-02
Payer: COMMERCIAL

## 2021-12-02 ENCOUNTER — ANESTHESIA (OUTPATIENT)
Dept: OBGYN | Facility: HOSPITAL | Age: 26
End: 2021-12-02
Payer: COMMERCIAL

## 2021-12-02 ENCOUNTER — HOSPITAL ENCOUNTER (INPATIENT)
Facility: HOSPITAL | Age: 26
LOS: 2 days | Discharge: HOME | End: 2021-12-04
Attending: ADVANCED PRACTICE MIDWIFE | Admitting: ADVANCED PRACTICE MIDWIFE
Payer: COMMERCIAL

## 2021-12-02 PROBLEM — Z34.90 ENCOUNTER FOR ELECTIVE INDUCTION OF LABOR: Status: ACTIVE | Noted: 2021-12-02

## 2021-12-02 LAB
ABO/RH(D): NORMAL
ANTIBODY SCREEN: NEGATIVE
BASOPHILS # BLD AUTO: 0 10E3/UL (ref 0–0.2)
BASOPHILS NFR BLD AUTO: 0 %
EOSINOPHIL # BLD AUTO: 0.1 10E3/UL (ref 0–0.7)
EOSINOPHIL NFR BLD AUTO: 1 %
ERYTHROCYTE [DISTWIDTH] IN BLOOD BY AUTOMATED COUNT: 13.3 % (ref 10–15)
HCT VFR BLD AUTO: 34.9 % (ref 35–47)
HGB BLD-MCNC: 12.4 G/DL (ref 11.7–15.7)
IMM GRANULOCYTES # BLD: 0.1 10E3/UL
IMM GRANULOCYTES NFR BLD: 1 %
LYMPHOCYTES # BLD AUTO: 1.9 10E3/UL (ref 0.8–5.3)
LYMPHOCYTES NFR BLD AUTO: 20 %
MCH RBC QN AUTO: 31.5 PG (ref 26.5–33)
MCHC RBC AUTO-ENTMCNC: 35.5 G/DL (ref 31.5–36.5)
MCV RBC AUTO: 89 FL (ref 78–100)
MONOCYTES # BLD AUTO: 0.9 10E3/UL (ref 0–1.3)
MONOCYTES NFR BLD AUTO: 9 %
NEUTROPHILS # BLD AUTO: 6.6 10E3/UL (ref 1.6–8.3)
NEUTROPHILS NFR BLD AUTO: 69 %
NRBC # BLD AUTO: 0 10E3/UL
NRBC BLD AUTO-RTO: 0 /100
PLATELET # BLD AUTO: 223 10E3/UL (ref 150–450)
RBC # BLD AUTO: 3.94 10E6/UL (ref 3.8–5.2)
SPECIMEN EXPIRATION DATE: NORMAL
WBC # BLD AUTO: 9.5 10E3/UL (ref 4–11)

## 2021-12-02 PROCEDURE — 85025 COMPLETE CBC W/AUTO DIFF WBC: CPT | Performed by: ADVANCED PRACTICE MIDWIFE

## 2021-12-02 PROCEDURE — 250N000009 HC RX 250

## 2021-12-02 PROCEDURE — 86780 TREPONEMA PALLIDUM: CPT | Performed by: ADVANCED PRACTICE MIDWIFE

## 2021-12-02 PROCEDURE — 250N000009 HC RX 250: Performed by: NURSE ANESTHETIST, CERTIFIED REGISTERED

## 2021-12-02 PROCEDURE — 250N000013 HC RX MED GY IP 250 OP 250 PS 637: Performed by: ADVANCED PRACTICE MIDWIFE

## 2021-12-02 PROCEDURE — 250N000011 HC RX IP 250 OP 636: Performed by: ADVANCED PRACTICE MIDWIFE

## 2021-12-02 PROCEDURE — 250N000011 HC RX IP 250 OP 636: Performed by: NURSE ANESTHETIST, CERTIFIED REGISTERED

## 2021-12-02 PROCEDURE — 258N000003 HC RX IP 258 OP 636: Performed by: NURSE ANESTHETIST, CERTIFIED REGISTERED

## 2021-12-02 PROCEDURE — 120N000001 HC R&B MED SURG/OB

## 2021-12-02 PROCEDURE — 59400 OBSTETRICAL CARE: CPT | Performed by: NURSE ANESTHETIST, CERTIFIED REGISTERED

## 2021-12-02 PROCEDURE — 250N000009 HC RX 250: Performed by: ADVANCED PRACTICE MIDWIFE

## 2021-12-02 PROCEDURE — 36415 COLL VENOUS BLD VENIPUNCTURE: CPT | Performed by: ADVANCED PRACTICE MIDWIFE

## 2021-12-02 PROCEDURE — 86900 BLOOD TYPING SEROLOGIC ABO: CPT | Performed by: ADVANCED PRACTICE MIDWIFE

## 2021-12-02 PROCEDURE — 258N000003 HC RX IP 258 OP 636: Performed by: ADVANCED PRACTICE MIDWIFE

## 2021-12-02 RX ORDER — OXYTOCIN/0.9 % SODIUM CHLORIDE 30/500 ML
100-340 PLASTIC BAG, INJECTION (ML) INTRAVENOUS CONTINUOUS PRN
Status: DISCONTINUED | OUTPATIENT
Start: 2021-12-02 | End: 2021-12-03

## 2021-12-02 RX ORDER — LIDOCAINE 40 MG/G
CREAM TOPICAL
Status: DISCONTINUED | OUTPATIENT
Start: 2021-12-02 | End: 2021-12-03

## 2021-12-02 RX ORDER — ONDANSETRON 4 MG/1
4 TABLET, ORALLY DISINTEGRATING ORAL EVERY 6 HOURS PRN
Status: DISCONTINUED | OUTPATIENT
Start: 2021-12-02 | End: 2021-12-03

## 2021-12-02 RX ORDER — FENTANYL/BUPIVACAINE/NS/PF 2-1250MCG
PLASTIC BAG, INJECTION (ML) INJECTION
Status: COMPLETED
Start: 2021-12-02 | End: 2021-12-03

## 2021-12-02 RX ORDER — NALBUPHINE HYDROCHLORIDE 10 MG/ML
2.5-5 INJECTION, SOLUTION INTRAMUSCULAR; INTRAVENOUS; SUBCUTANEOUS EVERY 6 HOURS PRN
Status: DISCONTINUED | OUTPATIENT
Start: 2021-12-02 | End: 2021-12-03

## 2021-12-02 RX ORDER — KETOROLAC TROMETHAMINE 30 MG/ML
30 INJECTION, SOLUTION INTRAMUSCULAR; INTRAVENOUS
Status: DISCONTINUED | OUTPATIENT
Start: 2021-12-02 | End: 2021-12-03

## 2021-12-02 RX ORDER — OXYTOCIN 10 [USP'U]/ML
10 INJECTION, SOLUTION INTRAMUSCULAR; INTRAVENOUS
Status: DISCONTINUED | OUTPATIENT
Start: 2021-12-02 | End: 2021-12-03

## 2021-12-02 RX ORDER — LIDOCAINE HYDROCHLORIDE AND EPINEPHRINE 15; 5 MG/ML; UG/ML
INJECTION, SOLUTION EPIDURAL PRN
Status: DISCONTINUED | OUTPATIENT
Start: 2021-12-02 | End: 2021-12-03

## 2021-12-02 RX ORDER — ONDANSETRON 2 MG/ML
4 INJECTION INTRAMUSCULAR; INTRAVENOUS EVERY 6 HOURS PRN
Status: DISCONTINUED | OUTPATIENT
Start: 2021-12-02 | End: 2021-12-03

## 2021-12-02 RX ORDER — SODIUM CHLORIDE, SODIUM LACTATE, POTASSIUM CHLORIDE, CALCIUM CHLORIDE 600; 310; 30; 20 MG/100ML; MG/100ML; MG/100ML; MG/100ML
INJECTION, SOLUTION INTRAVENOUS CONTINUOUS PRN
Status: DISCONTINUED | OUTPATIENT
Start: 2021-12-02 | End: 2021-12-03

## 2021-12-02 RX ORDER — LIDOCAINE HYDROCHLORIDE 10 MG/ML
10 INJECTION, SOLUTION EPIDURAL; INFILTRATION; INTRACAUDAL; PERINEURAL ONCE
Status: DISCONTINUED | OUTPATIENT
Start: 2021-12-02 | End: 2021-12-03

## 2021-12-02 RX ORDER — OXYTOCIN/0.9 % SODIUM CHLORIDE 30/500 ML
1-24 PLASTIC BAG, INJECTION (ML) INTRAVENOUS CONTINUOUS
Status: DISCONTINUED | OUTPATIENT
Start: 2021-12-02 | End: 2021-12-03

## 2021-12-02 RX ORDER — NALOXONE HYDROCHLORIDE 0.4 MG/ML
0.2 INJECTION, SOLUTION INTRAMUSCULAR; INTRAVENOUS; SUBCUTANEOUS
Status: DISCONTINUED | OUTPATIENT
Start: 2021-12-02 | End: 2021-12-03

## 2021-12-02 RX ORDER — NALOXONE HYDROCHLORIDE 0.4 MG/ML
0.4 INJECTION, SOLUTION INTRAMUSCULAR; INTRAVENOUS; SUBCUTANEOUS
Status: DISCONTINUED | OUTPATIENT
Start: 2021-12-02 | End: 2021-12-03

## 2021-12-02 RX ORDER — EPHEDRINE SULFATE 50 MG/ML
5 INJECTION, SOLUTION INTRAMUSCULAR; INTRAVENOUS; SUBCUTANEOUS
Status: DISCONTINUED | OUTPATIENT
Start: 2021-12-02 | End: 2021-12-03

## 2021-12-02 RX ORDER — EPHEDRINE SULFATE 50 MG/ML
INJECTION, SOLUTION INTRAMUSCULAR; INTRAVENOUS; SUBCUTANEOUS
Status: COMPLETED
Start: 2021-12-02 | End: 2021-12-02

## 2021-12-02 RX ORDER — PROCHLORPERAZINE MALEATE 10 MG
10 TABLET ORAL EVERY 6 HOURS PRN
Status: DISCONTINUED | OUTPATIENT
Start: 2021-12-02 | End: 2021-12-03

## 2021-12-02 RX ORDER — TRANEXAMIC ACID 10 MG/ML
1 INJECTION, SOLUTION INTRAVENOUS EVERY 30 MIN PRN
Status: DISCONTINUED | OUTPATIENT
Start: 2021-12-02 | End: 2021-12-03

## 2021-12-02 RX ORDER — OXYTOCIN/0.9 % SODIUM CHLORIDE 30/500 ML
340 PLASTIC BAG, INJECTION (ML) INTRAVENOUS CONTINUOUS PRN
Status: DISCONTINUED | OUTPATIENT
Start: 2021-12-02 | End: 2021-12-03

## 2021-12-02 RX ORDER — FENTANYL CITRATE 50 UG/ML
INJECTION, SOLUTION INTRAMUSCULAR; INTRAVENOUS PRN
Status: DISCONTINUED | OUTPATIENT
Start: 2021-12-02 | End: 2021-12-03

## 2021-12-02 RX ORDER — PENICILLIN G 3000000 [IU]/50ML
3 INJECTION, SOLUTION INTRAVENOUS EVERY 4 HOURS
Status: DISCONTINUED | OUTPATIENT
Start: 2021-12-02 | End: 2021-12-03

## 2021-12-02 RX ORDER — CARBOPROST TROMETHAMINE 250 UG/ML
250 INJECTION, SOLUTION INTRAMUSCULAR
Status: DISCONTINUED | OUTPATIENT
Start: 2021-12-02 | End: 2021-12-03

## 2021-12-02 RX ORDER — TERBUTALINE SULFATE 1 MG/ML
0.25 INJECTION, SOLUTION SUBCUTANEOUS
Status: DISCONTINUED | OUTPATIENT
Start: 2021-12-02 | End: 2021-12-03

## 2021-12-02 RX ORDER — IBUPROFEN 800 MG/1
800 TABLET, FILM COATED ORAL
Status: DISCONTINUED | OUTPATIENT
Start: 2021-12-02 | End: 2021-12-03

## 2021-12-02 RX ORDER — METHYLERGONOVINE MALEATE 0.2 MG/ML
200 INJECTION INTRAVENOUS
Status: DISCONTINUED | OUTPATIENT
Start: 2021-12-02 | End: 2021-12-03

## 2021-12-02 RX ORDER — LIDOCAINE 50 MG/G
OINTMENT TOPICAL DAILY PRN
Status: DISCONTINUED | OUTPATIENT
Start: 2021-12-02 | End: 2021-12-03

## 2021-12-02 RX ORDER — MISOPROSTOL 200 UG/1
400 TABLET ORAL
Status: DISCONTINUED | OUTPATIENT
Start: 2021-12-02 | End: 2021-12-03

## 2021-12-02 RX ORDER — PROCHLORPERAZINE 25 MG
25 SUPPOSITORY, RECTAL RECTAL EVERY 12 HOURS PRN
Status: DISCONTINUED | OUTPATIENT
Start: 2021-12-02 | End: 2021-12-03

## 2021-12-02 RX ORDER — FENTANYL CITRATE 50 UG/ML
INJECTION, SOLUTION INTRAMUSCULAR; INTRAVENOUS
Status: COMPLETED
Start: 2021-12-02 | End: 2021-12-02

## 2021-12-02 RX ADMIN — PENICILLIN G 3 MILLION UNITS: 3000000 INJECTION, SOLUTION INTRAVENOUS at 20:46

## 2021-12-02 RX ADMIN — Medication 5 MG: at 20:36

## 2021-12-02 RX ADMIN — MISOPROSTOL 40 MCG: 200 TABLET ORAL at 17:02

## 2021-12-02 RX ADMIN — MISOPROSTOL 20 MCG: 200 TABLET ORAL at 14:07

## 2021-12-02 RX ADMIN — Medication 2 MILLI-UNITS/MIN: at 23:17

## 2021-12-02 RX ADMIN — PENICILLIN G 3 MILLION UNITS: 3000000 INJECTION, SOLUTION INTRAVENOUS at 12:41

## 2021-12-02 RX ADMIN — MISOPROSTOL 20 MCG: 200 TABLET ORAL at 10:20

## 2021-12-02 RX ADMIN — Medication 5 MG: at 20:22

## 2021-12-02 RX ADMIN — Medication 8 ML/HR: at 20:08

## 2021-12-02 RX ADMIN — Medication 5 MG: at 20:16

## 2021-12-02 RX ADMIN — Medication 5 MG: at 20:33

## 2021-12-02 RX ADMIN — BUPIVACAINE HYDROCHLORIDE 8 ML: 2.5 INJECTION, SOLUTION EPIDURAL; INFILTRATION; INTRACAUDAL at 20:03

## 2021-12-02 RX ADMIN — MISOPROSTOL 20 MCG: 200 TABLET ORAL at 11:27

## 2021-12-02 RX ADMIN — PENICILLIN G 3 MILLION UNITS: 3000000 INJECTION, SOLUTION INTRAVENOUS at 16:42

## 2021-12-02 RX ADMIN — MISOPROSTOL 40 MCG: 200 TABLET ORAL at 15:28

## 2021-12-02 RX ADMIN — Medication 5 MG: at 20:28

## 2021-12-02 RX ADMIN — FENTANYL CITRATE 100 MCG: 50 INJECTION, SOLUTION INTRAMUSCULAR; INTRAVENOUS at 20:45

## 2021-12-02 RX ADMIN — MISOPROSTOL 20 MCG: 200 TABLET ORAL at 09:11

## 2021-12-02 RX ADMIN — Medication 4 ML: at 20:00

## 2021-12-02 RX ADMIN — SODIUM CHLORIDE 5 MILLION UNITS: 9 INJECTION, SOLUTION INTRAVENOUS at 08:57

## 2021-12-02 ASSESSMENT — MIFFLIN-ST. JEOR: SCORE: 1482.97

## 2021-12-02 NOTE — H&P
ADMISSION NOTE FOR Alice Kelly on 2021.    H and P unchanged from prenatal   Lungs: clear  Heart: RRR    ROS:  CONSTITUTIONAL: NEGATIVE for fever, chills, change in weight  RESP: NEGATIVE for significant cough or SOB  CV: NEGATIVE for chest pain, palpitations or peripheral edema  GI: NEGATIVE for nausea, abdominal pain, heartburn, or change in bowel habits  : NEGATIVE for frequency, dysuria, hematuria, vaginal discharge  PSYCHIATRIC: NEGATIVE for changes in mood or affect       Alice Kelly is a 26 year old female  40w6d  Estimated Date of Delivery: 2021 is calculated from Patient's last menstrual period was 2021. [facility] on 2021 at 8:13 AM  for induction of labor.  Indication post dates.     Membranes are intact     PRENATAL COURSE   Prenatal vital signs WNL   Prenatal course was essentially uncomplicated     HISTORIES   Allergies   Allergen Reactions     Sulfa Drugs Rash     Sulfa (sulfonamide antibiotics)      Past Medical History:   Diagnosis Date     Allergic Rhinitis, Seasonal 4/15/2011     Asthma      Chronic urticaria 2020     Congenital melanocytic nevus 2011    Formatting of this note might be different from the original. IMO Update 10/11     Eczema 4/15/2011     Mild intermittent asthma without complication 2020     Other acne 3/12/2012     Rh negative, antepartum 2021      Past Surgical History:   Procedure Laterality Date     PE tube placement        Family History   Problem Relation Age of Onset     Asthma Mother      Allergies Mother      C.A.D. Maternal Grandmother      Lipids Maternal Grandmother      Other - See Comments Maternal Grandmother      C.A.D. Paternal Grandfather      Allergies Father       Social History     Tobacco Use     Smoking status: Never Smoker     Smokeless tobacco: Never Used   Substance Use Topics     Alcohol use: Yes      OB History    Para Term  AB Living   1 0 0 0 0 0   SAB IAB  "Ectopic Multiple Live Births   0 0 0 0 0      # Outcome Date GA Lbr Viraj/2nd Weight Sex Delivery Anes PTL Lv   1 Current                 LABS:     Lab Results   Component Value Date    ABO AB 05/18/2021           Lab Results   Component Value Date    RH Neg 05/18/2021      Rhogam indicated and given at 28 weeks gestation   No results found for: RUBELLAABIGG   No results found for: RPR   No results found for: HIV   Lab Results   Component Value Date    HGB 12.0 08/30/2021    HGB 12.6 05/18/2021      Lab Results   Component Value Date    HEPBANG Nonreactive 05/18/2021      No results found for: GBS   Other significant lab:   None.     PHYSICAL EXAM:     /73 (BP Location: Left arm)   Pulse 91   Temp 98.4  F (36.9  C) (Oral)   Ht 1.575 m (5' 2\")   Wt 78.5 kg (173 lb)   LMP 02/19/2021   SpO2 97%   BMI 31.64 kg/m    Neuro: denies headache and visual disturbances   Edema trace Reflexes +2     Abdomen: gravid, single vertex fetus, non-tender, Estimated Fetal Weight: 8 lb     FETAL HEART TONES Cat I  Cervix 2-3 cm/50% effaced/-2 station well applied/middle, medium    ASSESSMENT:   IUP @ 40w6d for induction of labor.  Indication post dates.  NST reactive.     GBS positive    PLAN:   IOL  Cytotec solution  Initiate GBS protocol     SARAHI Ulloa, NEFTALIM        "

## 2021-12-02 NOTE — PLAN OF CARE
Induction of Labor Admit Note  Alice Kelly  MRN: [id number]  Gestational Age: 40w6d      Alice Kelly presents for induction of labor for medical 40 .  Patient denies contractions, bleeding or ROM.    Past Medical History:   Diagnosis Date     Allergic Rhinitis, Seasonal 4/15/2011     Asthma      Chronic urticaria 2020     Congenital melanocytic nevus 2011    Formatting of this note might be different from the original. IMO Update 10/11     Eczema 4/15/2011     Mild intermittent asthma without complication 2020     Other acne 3/12/2012     Rh negative, antepartum 2021     OB Induction Plan: Completed and available in epic chart.    Jordan Reyes notified of patient's arrival and condition.      Patient is alert and oriented X 3, denies any pain. pain. Patient oriented to room, unit, hourly rounding, and plan of care. Call light within reach.  Explained admission packet with patient bill of rights brochure. Will continue to monitor and document as needed.     Inpatient nursing criteria listed below was met:    Health care directives status obtained and documented: Yes  Patient identifies a surrogate decision maker: Yes   If yes, who: Krishna Robin,    Vaccine assessment done and vaccines ordered if appropriate. Yes  Clergy visit ordered if patient requests: N/A  Skin issues/needs documented:Yes  Isolation needs addressed, if appropriate: N/A  Fall Prevention (Med and High risk): Care plan updated, Education given and documented and signage used: Yes  Care Plan initiated: Yes  Education Documented (Reminder to educate patient if MRSA is present on admission): Yes  Education Assessment documented:Yes  Patient has discharge needs (If yes, please explain): {YES / NO:[id number]    Plan:   Physician will see patient first.

## 2021-12-02 NOTE — PLAN OF CARE
"Labor Shift Note  Data: See Flowsheets activity for contraction and fetal documentation.   Vitals:    21 0743 21 0804 21 1227 21 1500   BP:   109/71 100/52   BP Location:   Left arm Left arm   Patient Position:    Right side   Pulse:   69 70   Resp:   16 14   Temp:   98.7  F (37.1  C) 99.1  F (37.3  C)   TempSrc:   Oral Oral   SpO2:   96% 97%   Weight:  78.5 kg (173 lb)     Height: 1.575 m (5' 2\")      . Signs and symptoms of infection Absent.    Complications in labor: Absent. Support person Krishna/spouse present.  Interventions: Continue uterine/fetal assessment per orders and nursing discretion. Vital Signs per order set. Comfort measures/pain control/labor management: Ambulation, hydration, position changes, birthing ball/sling and tub options to facilitate labor.  Plan: Anticipate . Provide labor/coping assistance as needed by patient and support person.  Observe for and notify care provider of indications of progressing labor, need for pain medications, or signs of fetal/maternal compromise.     Patient has completed 2 doses of antibiotics IV.  "

## 2021-12-03 LAB — T PALLIDUM AB SER QL: NONREACTIVE

## 2021-12-03 PROCEDURE — 59400 OBSTETRICAL CARE: CPT | Performed by: ADVANCED PRACTICE MIDWIFE

## 2021-12-03 PROCEDURE — 0KQM0ZZ REPAIR PERINEUM MUSCLE, OPEN APPROACH: ICD-10-PCS | Performed by: ADVANCED PRACTICE MIDWIFE

## 2021-12-03 PROCEDURE — 250N000011 HC RX IP 250 OP 636: Performed by: ADVANCED PRACTICE MIDWIFE

## 2021-12-03 PROCEDURE — 250N000013 HC RX MED GY IP 250 OP 250 PS 637: Performed by: ADVANCED PRACTICE MIDWIFE

## 2021-12-03 PROCEDURE — 250N000009 HC RX 250: Performed by: ADVANCED PRACTICE MIDWIFE

## 2021-12-03 PROCEDURE — 120N000001 HC R&B MED SURG/OB

## 2021-12-03 PROCEDURE — 250N000011 HC RX IP 250 OP 636

## 2021-12-03 PROCEDURE — 3E0R3NZ INTRODUCTION OF ANALGESICS, HYPNOTICS, SEDATIVES INTO SPINAL CANAL, PERCUTANEOUS APPROACH: ICD-10-PCS | Performed by: ADVANCED PRACTICE MIDWIFE

## 2021-12-03 RX ORDER — LIDOCAINE 50 MG/G
OINTMENT TOPICAL DAILY PRN
Status: DISCONTINUED | OUTPATIENT
Start: 2021-12-03 | End: 2021-12-04 | Stop reason: HOSPADM

## 2021-12-03 RX ORDER — BISACODYL 10 MG
10 SUPPOSITORY, RECTAL RECTAL DAILY PRN
Status: DISCONTINUED | OUTPATIENT
Start: 2021-12-03 | End: 2021-12-04 | Stop reason: HOSPADM

## 2021-12-03 RX ORDER — HYDROCORTISONE 2.5 %
CREAM (GRAM) TOPICAL 3 TIMES DAILY PRN
Status: DISCONTINUED | OUTPATIENT
Start: 2021-12-03 | End: 2021-12-04 | Stop reason: HOSPADM

## 2021-12-03 RX ORDER — MODIFIED LANOLIN
OINTMENT (GRAM) TOPICAL
Status: DISCONTINUED | OUTPATIENT
Start: 2021-12-03 | End: 2021-12-04 | Stop reason: HOSPADM

## 2021-12-03 RX ORDER — TRANEXAMIC ACID 10 MG/ML
1 INJECTION, SOLUTION INTRAVENOUS EVERY 30 MIN PRN
Status: DISCONTINUED | OUTPATIENT
Start: 2021-12-03 | End: 2021-12-04 | Stop reason: HOSPADM

## 2021-12-03 RX ORDER — VITAMIN A ACETATE, .BETA.-CAROTENE, ASCORBIC ACID, CHOLECALCIFEROL, .ALPHA.-TOCOPHEROL ACETATE, DL-, THIAMINE MONONITRATE, RIBOFLAVIN, NIACINAMIDE, PYRIDOXINE HYDROCHLORIDE, FOLIC ACID, CYANOCOBALAMIN, CALCIUM CARBONATE, FERROUS FUMARATE, ZINC OXIDE, AND CUPRIC OXIDE 2000; 2000; 120; 400; 22; 1.84; 3; 20; 10; 1; 12; 200; 27; 25; 2 [IU]/1; [IU]/1; MG/1; [IU]/1; MG/1; MG/1; MG/1; MG/1; MG/1; MG/1; UG/1; MG/1; MG/1; MG/1; MG/1
1 TABLET ORAL DAILY
Status: DISCONTINUED | OUTPATIENT
Start: 2021-12-03 | End: 2021-12-04 | Stop reason: HOSPADM

## 2021-12-03 RX ORDER — ACETAMINOPHEN 325 MG/1
650 TABLET ORAL EVERY 4 HOURS PRN
Status: DISCONTINUED | OUTPATIENT
Start: 2021-12-03 | End: 2021-12-04 | Stop reason: HOSPADM

## 2021-12-03 RX ORDER — METHYLERGONOVINE MALEATE 0.2 MG/ML
200 INJECTION INTRAVENOUS
Status: DISCONTINUED | OUTPATIENT
Start: 2021-12-03 | End: 2021-12-04 | Stop reason: HOSPADM

## 2021-12-03 RX ORDER — OXYTOCIN/0.9 % SODIUM CHLORIDE 30/500 ML
340 PLASTIC BAG, INJECTION (ML) INTRAVENOUS CONTINUOUS PRN
Status: DISCONTINUED | OUTPATIENT
Start: 2021-12-03 | End: 2021-12-04 | Stop reason: HOSPADM

## 2021-12-03 RX ORDER — IBUPROFEN 800 MG/1
800 TABLET, FILM COATED ORAL EVERY 8 HOURS PRN
Status: DISCONTINUED | OUTPATIENT
Start: 2021-12-03 | End: 2021-12-04 | Stop reason: HOSPADM

## 2021-12-03 RX ORDER — DOCUSATE SODIUM 100 MG/1
100 CAPSULE, LIQUID FILLED ORAL DAILY
Status: DISCONTINUED | OUTPATIENT
Start: 2021-12-03 | End: 2021-12-04 | Stop reason: HOSPADM

## 2021-12-03 RX ORDER — MISOPROSTOL 200 UG/1
400 TABLET ORAL
Status: DISCONTINUED | OUTPATIENT
Start: 2021-12-03 | End: 2021-12-04 | Stop reason: HOSPADM

## 2021-12-03 RX ORDER — CARBOPROST TROMETHAMINE 250 UG/ML
250 INJECTION, SOLUTION INTRAMUSCULAR
Status: DISCONTINUED | OUTPATIENT
Start: 2021-12-03 | End: 2021-12-04 | Stop reason: HOSPADM

## 2021-12-03 RX ORDER — OXYTOCIN 10 [USP'U]/ML
10 INJECTION, SOLUTION INTRAMUSCULAR; INTRAVENOUS
Status: DISCONTINUED | OUTPATIENT
Start: 2021-12-03 | End: 2021-12-04 | Stop reason: HOSPADM

## 2021-12-03 RX ADMIN — BENZOCAINE AND LEVOMENTHOL: 200; 5 SPRAY TOPICAL at 15:33

## 2021-12-03 RX ADMIN — IBUPROFEN 800 MG: 800 TABLET ORAL at 23:32

## 2021-12-03 RX ADMIN — PENICILLIN G 3 MILLION UNITS: 3000000 INJECTION, SOLUTION INTRAVENOUS at 01:42

## 2021-12-03 RX ADMIN — DOCUSATE SODIUM 100 MG: 100 CAPSULE, LIQUID FILLED ORAL at 08:05

## 2021-12-03 RX ADMIN — ACETAMINOPHEN 650 MG: 325 TABLET, FILM COATED ORAL at 08:05

## 2021-12-03 RX ADMIN — ACETAMINOPHEN 650 MG: 325 TABLET, FILM COATED ORAL at 13:32

## 2021-12-03 RX ADMIN — PRENATAL VITAMINS-IRON FUMARATE 27 MG IRON-FOLIC ACID 0.8 MG TABLET 1 TABLET: at 08:06

## 2021-12-03 RX ADMIN — LIDOCAINE HYDROCHLORIDE 20 ML: 10; .005 INJECTION, SOLUTION EPIDURAL; INFILTRATION; INTRACAUDAL; PERINEURAL at 03:13

## 2021-12-03 RX ADMIN — WITCH HAZEL: 500 SOLUTION RECTAL; TOPICAL at 15:33

## 2021-12-03 RX ADMIN — Medication 340 ML/HR: at 03:04

## 2021-12-03 RX ADMIN — Medication: at 01:34

## 2021-12-03 RX ADMIN — ACETAMINOPHEN 650 MG: 325 TABLET, FILM COATED ORAL at 22:37

## 2021-12-03 RX ADMIN — IBUPROFEN 800 MG: 800 TABLET ORAL at 08:06

## 2021-12-03 RX ADMIN — ACETAMINOPHEN 650 MG: 325 TABLET, FILM COATED ORAL at 18:37

## 2021-12-03 RX ADMIN — IBUPROFEN 800 MG: 800 TABLET ORAL at 15:32

## 2021-12-03 NOTE — PROGRESS NOTES
Cervix:  8-9 cm  Fetal Heart Rate Tracing: Cat I  Contractions  2 minutes  Comfort level Tolerating well with Epidural    Assessment:   Progressing    Plan:   Continue with POC    SARAHI Ulloa, CNM

## 2021-12-03 NOTE — ANESTHESIA PROCEDURE NOTES
Epidural catheter Procedure Note    Pre-Procedure   Staff -        CRNA: Neal Hyde APRN CRNA       Other Anesthesia Staff: Karen Felix       Performed By: CRNA and COURTNEY       Location: OB       Procedure Start/Stop Times: 12/2/2021 7:55 PM and 12/2/2021 8:05 PM       Pre-Anesthestic Checklist: patient identified, IV checked, risks and benefits discussed, informed consent, monitors and equipment checked, pre-op evaluation and at physician/surgeon's request  Timeout:       Correct Patient: Yes        Correct Procedure: Yes        Correct Site: Yes        Correct Position: Yes   Procedure Documentation  Procedure: epidural catheter       Diagnosis: labor pain       Patient Position: sitting       Patient Prep/Sterile Barriers: sterile gloves, mask, patient draped       Skin prep: Betadine       Local skin infiltrated with 3 mL of 1% lidocaine.        Insertion Site: L3-4. (midline approach).       Technique: LORT saline        SAÚL at 6 cm.       Needle Type: Brille24 needle       Needle Gauge: 18.        Needle Length (Inches): 3.5         Catheter threaded easily.         4 cm epidural space.         Threaded 10 cm at skin.         # of attempts: 1 and  # of redirects:  0    Assessment/Narrative         Paresthesias: No.       Test dose of 4 mL lidocaine 1.5% w/ 1:200,000 epinephrine at 20:00 CST.         Test dose negative, 3 minutes after injection, for signs of intravascular, subdural, or intrathecal injection.       Insertion/Infusion Method: LORT saline       Aspiration negative for Heme or CSF via Epidural Catheter.    Medication(s) Administered   0.125% bupivacaine 5 mL + fentanyl 20 mcg + NS 5 mL (Epidural), 8 mL (Mixture components: bupivacaine HCl (PF) 0.25 % Soln, 5 mL; fentaNYL (PF) 100 MCG/2ML Soln, 20 mcg; sodium chloride 0.9 % Soln, 5 mL)  Medication Administration Time: 12/2/2021 8:03 PM

## 2021-12-03 NOTE — PLAN OF CARE
Pt. Doing well. Feeling some cramping. Rates cramping discomfort 3/10. Tolerating fluids well. Up on the birthing ball, in the tub, and ambulating the halls in between EFM. Call light within reach. Will continue to assess.

## 2021-12-03 NOTE — ANESTHESIA PREPROCEDURE EVALUATION
Anesthesia Pre-Procedure Evaluation    Patient: Alice Kelly   MRN: [id number] : 1995        Preoperative Diagnosis: * No surgery found *    Procedure :           Past Medical History:   Diagnosis Date     Allergic Rhinitis, Seasonal 4/15/2011     Asthma      Chronic urticaria 2020     Congenital melanocytic nevus 2011    Formatting of this note might be different from the original. IMO Update 10/11     Eczema 4/15/2011     Mild intermittent asthma without complication 2020     Other acne 3/12/2012     Rh negative, antepartum 2021      Past Surgical History:   Procedure Laterality Date     PE tube placement        Allergies   Allergen Reactions     Sulfa Drugs Rash     Sulfa (sulfonamide antibiotics)      Social History     Tobacco Use     Smoking status: Never Smoker     Smokeless tobacco: Never Used   Substance Use Topics     Alcohol use: Yes      Wt Readings from Last 1 Encounters:   21 78.5 kg (173 lb)        Anesthesia Evaluation   Pt has had prior anesthetic. Type: General.        ROS/MED HX  ENT/Pulmonary: Comment: rhinitis    (+) Intermittent, asthma     Neurologic:  - neg neurologic ROS     Cardiovascular:       METS/Exercise Tolerance:     Hematologic:  - neg hematologic  ROS     Musculoskeletal:       GI/Hepatic:  - neg GI/hepatic ROS     Renal/Genitourinary:       Endo:     (+) Obesity,     Psychiatric/Substance Use:  - neg psychiatric ROS     Infectious Disease:       Malignancy:       Other:            Physical Exam    Airway        Mallampati: II   TM distance: > 3 FB   Neck ROM: full   Mouth opening: > 3 cm    Respiratory Devices and Support         Dental  no notable dental history         Cardiovascular   cardiovascular exam normal          Pulmonary   pulmonary exam normal            Other findings: Platelets >100    OUTSIDE LABS:  CBC:   Lab Results   Component Value Date    WBC 9.5 2021    WBC 8.9 2021    HGB 12.4 2021    HGB 12.0  08/30/2021    HCT 34.9 (L) 12/02/2021    HCT 34.4 (L) 08/30/2021     12/02/2021     08/30/2021     BMP:   Lab Results   Component Value Date     01/21/2020     09/03/2019    POTASSIUM 4.3 01/21/2020    POTASSIUM 3.9 09/03/2019    CHLORIDE 107 01/21/2020    CHLORIDE 105 09/03/2019    CO2 25 01/21/2020    CO2 23 09/03/2019    BUN 11 01/21/2020    BUN 10 09/03/2019    CR 0.67 01/21/2020    CR 0.65 09/03/2019    GLC 82 01/21/2020     (H) 09/03/2019     COAGS: No results found for: PTT, INR, FIBR  POC: No results found for: BGM, HCG, HCGS  HEPATIC:   Lab Results   Component Value Date    ALBUMIN 3.8 01/21/2020    PROTTOTAL 7.5 01/21/2020    ALT 28 01/21/2020    AST 16 01/21/2020    ALKPHOS 49 01/21/2020    BILITOTAL 0.3 01/21/2020     OTHER:   Lab Results   Component Value Date    HELENA 9.0 01/21/2020    TSH 3.53 01/21/2020       Anesthesia Plan    ASA Status:  2      Anesthesia Type: Epidural.              Consents    Anesthesia Plan(s) and associated risks, benefits, and realistic alternatives discussed. Questions answered and patient/representative(s) expressed understanding.    - Discussed:     - Discussed with:  Patient         Postoperative Care            Comments:    Other Comments: Discussed risks and benefits with patient including itching, sore back, infection, hematoma, spinal headache, CV complications, inability to place, nerve damage. Pt wishes to proceed.        neg OB ROS.       Neal Hyde, APRN CRNA

## 2021-12-03 NOTE — PROGRESS NOTES
Decrease in pain with Epidural.  Reports some discomfort on right side.  Repositioning.  Anesthesia here.  B/P with diastolic below 50.  Ephedrine given as needed.  Resolved.  Will place urinary catheter and perform cervical exam at one hour from Epidural placement.    SARAHI Ulloa, CNM

## 2021-12-03 NOTE — PLAN OF CARE
Assessments completed as charted. B/P: 103/59, T: 98.2, P: 70, R: 14. Rates pain: 0/10  paient denies pain. Voiding without difficulty. Fundus: Midline U/U. Lochia: Moderate. Activity: patient continues to be on bedrest waiting for epidural to wear of.. Infant feeding: Breast feeding going well.           Postpartum breastfeeding assessment completed and education provided, see Patient Education Activity.  Items included in the education are:   proper positioning and latch  effectiveness of feeding  manual expression  handling and storing breastmilk  maintenance of breastfeeding for the first 6 months  sign/symptoms of infant feeding issues requiring referral to qualified health care provider  Postpartum care education provided, see Patient Education activity. Patient denies needs. Will monitor.  Marla Flor RN

## 2021-12-03 NOTE — PLAN OF CARE
Face to face report given with opportunity to observe patient.    Report given to sophia Flor RN   12/3/2021  7:39 AM

## 2021-12-03 NOTE — PROGRESS NOTES
Cervix:  6 cm/ 60%/ 1+  Fetal Heart Rate Tracing: Cat I  Contractions  Readjusting/ pt reacting less frequently to contractions  Comfort level tolerating well with Epidural    Assessment:   Some progress    Plan:   Augmentation with Pitocin if needed    SARAHI Ulloa, NEFTALIM

## 2021-12-03 NOTE — PROGRESS NOTES
Cervix:  5-6 cm/60%/ +1 station  Fetal Heart Rate Tracing: Mostly Cat I   One decel Cat II  Returned to Cat I  Contractions  2-3 minutes  Comfort level Tolerating well with Epidural    Assessment:   Decel noted after catheter placement and cervical exam  Gush of fluid/ head descended from 0 to +1 station  FHTs returned to baseline with interventions/good variability and accels noted  IV infiltrated    Plan:   Changed position  Fluid bolus / restart IV      SARAHI Ulloa, CNM

## 2021-12-03 NOTE — ANESTHESIA CARE TRANSFER NOTE
Patient: Alice Kelly    Procedure: * No procedures listed *       Diagnosis: * No pre-op diagnosis entered *  Diagnosis Additional Information: No value filed.    Anesthesia Type:   Epidural     Note:    Oropharynx: spontaneously breathing  Level of Consciousness: awake  Oxygen Supplementation: room air    Independent Airway: airway patency satisfactory and stable  Dentition: dentition unchanged  Vital Signs Stable: post-procedure vital signs reviewed and stable  Report to RN Given: handoff report given  Patient transferred to: Labor and Delivery    Handoff Report: Identifed the Patient, Identified the Reponsible Provider, Reviewed the pertinent medical history, Discussed the surgical course, Reviewed Intra-OP anesthesia mangement and issues during anesthesia, Set expectations for post-procedure period and Allowed opportunity for questions and acknowledgement of understanding      Vitals:  Vitals Value Taken Time   /62 12/03/21 0400   Temp     Pulse     Resp     SpO2 97 % 12/03/21 0410   Vitals shown include unvalidated device data.    Electronically Signed By: SARAHI Montgomery CRNA  December 3, 2021  4:12 AM

## 2021-12-03 NOTE — PLAN OF CARE
Pt. Breathing well through contractions. Plan to go in the tub. 1810 SROM clear fluid with some light spotting. Provider notified. Will continue to assess.

## 2021-12-03 NOTE — PROGRESS NOTES
Cervix:  7 cm/80%/ 1+  Fetal Heart Rate Tracing: Cat I  Contractions  Difficult to read TOCO strip  Comfort level tolerating well with Epidural    Assessment:   Progressing  Difficult to read TOCO strip    Plan:   IUPC placed    SARAHI Ulloa, NEFTALIM

## 2021-12-03 NOTE — PROGRESS NOTES
Medical record and Rick Score reviewed. No apparent needs noted at this time. Care Transitions will remain available if needs arise.    CAMRYN Bradshaw @[phone] December 3, 2021

## 2021-12-03 NOTE — PLAN OF CARE
VS stable throughout the shift. Pt is up and ambulating. Pain well managed with ibuprofen and tylenol. She is using ice, tucks and dermaplast as well. Bleeding WNL. Supportive  at bedside.      Problem: Adult Inpatient Plan of Care  Goal: Plan of Care Review  12/3/2021 1752 by Natasha Marino RN  Outcome: Improving  12/3/2021 1752 by Natasha Marino RN  Outcome: Improving  Goal: Patient-Specific Goal (Individualized)  12/3/2021 1752 by Natasha Marino RN  Outcome: Improving  12/3/2021 1752 by Natasha Marino RN  Outcome: Improving  Goal: Absence of Hospital-Acquired Illness or Injury  12/3/2021 1752 by Natasha Marino RN  Outcome: Improving  12/3/2021 1752 by Natasha Marino RN  Outcome: Improving  Intervention: Prevent Skin Injury  Recent Flowsheet Documentation  Taken 12/3/2021 1535 by Natasha Marino RN  Body Position: position changed independently  Taken 12/3/2021 0739 by Natasha Marino RN  Body Position: position changed independently  Intervention: Prevent and Manage VTE (Venous Thromboembolism) Risk  Recent Flowsheet Documentation  Taken 12/3/2021 1535 by Natasha Marino RN  VTE Prevention/Management: ambulation promoted  Taken 12/3/2021 0739 by Natasha Marino RN  VTE Prevention/Management: ambulation promoted  Goal: Optimal Comfort and Wellbeing  12/3/2021 1752 by Natasha Marino RN  Outcome: Improving  12/3/2021 1752 by Natasha Marino RN  Outcome: Improving  Goal: Readiness for Transition of Care  12/3/2021 1752 by Natasha Marino RN  Outcome: Improving  12/3/2021 1752 by Natasha Marino RN  Outcome: Improving     Problem: Adjustment to Role Transition (Postpartum Vaginal Delivery)  Goal: Successful Maternal Role Transition  12/3/2021 1752 by Natasha Marino RN  Outcome: Improving  12/3/2021 1752 by Natasha Marino RN  Outcome: Improving  Intervention: Support Maternal Role Transition  Recent Flowsheet Documentation  Taken 12/3/2021 1535 by Natasha Marino RN  Supportive  Measures: active listening utilized  Taken 12/3/2021 0739 by Natasha Marino RN  Supportive Measures: active listening utilized     Problem: Bleeding (Postpartum Vaginal Delivery)  Goal: Hemostasis  12/3/2021 1752 by Natasha Marino RN  Outcome: Improving  12/3/2021 1752 by Natasha Marino RN  Outcome: Improving     Problem: Infection (Postpartum Vaginal Delivery)  Goal: Absence of Infection Signs and Symptoms  12/3/2021 1752 by Natasha Mairno RN  Outcome: Improving  12/3/2021 1752 by Natasha Marino RN  Outcome: Improving     Problem: Pain (Postpartum Vaginal Delivery)  Goal: Acceptable Pain Control  12/3/2021 1752 by Natasha Marino RN  Outcome: Improving  12/3/2021 1752 by Natasha Marino RN  Outcome: Improving  Intervention: Prevent or Manage Pain  Recent Flowsheet Documentation  Taken 12/3/2021 1535 by Natasha Marino RN  Pain Management Interventions: medication (see MAR)  Taken 12/3/2021 0739 by Natasha Marino RN  Pain Management Interventions: medication (see MAR)     Problem: Urinary Retention (Postpartum Vaginal Delivery)  Goal: Effective Urinary Elimination  12/3/2021 1752 by Natasha Marino RN  Outcome: Improving  12/3/2021 1752 by Natasha Marino RN  Outcome: Improving

## 2021-12-03 NOTE — PLAN OF CARE
Labor Shift Note  Data: See Flowsheets activity for contraction and fetal documentation.   Vitals:    21 2210 21 2215 21 2220 21   BP:   109/53    BP Location:       Patient Position:       Pulse:       Resp:       Temp:   98  F (36.7  C)    TempSrc:   Oral    SpO2: 97% 99% 98% 98%   Weight:       Height:       . Signs and symptoms of infection Absent.    Complications in labor: Absent. Support person spouse present.  Interventions: Continue uterine/fetal assessment per orders and nursing discretion. Vital Signs per order set. Comfort measures/pain control/labor management: Frequent position changes to facilitate labor with epidural anesthesia.  Close observation  Plan: Anticipate . Provide labor/coping assistance as needed by patient and support person.  Observe for and notify care provider of indications of progressing labor, need for pain medications, or signs of fetal/maternal compromise.

## 2021-12-03 NOTE — L&D DELIVERY NOTE
Delivery Summary    Alice Kelly MRN# [id number]   Age: 26 year old YOB: 1995     ASSESSMENT & PLAN:    with 2nd degree perineum tear and repair  Breastfeeding    Routine PP cares       Bay Kelly [[id number]]    Labor Event Times    Labor onset date: 21 Onset time: 10:17 PM   Dilation complete date: 12/3/21 Complete time:  1:55 AM   Start pushing date/time: 12/3/2021 0155      Labor Events     labor?: No   steroids: None  Labor Type: Induction/Cervical ripening  Predominate monitoring during 1st stage: intermittent electronic fetal monitoring     Antibiotics received during labor?: Yes  Reason for Antibiotics: GBS  Antibiotics received for GBS: Penicillin     Rupture date/time: 210   Rupture type: Spontaneous rupture of membranes occuring during spontaneous labor or augmentation  Fluid color: Clear, Bloody  Fluid odor: Normal     Induction: Misoprostol  Induction date/time: 21 0910   Cervical ripening date/time:        Augmentation: Oxytocin  1:1 continuous labor support provided by?: RN, provider       Delivery/Placenta Date and Time    Delivery Date: 12/3/21 Delivery Time:  3:02 AM   Placenta Date/Time: 12/3/2021  3:08 AM  Oxytocin given at the time of delivery: after delivery of baby  Delivering clinician: Jordan Reyes APRN CNM   Other personnel present at delivery:  Provider Role             Vaginal Counts     Initial count performed by 2 team members:  Two Team Members   ЕКАТЕРИНА Stover RN       Needles Suture Needles Sponges (RETIRED) Instruments   Initial counts 1 2 15    Added to count       Relief counts       Final counts 1 2 15          Placed during labor Accounted for at the end of labor   FSE No NA   IUPC Yes Yes   Cervadil No NA              Final count performed by 2 team members:  Two Team Members   ni lee      Final count correct?: Yes     Apgars    Living status: Living   1 Minute 5 Minute 10 Minute 15 Minute 20  "Minute   Skin color: 1  1       Heart rate: 2  2       Reflex irritability: 2  2       Muscle tone: 2  2       Respiratory effort: 2  2       Total: 9  9       Apgars assigned by: DREW CROWDER RN AND JAMI AMAYA CNM     Cord    Vessels: 3 Vessels    Cord Complications: None               Cord Blood Disposition: Lab    Gases Sent?: No    Delayed cord clamping?: Yes    Cord Clamping Delay (seconds): 31-60 seconds    Stem cell collection?: No        Resuscitation    Methods: None   Care at Delivery: Vaginal Delivery Note   of viable boy.  Nursery RN S Femi RN present.  Infant with spontaneous cry, to mother's abdomen, dried and stimulated. Martita cares provided.  Mother and baby in stable condition. Baby skin-to-skin with mom. ID bands placed on infant, mom and dad.      [place] Measurements    Weight: 7 lb 12.5 oz Length: 1' 8\"   Head circumference: 35.6 cm Chest circumference: 34.3 cm   Abdominal girth: 31.8 cm     Skin to Skin and Feeding Plan    Skin to skin initiation date/time: 1841    Skin to skin with: Mother  Skin to skin end date/time:        Labor Events and Shoulder Dystocia    Fetal Tracing Prior to Delivery: Category 1, Category 2  Fetal Tracing Comments: Mostly Cat one with exception of a few decels throughout entire labor  Shoulder dystocia present?: Neg     Delivery (Maternal) (Provider to Complete) ([id number])    Episiotomy: None  Perineal lacerations: 2nd Repaired?: Yes   Repair suture: 2-0 Vicryl  Number of repair packets: 2  Genital tract inspection done: Pos     Blood Loss  Mother: Alice Kelly #[id number]   Start of Mother's Information    Delivery Blood Loss  21 2217 - 21 0452    Delivery QBL (mL) Hospital Encounter 305 mL    Total  305 mL         End of Mother's Information  Mother: Alice Kelly #[id number]          Delivery - Provider to Complete ([id number])    Delivering clinician: Jordan Amaya APRN CNM  CNM Care: Exclusive CNM care in labor  Attempted Delivery " Types (Choose all that apply): Spontaneous Vaginal Delivery  Delivery Type (Choose the 1 that will go to the Birth History): Vaginal, Spontaneous                   Other personnel:  Provider Role                    Placenta    Date/Time: 12/3/2021  3:08 AM  Removal: Spontaneous  Disposition: Hospital disposal           Anesthesia    Method: Epidural  Cervical dilation at placement: 4-7                Presentation and Position    Presentation: Vertex    Position: Left Occiput Anterior            Eric//2nd degree perineum tear with repair  Boy 7 lb 12.5 oz     SARAHI Singleton CNM

## 2021-12-03 NOTE — PROGRESS NOTES
Reports increased discomfort.  Requesting Epidural.  Anesthesia paged and responded.    Jordan Reyes, APRN, CNM

## 2021-12-03 NOTE — PLAN OF CARE
Face to face report given with opportunity to observe patient.    Report given to Marla Jackson RN   12/2/2021  7:14 PM

## 2021-12-03 NOTE — PROGRESS NOTES
Cervix:  7-8 cm  Fetal Heart Rate Tracing: Cat I with one decel (Cat II) returned to Cat I  Contractions  1-3 minutes  Comfort level tolerating with Epidural    Assessment:   Progressing  One decel with return to baseline  Pitocin at 2    Plan:   Position change  Pitocin off    SARAHI Ulloa, NEFTALIM

## 2021-12-03 NOTE — LACTATION NOTE
"Initial Lactation Consultation    Alice Kelly                                                                                                    [id number]    Consultation Date: 12/3/2021    Reason for Lactation Referral:routine lactation assessment.    MATERNAL HISTORY   Maternal History: 1st baby, vaginal delivery  History of Breast Surgery: No  Breast Changes During Pregnancy: Yes  Breast Feeding History: No  Maternal Meds: see eMar    MATERNAL ASSESSMENT    Breast Size: average  Nipple Appearance - Left: intact  Nipple Appearance - Right: intact  Nipple Erectility - Left: erect with stimulation  Nipple Erectility - Right: erect with stimulation  Areolas Compressibility: soft  Nipple Size: average  Milk Supply: colostrum    INFANT ASSESSMENT    Oral Anatomy  Mouth: normal  Palate: normal  Jaw: normal  Tongue: normal  Frenulum: normal    FEEDING   Feeding Time: 0845 and 1000  Position: left breast, right breast  Effort to Latch: did not nurse at 0845, both breasts at 1000  Results: attempted breast feed, good breast feed    FEEDING PLAN    Inpatient Feeding Plan: Nurse on demand, responding to infant's feeding cues. Snuggle in skin-to-skin to learn positioning and infant cues. Rooming-in encouraged.    LACTATION COMMENTS   Anticipatory guidance provided in regard to \"baby's second night.\"    Link provided for Maximizing Milk Production at [facility] by Dr. Chanel Berry.    Deep latch explained for proper positioning of breast in infant's mouth, maximizing milk transfer and comfort.  Hand expression taught and return demonstration observed with colostrum present.  [place] signs of satiety reviewed.  \"Ways to know that baby is getting enough\" discussed thoroughly.  [facility] handouts given for Latch, Milk Supply, Hand Expression, Pumping, Engorgement, Returning to Work  Follow-up support information " provided.        __________________________________________________________________________________  JOSE L FRAGA RN IBCLC  12/3/2021

## 2021-12-03 NOTE — PROGRESS NOTES
Cervix:  3+/60%/-2 well applied  Fetal Heart Rate Tracing: Cat I  Contractions  Every 2-3 minutes  Comfort level tolerated well    Assessment:   SROM   Clear fluid    Plan:   Discontinue Cytotec Solution    Will consider augmentation with Pitocin after one hour since last Cytotec dose if needed.    Jordan Reyes, SARAHI, CNM

## 2021-12-04 VITALS
OXYGEN SATURATION: 97 % | SYSTOLIC BLOOD PRESSURE: 113 MMHG | RESPIRATION RATE: 16 BRPM | TEMPERATURE: 98.3 F | BODY MASS INDEX: 31.83 KG/M2 | DIASTOLIC BLOOD PRESSURE: 59 MMHG | HEIGHT: 62 IN | HEART RATE: 84 BPM | WEIGHT: 173 LBS

## 2021-12-04 LAB — HGB BLD-MCNC: 10.9 G/DL (ref 11.7–15.7)

## 2021-12-04 PROCEDURE — 85018 HEMOGLOBIN: CPT | Performed by: ADVANCED PRACTICE MIDWIFE

## 2021-12-04 PROCEDURE — 36415 COLL VENOUS BLD VENIPUNCTURE: CPT | Performed by: ADVANCED PRACTICE MIDWIFE

## 2021-12-04 PROCEDURE — 250N000013 HC RX MED GY IP 250 OP 250 PS 637: Performed by: ADVANCED PRACTICE MIDWIFE

## 2021-12-04 RX ADMIN — IBUPROFEN 800 MG: 800 TABLET ORAL at 10:39

## 2021-12-04 RX ADMIN — ACETAMINOPHEN 650 MG: 325 TABLET, FILM COATED ORAL at 08:19

## 2021-12-04 RX ADMIN — PRENATAL VITAMINS-IRON FUMARATE 27 MG IRON-FOLIC ACID 0.8 MG TABLET 1 TABLET: at 08:20

## 2021-12-04 RX ADMIN — DOCUSATE SODIUM 100 MG: 100 CAPSULE, LIQUID FILLED ORAL at 08:20

## 2021-12-04 NOTE — DISCHARGE INSTRUCTIONS
Pelvic rest for 6 weeks  No vigorous activity, exercise, for 2 weeks.  Then may resume normal activity as tolerated.  Schedule outpatient  lactation f/u 2-4 days prn.    Call Dr. Benitez [phone] as necessary if problems in interim

## 2021-12-04 NOTE — PLAN OF CARE
Face to face report given with opportunity to observe patient.    Report given to Ileana LANGLEY.    Yoana Bonilla RN   12/4/2021  7:23 AM

## 2021-12-04 NOTE — ANESTHESIA POSTPROCEDURE EVALUATION
Patient: Alice Kelly    Procedure: * No procedures listed *       Diagnosis:* No pre-op diagnosis entered *  Diagnosis Additional Information: No value filed.    Anesthesia Type:  Epidural    Note:  Disposition: Inpatient   Postop Pain Control: Uneventful            Sign Out: Well controlled pain   PONV: No   Neuro/Psych: Uneventful            Sign Out: Acceptable/Baseline neuro status   Airway/Respiratory: Uneventful            Sign Out: Acceptable/Baseline resp. status   CV/Hemodynamics: Uneventful            Sign Out: Acceptable CV status; No obvious hypovolemia; No obvious fluid overload   Other NRE: NONE   DID A NON-ROUTINE EVENT OCCUR? No           Last vitals:  Vitals Value Taken Time   BP     Temp     Pulse     Resp     SpO2         Electronically Signed By: SARAHI Melendez CRNA  December 4, 2021  5:49 AM

## 2021-12-04 NOTE — PLAN OF CARE
Patient discharged at 1:29 PM via ambulation accompanied by spouse and son and staff. Prescriptions - None ordered for discharge. All belongings sent with patient.     Discharge instructions reviewed with patient. Listed belongings gathered and returned to patient.     Patient discharged to home.       Surgical Patient   Surgical Procedures during stay: NO  Did patient receive discharge instruction on wound care and recognition of infection symptoms? N/A    MISC  Follow up appointment made:  Yes  Home medications returned to patient: N/A  Patient reports pain was well managed at discharge: Yes

## 2021-12-04 NOTE — PLAN OF CARE
Assessments completed as charted. B/P: 113/59, T: 98.3, P: 84, R: 16. Rates pain: 1/10 . Voiding without difficulty. Fundus: Midline at U . Lochia: Light. Activity: normal activity. Infant feeding: Breast feeding going well.           Postpartum breastfeeding assessment completed and education provided, see Patient Education Activity.  Items included in the education are:     proper positioning and latch    effectiveness of feeding    manual expression    handling and storing breastmilk    maintenance of breastfeeding for the first 6 months    sign/symptoms of infant feeding issues requiring referral to qualified health care provider  Postpartum care education provided, see Patient Education activity. Patient denies needs. Will monitor.  Ileana Gruber RN

## 2021-12-04 NOTE — DISCHARGE SUMMARY
December 4, 2021        DAILY NOTE/DC note  - POSTPARTUM DAY 1    SUBJECTIVE: No complaints    Pain controlled? Yes  Tolerating a regular diet? YES  Ambulating? YES  Voiding without difficulty? Yes  Lochia? minimal  Breastfeeding:  yes      OBJECTIVE:  Vitals:    12/03/21 0739 12/03/21 1100 12/03/21 1534 12/03/21 2332   BP:  110/62 120/66 110/63   BP Location:       Patient Position:       Pulse:    80   Resp: 18 16 16 16   Temp: 98.3  F (36.8  C) 98.3  F (36.8  C) 98.2  F (36.8  C) 98.5  F (36.9  C)   TempSrc: Oral Oral Oral Oral   SpO2:    97%   Weight:       Height:           Constitutional: healthy and alert, NAD    Abdomen:  Uterine fundus is firm, non-tender and at the level of the umbilicus      Extremeties:  Neg edema, non-tender    LABS:  Hemoglobin   Date Value Ref Range Status   12/04/2021 10.9 (L) 11.7 - 15.7 g/dL Final   12/02/2021 12.4 11.7 - 15.7 g/dL Final   05/18/2021 12.6 11.7 - 15.7 g/dL Final   01/21/2020 12.9 11.7 - 15.7 g/dL Final     No results found for: RUBELLAABIGG   Lab Results   Component Value Date    ABO AB 05/18/2021           Lab Results   Component Value Date    RH Neg 05/18/2021        ASSESSMENT:  Post-partum day #1  Normal spontaneous vaginal delivery  Doing well.     PLAN:   Reportable signs and symptoms dicussed with the patient.  Discharge to home.  Discussed meds, restrictions, follow-up.   Call prn if problems in interim.         Yuriy Benitez MD

## 2021-12-06 NOTE — ANESTHESIA POSTPROCEDURE EVALUATION
Patient: Alice Kelly    Procedure: * No procedures listed *       Diagnosis:* No pre-op diagnosis entered *  Diagnosis Additional Information: No value filed.    Anesthesia Type:  Epidural    Note:  Disposition: Inpatient   Postop Pain Control: Uneventful            Sign Out: Well controlled pain   PONV: No   Neuro/Psych: Uneventful            Sign Out: Acceptable/Baseline neuro status   Airway/Respiratory: Uneventful            Sign Out: Acceptable/Baseline resp. status   CV/Hemodynamics: Uneventful            Sign Out: Acceptable CV status; No obvious hypovolemia; No obvious fluid overload   Other NRE: NONE   DID A NON-ROUTINE EVENT OCCUR? No           Last vitals:  Vitals Value Taken Time   BP     Temp     Pulse     Resp     SpO2         Electronically Signed By: SARAHI Montgomery CRNA  December 6, 2021  8:53 AM

## 2021-12-08 ENCOUNTER — PRENATAL OFFICE VISIT (OUTPATIENT)
Dept: OBGYN | Facility: OTHER | Age: 26
End: 2021-12-08
Attending: ADVANCED PRACTICE MIDWIFE
Payer: COMMERCIAL

## 2021-12-08 VITALS
HEIGHT: 62 IN | BODY MASS INDEX: 29.08 KG/M2 | HEART RATE: 65 BPM | WEIGHT: 158 LBS | DIASTOLIC BLOOD PRESSURE: 68 MMHG | OXYGEN SATURATION: 98 % | SYSTOLIC BLOOD PRESSURE: 110 MMHG

## 2021-12-08 PROCEDURE — G0463 HOSPITAL OUTPT CLINIC VISIT: HCPCS

## 2021-12-08 PROCEDURE — 99207 PR NO CHARGE LOS: CPT | Performed by: ADVANCED PRACTICE MIDWIFE

## 2021-12-08 ASSESSMENT — ANXIETY QUESTIONNAIRES
GAD7 TOTAL SCORE: 1
1. FEELING NERVOUS, ANXIOUS, OR ON EDGE: NOT AT ALL
5. BEING SO RESTLESS THAT IT IS HARD TO SIT STILL: NOT AT ALL
7. FEELING AFRAID AS IF SOMETHING AWFUL MIGHT HAPPEN: SEVERAL DAYS
2. NOT BEING ABLE TO STOP OR CONTROL WORRYING: NOT AT ALL
3. WORRYING TOO MUCH ABOUT DIFFERENT THINGS: NOT AT ALL
6. BECOMING EASILY ANNOYED OR IRRITABLE: NOT AT ALL

## 2021-12-08 ASSESSMENT — PATIENT HEALTH QUESTIONNAIRE - PHQ9: 5. POOR APPETITE OR OVEREATING: NOT AT ALL

## 2021-12-08 ASSESSMENT — MIFFLIN-ST. JEOR: SCORE: 1414.93

## 2021-12-08 ASSESSMENT — PAIN SCALES - GENERAL: PAINLEVEL: NO PAIN (0)

## 2021-12-08 NOTE — NURSING NOTE
"Chief Complaint   Patient presents with     Postpartum Care     1wk pp       Initial /68 (BP Location: Left arm, Cuff Size: Adult Regular)   Pulse 65   Ht 1.575 m (5' 2\")   Wt 71.7 kg (158 lb)   LMP 02/19/2021   SpO2 98%   BMI 28.90 kg/m   Estimated body mass index is 28.9 kg/m  as calculated from the following:    Height as of this encounter: 1.575 m (5' 2\").    Weight as of this encounter: 71.7 kg (158 lb).  Medication Reconciliation: complete  Uyen Licona LPN  "

## 2021-12-08 NOTE — PATIENT INSTRUCTIONS
Return to office in 5 weeks for PP care and as needed.    Thank you for allowing Jordan MCCLAIN CNM and our OB team to participate in your care.  If you have a scheduling or an appointment question please contact [facility] Coordinator at their direct line [phone].   ALL nursing questions or concerns can be directed to your OB nurse at: [phone] Kirill Stauffer/Donna

## 2021-12-08 NOTE — PROGRESS NOTES
"Alice Kelly is a 26 year old female  /68 (BP Location: Left arm, Cuff Size: Adult Regular)   Pulse 65   Ht 1.575 m (5' 2\")   Wt 71.7 kg (158 lb)   LMP 02/19/2021   SpO2 98%   BMI 28.90 kg/m    Pleasant without acute distress  Breast feeding:  y        Baby name: Kyle   Spontaneous vaginal delivery: y Stitches: y  PHQ9:  3  Bleeding:  Light to moderate  Vulva:  good  Family planning:  unsure  Other concerns:  none    Assessment:    PP 1 week  BF    Plan:   Continue BF  Return to office: 5 weeks for PP care and as needed    Greater than 20 were spent with this patient  Jordan MCCLAIN CNM    "

## 2021-12-09 ASSESSMENT — ANXIETY QUESTIONNAIRES: GAD7 TOTAL SCORE: 1

## 2021-12-09 ASSESSMENT — PATIENT HEALTH QUESTIONNAIRE - PHQ9: SUM OF ALL RESPONSES TO PHQ QUESTIONS 1-9: 3

## 2021-12-13 ENCOUNTER — HOSPITAL ENCOUNTER (OUTPATIENT)
Dept: OBGYN | Facility: HOSPITAL | Age: 26
Discharge: HOME | End: 2021-12-13
Attending: OBSTETRICS & GYNECOLOGY | Admitting: OBSTETRICS & GYNECOLOGY
Payer: COMMERCIAL

## 2021-12-13 PROCEDURE — G0463 HOSPITAL OUTPT CLINIC VISIT: HCPCS

## 2021-12-13 NOTE — PATIENT INSTRUCTIONS
FEEDING   Feeding Time: 1405 for 30 minutes  Position: left breast, right breast, modified cradle  Effort to Latch: awake and alert, latched easily  Duration of Breast Feeding: Right Breast: 15; Left Breast: 15  Results: excellent breast feed    Volume of Intake:    Birth Weight: 7lb 12.5oz    Discharge from Hospital after delivery weight: 7lb 4.2oz   TODAY 2021    Pre-Weight: 8lb 2.3oz    Post-Weight: 8lb 4.1oz    Total Intake: 50 ml  Output: No output        FEEDING PLAN    Home Feeding Plan: Continue to feed on demand when  elicits feeding cues with deep latch.  Exclusivity explained and encouraged in the early weeks to establish breastfeeding and order in milk supply.  Rooming-in encouraged with explanation of the benefits.  Continue to apply expressed breast milk and Lanolin cream to nipples after feedings for healing and comfort.  Postpartum breastfeeding assessment completed and education provided.  Items included in the education are:     proper positioning and latch    effectiveness of feeding    manual expression    handling and storing breastmilk    maintenance of breastfeeding for the first 6 months    sign/symptoms of infant feeding issues requiring referral to qualified health care provider    LACTATION COMMENTS   Deep latch explained for proper positioning of breast in infant's mouth, maximizing milk transfer and comfort.  Hand expression taught and return demonstration observed with mature milk present.  Reassurance and encouragement provided in regard to mom's concerns about milk supply.  Follow-up support information provided.

## 2021-12-13 NOTE — LACTATION NOTE
Follow-up Lactation Consultation    Alice Kelly                                                                                                   [id number]      Consultation Date: 2021     Reason for Lactation Referral: latch and weight check    Baby's : 12/[birth year]    Primary Care Provider: Baby Boy Allthad Covarrubias NP    History of Present Illness: Alice and , Krishna, and 10 day old Kyle present for latch and weight check. Alice states latch seems fine during the day, but at night she complains of more discomfort. She has a crease across the tops of both nipples. She states her milk is in, she hears swallows, and Kyle is content when finished. He cluster feeds in the morning. He is voiding and having yellow stools.     MATERNAL HISTORY   History of Breast Surgery: No  Breast Changes During Pregnancy: Yes  Breast Feeding History: None  Maternal Meds: daily prenatal vitamin, Vit D, Cranberry    MATERNAL ASSESSMENT    Breast Size: average, symmetrical, soft after feeding and filling prior to feeding  Nipple Appearance - Left: intact  Nipple Appearance - Right: intact  Nipple Erectility - Left: erect with stimulation  Nipple Erectility - Right: erect with stimulation  Areolas Compressibility: soft  Nipple Size: average  Milk Supply: mature    INFANT ASSESSMENT    Oral Anatomy  Mouth: normal  Palate: normal  Jaw: normal  Tongue: normal  Frenulum: normal     FEEDING   Feeding Time: 1405 for 30 minutes  Position: left breast, right breast, modified cradle  Effort to Latch: awake and alert, latched easily  Duration of Breast Feeding: Right Breast: 15; Left Breast: 15  Results: excellent breast feed    Volume of Intake:    Birth Weight: 7lb 12.5oz    Discharge from Hospital after delivery weight: 7lb 4.2oz   TODAY 2021    Pre-Weight: 8lb 2.3oz    Post-Weight: 8lb 4.1oz    Total Intake: 50 ml  Output: No output, voiding and stooling frequently        FEEDING PLAN    Home Feeding Plan: Continue to  feed on demand when  elicits feeding cues with deep latch.  Exclusivity explained and encouraged in the early weeks to establish breastfeeding and order in milk supply.  Rooming-in encouraged with explanation of the benefits.  Continue to apply expressed breast milk and Lanolin cream to nipples after feedings for healing and comfort.  Postpartum breastfeeding assessment completed and education provided.  Items included in the education are:     proper positioning and latch    effectiveness of feeding    manual expression    handling and storing breastmilk    maintenance of breastfeeding for the first 6 months    sign/symptoms of infant feeding issues requiring referral to qualified health care provider    LACTATION COMMENTS   Deep latch explained for proper positioning of breast in infant's mouth, maximizing milk transfer and comfort.  Discussed asymmetrical latch with lower jaw further under nipple.  Look at nipple after feeding to see if nipple is rounded like at the start.   Reassurance and encouragement provided in regard to mom's concerns about milk supply.  Follow-up support information provided.  Parents plan to keep [facility] Well-Child Check with Shauna Covarrubias NP for further support and monitoring.      Face-to-face Time: 60 minutes with assessment and education.    JOSE L FRAGA RN, IBCLC  2021  2:38 PM

## 2022-01-10 ENCOUNTER — TELEPHONE (OUTPATIENT)
Dept: OBGYN | Facility: OTHER | Age: 27
End: 2022-01-10
Payer: COMMERCIAL

## 2022-01-10 NOTE — TELEPHONE ENCOUNTER
Patient states that she is sore again and is having bleeding that maybe coming from her rectum. I moved her appointment from next week until tomorrow.

## 2022-01-10 NOTE — TELEPHONE ENCOUNTER
10:35 AM    Reason for Call: Phone Call    Description: pt would like to speak with nurse of [facility] regarding some post partum concerns. Pt would like a call back asap.    Was an appointment offered for this call? No  If yes : Appointment type              Date    Preferred method for responding to this message: Telephone Call  What is your phone number ? [phone]    If we cannot reach you directly, may we leave a detailed response at the number you provided? Yes    Can this message wait until your PCP/provider returns, if available today? YES    SHYANNE TORRES

## 2022-01-11 ENCOUNTER — PRENATAL OFFICE VISIT (OUTPATIENT)
Dept: OBGYN | Facility: OTHER | Age: 27
End: 2022-01-11
Attending: ADVANCED PRACTICE MIDWIFE
Payer: COMMERCIAL

## 2022-01-11 VITALS
BODY MASS INDEX: 29.06 KG/M2 | HEART RATE: 78 BPM | DIASTOLIC BLOOD PRESSURE: 67 MMHG | HEIGHT: 62 IN | SYSTOLIC BLOOD PRESSURE: 112 MMHG | OXYGEN SATURATION: 98 % | WEIGHT: 157.9 LBS

## 2022-01-11 PROCEDURE — G0463 HOSPITAL OUTPT CLINIC VISIT: HCPCS | Performed by: COUNSELOR

## 2022-01-11 PROCEDURE — G0463 HOSPITAL OUTPT CLINIC VISIT: HCPCS | Mod: 25 | Performed by: COUNSELOR

## 2022-01-11 PROCEDURE — 99207 PR POST PARTUM EXAM: CPT | Performed by: NURSE PRACTITIONER

## 2022-01-11 ASSESSMENT — PAIN SCALES - GENERAL: PAINLEVEL: NO PAIN (0)

## 2022-01-11 ASSESSMENT — MIFFLIN-ST. JEOR: SCORE: 1414.48

## 2022-01-11 NOTE — NURSING NOTE
"Chief Complaint   Patient presents with     Post Partum Exam     6 weeks        Initial /67 (BP Location: Right arm, Patient Position: Sitting, Cuff Size: Adult Regular)   Pulse 78   Ht 1.575 m (5' 2\")   Wt 71.6 kg (157 lb 14.4 oz)   LMP 02/19/2021   SpO2 98%   BMI 28.88 kg/m   Estimated body mass index is 28.88 kg/m  as calculated from the following:    Height as of this encounter: 1.575 m (5' 2\").    Weight as of this encounter: 71.6 kg (157 lb 14.4 oz).  Medication Reconciliation: complete     Rosie Calixto LPN    "

## 2022-01-12 PROBLEM — Z34.00 SUPERVISION OF NORMAL FIRST PREGNANCY, ANTEPARTUM: Status: RESOLVED | Noted: 2021-05-18 | Resolved: 2022-01-12

## 2022-01-12 PROBLEM — Z34.90 ENCOUNTER FOR ELECTIVE INDUCTION OF LABOR: Status: RESOLVED | Noted: 2021-12-02 | Resolved: 2022-01-12

## 2022-01-12 NOTE — PATIENT INSTRUCTIONS
"BREASTFEEDING TIPS  1. Breastfeed every 2-4 hours. If your baby is sleepy - use breast compression, push on chin to \"start up\" baby, switch breasts, undress to diaper and wake before relatching.   Some babies \"cluster\" feed every 1 hour for a while- this is normal. Feed your baby whenever he/she is awake-  even if every hour for a while. This frequent feeding will help you make more milk and encourage your baby to sleep for longer stretches later in the evening or night.    - Position your baby close to you with pillows so he/she is facing you -belly to belly laying horizontally across your lap at the level of your breast and looking a bit \"upwards\" to your breast   -One hand holds the baby's neck behind the ears and the other hand holds your breast  -Baby's nose should start out pointing to your nipple before latching  - Hold your breast in a \"sandwich\" position by gently squeezing your breast in an oval shape and make sure your hands are not covering the areola  This \"nipple sandwich\" will make it easier for your breast to fit inside the baby's mouth-making latching more comfortable for you and baby and preventing sore nipples. Your baby should take a \"mouthful\" of breast!  - You may want to use hand expression to \"prime the pump\" and get a drip of milk out on your nipple to wake baby   (see website: newborns.[employer].edu/Breastfeeding/HandExpression.html)  - Swipe your nipple on baby's upper lip and wait for a BIG open mouth  - YOU bring baby to the breast (hold baby's neck with your fingers just below the ears) and bring baby's head to the breast--leading with the chin.  Try to avoid pushing your breast into baby's mouth- bring baby to you instead!  - Aim to get your baby's bottom lip LOW DOWN ON AREOLA (baby's upper lip just needs to \"clear\" the nipple) .   Your baby should latch onto the areola and NOT just the nipple. That way your baby gets more milk and you don't get sore nipples!      Useful web " sites:  Www.infantrisk.com  Www.aap.org  Www.ibreastfeeding.com  Www.health.[facility].mn.us

## 2022-01-12 NOTE — PROGRESS NOTES
"SUBJECTIVE:  Alice Kelly is a 26 year old female P1 here for a postpartum visit.  She had a  on 21 delivering a healthy baby boy weighing 7 lbs 12 oz at term.      Today's Depression Rating was 1    delivery complications:  No  breast feeding:  Yes, going well.  Denies concerns.   bladder problems:  No  bowel problems/hemorrhoids:  Yes, sharp tearing pain with bowel movements.  Taking colace and denies constipation.  episiotomy/laceration/incision healed? No.  Was feeling better until about 3 weeks ago and has now been more painful and irritated.   vaginal flow:  None  [place]:  No  contraception:  condoms  emotional adjustment:  doing well and happy      OBJECTIVE:  Blood pressure 112/67, pulse 78, height 1.575 m (5' 2\"), weight 71.6 kg (157 lb 14.4 oz), last menstrual period 2021, SpO2 98 %, currently breastfeeding.   General - pleasant female in no acute distress.  : externa, no hemrroids or fissures. No rectal pain with exam.  Perineal repair appears to be open and granulated.  No active bleeding or signs of infection.   Cervix: no lesions or CMT, Uterus: firm, normal sized and nontender, Adnexae: no masses or tenderness.  Rectovaginal - deferred.    ASSESSMENT/Plan:  Perineal granulation tissue - Dr. Benitez notified and came in for pt exam.  Silver nitrate applied and he requests she follow up with him in 2 weeks. Continue with pelvic rest. Continue with stool softeners and high fiber diet.        "

## 2022-01-25 ENCOUNTER — OFFICE VISIT (OUTPATIENT)
Dept: OBGYN | Facility: OTHER | Age: 27
End: 2022-01-25
Attending: OBSTETRICS & GYNECOLOGY
Payer: COMMERCIAL

## 2022-01-25 VITALS
OXYGEN SATURATION: 98 % | TEMPERATURE: 97.6 F | RESPIRATION RATE: 16 BRPM | HEIGHT: 62 IN | SYSTOLIC BLOOD PRESSURE: 122 MMHG | BODY MASS INDEX: 29.44 KG/M2 | WEIGHT: 160 LBS | HEART RATE: 60 BPM | DIASTOLIC BLOOD PRESSURE: 70 MMHG

## 2022-01-25 DIAGNOSIS — N89.8 VAGINAL IRRITATION: Primary | ICD-10-CM

## 2022-01-25 DIAGNOSIS — N89.8 VAGINAL GRANULATION TISSUE: ICD-10-CM

## 2022-01-25 PROBLEM — Z67.91 RH NEGATIVE, ANTEPARTUM: Status: RESOLVED | Noted: 2021-08-30 | Resolved: 2022-01-25

## 2022-01-25 PROBLEM — O26.899 RH NEGATIVE, ANTEPARTUM: Status: RESOLVED | Noted: 2021-08-30 | Resolved: 2022-01-25

## 2022-01-25 LAB
CLUE CELLS: ABNORMAL
TRICHOMONAS, WET PREP: ABNORMAL
WBC'S/HIGH POWER FIELD, WET PREP: ABNORMAL
YEAST, WET PREP: ABNORMAL

## 2022-01-25 PROCEDURE — 87210 SMEAR WET MOUNT SALINE/INK: CPT | Mod: ZL | Performed by: OBSTETRICS & GYNECOLOGY

## 2022-01-25 PROCEDURE — 99212 OFFICE O/P EST SF 10 MIN: CPT | Performed by: OBSTETRICS & GYNECOLOGY

## 2022-01-25 PROCEDURE — G0463 HOSPITAL OUTPT CLINIC VISIT: HCPCS | Mod: 25

## 2022-01-25 PROCEDURE — G0463 HOSPITAL OUTPT CLINIC VISIT: HCPCS

## 2022-01-25 ASSESSMENT — MIFFLIN-ST. JEOR: SCORE: 1424.01

## 2022-01-25 ASSESSMENT — PAIN SCALES - GENERAL: PAINLEVEL: NO PAIN (0)

## 2022-01-25 NOTE — NURSING NOTE
"Chief Complaint   Patient presents with     Follow Up     perineal granulation tissue       Initial /70 (BP Location: Left arm, Cuff Size: Adult Regular)   Pulse 60   Temp 97.6  F (36.4  C)   Resp 16   Ht 1.575 m (5' 2\")   Wt 72.6 kg (160 lb)   LMP 02/19/2021   SpO2 98%   BMI 29.26 kg/m   Estimated body mass index is 29.26 kg/m  as calculated from the following:    Height as of this encounter: 1.575 m (5' 2\").    Weight as of this encounter: 72.6 kg (160 lb).  Medication Reconciliation: complete  Shae Duckworth LPN  "

## 2022-01-25 NOTE — PROGRESS NOTES
S:  25 y/o female 8 weeks pp with h/o second degree perineal tear, subsequent dehiscence/breakdown with granulation tissue.  She does note some increased yellowish discharge/odor but no vaginitis sx or bleeding/pain.  No other complaints.          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 IMO Update 10/11     Eczema 4/15/2011     Mild intermittent asthma without complication 4/17/2020     Other acne 3/12/2012     Rh negative, antepartum 8/30/2021            Past Surgical History:   Procedure Laterality Date     PE tube placement  1997            Social History     Tobacco Use     Smoking status: Never Smoker     Smokeless tobacco: Never Used   Substance Use Topics     Alcohol use: Yes            Family History   Problem Relation Age of Onset     Asthma Mother      Allergies Mother      C.A.D. Maternal Grandmother      Lipids Maternal Grandmother      Other - See Comments Maternal Grandmother      C.A.D. Paternal Grandfather      Allergies Father                Allergies   Allergen Reactions     Sulfa Drugs Rash     Sulfa (sulfonamide antibiotics)            Current Outpatient Medications   Medication Sig Dispense Refill     Cholecalciferol (VITAMIN D-3) 125 MCG (5000 UT) TABS Take 5,000 Units by mouth daily       CRANBERRY PO        Prenatal Vit-Fe Fumarate-FA (PRENATAL MULTIVITAMIN W/IRON) 27-0.8 MG tablet Take 1 tablet by mouth daily       budesonide-formoterol (SYMBICORT) 80-4.5 MCG/ACT Inhaler Inhale 2 puffs into the lungs every 4 hours as needed (cough, shortness of breath, wheezing) (Patient not taking: Reported on 12/8/2021) 1 Inhaler 1     cetirizine  "(ZYRTEC) 10 MG tablet Take 10 mg by mouth daily  (Patient not taking: Reported on 12/8/2021)            Review Of Systems  Constitutional:  Denies fever  GI/ negative except as noted per hpi    O:   /70 (BP Location: Left arm, Cuff Size: Adult Regular)   Pulse 60   Temp 97.6  F (36.4  C)   Resp 16   Ht 1.575 m (5' 2\")   Wt 72.6 kg (160 lb)   LMP 02/19/2021   SpO2 98%   Breastfeeding Yes   BMI 29.26 kg/m    Gen:  NAD, A and O    Abd soft, NT, ND  Lymphadenopathy:  neg  Vulva: BUS wnl.  Posterior perineal dehiscence with granulation tissue present.  Improved by 50% since last evaluation.    Vagina: Pink, moist mucosa with rugae,no lesions  Cervix: No lesions, no CMT  Wet prep done  Anus without lesions  Ext.  neg             A:  Perineal laceration dehiscence/granualtion tissue (improving), vaginal discharge.     P:  Wet prep done.   Granulation tissue treated with silver nitrate.  Continue pelvic rest/perineal care.  F/u 2 weeks or sooner prn problems.    "

## 2022-02-22 ENCOUNTER — OFFICE VISIT (OUTPATIENT)
Dept: OBGYN | Facility: OTHER | Age: 27
End: 2022-02-22
Attending: OBSTETRICS & GYNECOLOGY
Payer: COMMERCIAL

## 2022-02-22 VITALS
HEART RATE: 50 BPM | BODY MASS INDEX: 28.89 KG/M2 | HEIGHT: 62 IN | TEMPERATURE: 96.7 F | OXYGEN SATURATION: 98 % | WEIGHT: 157 LBS | SYSTOLIC BLOOD PRESSURE: 130 MMHG | DIASTOLIC BLOOD PRESSURE: 60 MMHG

## 2022-02-22 DIAGNOSIS — N89.8 VAGINAL GRANULATION TISSUE: Primary | ICD-10-CM

## 2022-02-22 PROCEDURE — 56605 BIOPSY OF VULVA/PERINEUM: CPT | Performed by: OBSTETRICS & GYNECOLOGY

## 2022-02-22 PROCEDURE — 99213 OFFICE O/P EST LOW 20 MIN: CPT | Performed by: OBSTETRICS & GYNECOLOGY

## 2022-02-22 PROCEDURE — 88305 TISSUE EXAM BY PATHOLOGIST: CPT | Mod: TC | Performed by: OBSTETRICS & GYNECOLOGY

## 2022-02-22 PROCEDURE — G0463 HOSPITAL OUTPT CLINIC VISIT: HCPCS

## 2022-02-22 PROCEDURE — 88305 TISSUE EXAM BY PATHOLOGIST: CPT | Mod: 26 | Performed by: PATHOLOGY

## 2022-02-22 ASSESSMENT — PAIN SCALES - GENERAL: PAINLEVEL: NO PAIN (0)

## 2022-02-22 NOTE — NURSING NOTE
"Chief Complaint   Patient presents with     Follow Up       Initial /60 (BP Location: Left arm, Patient Position: Chair, Cuff Size: Adult Regular)   Pulse 50   Temp (!) 96.7  F (35.9  C) (Tympanic)   Ht 1.575 m (5' 2\")   Wt 71.2 kg (157 lb)   SpO2 98%   BMI 28.72 kg/m   Estimated body mass index is 28.72 kg/m  as calculated from the following:    Height as of this encounter: 1.575 m (5' 2\").    Weight as of this encounter: 71.2 kg (157 lb).  Medication Reconciliation: complete  FABIANA BUTLER LPN    "

## 2022-02-23 NOTE — PROGRESS NOTES
S:  F/u pp vaginal granulation tissue.  See my prior evals.  No new complaints.  Denies pain, bleeding.          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 IMO Update 10/11     Eczema 4/15/2011     Mild intermittent asthma without complication 4/17/2020     Other acne 3/12/2012     Rh negative, antepartum 8/30/2021            Past Surgical History:   Procedure Laterality Date     PE tube placement  1997            Social History     Tobacco Use     Smoking status: Never Smoker     Smokeless tobacco: Never Used   Substance Use Topics     Alcohol use: Yes            Family History   Problem Relation Age of Onset     Asthma Mother      Allergies Mother      C.A.D. Maternal Grandmother      Lipids Maternal Grandmother      Other - See Comments Maternal Grandmother      C.A.D. Paternal Grandfather      Allergies Father                Allergies   Allergen Reactions     Sulfa Drugs Rash     Sulfa (sulfonamide antibiotics)            Current Outpatient Medications   Medication Sig Dispense Refill     Cholecalciferol (VITAMIN D-3) 125 MCG (5000 UT) TABS Take 5,000 Units by mouth daily       CRANBERRY PO        Prenatal Vit-Fe Fumarate-FA (PRENATAL MULTIVITAMIN W/IRON) 27-0.8 MG tablet Take 1 tablet by mouth daily       budesonide-formoterol (SYMBICORT) 80-4.5 MCG/ACT Inhaler Inhale 2 puffs into the lungs every 4 hours as needed (cough, shortness of breath, wheezing) (Patient not taking: Reported on 2/22/2022) 1 Inhaler 1     cetirizine (ZYRTEC) 10 MG tablet Take 10 mg by mouth daily  (Patient not taking: Reported on 2/22/2022)            Review Of  "Systems  Constitutional:  Denies fever  GI/ negative except as noted per hpi    O:   /60 (BP Location: Left arm, Patient Position: Chair, Cuff Size: Adult Regular)   Pulse 50   Temp (!) 96.7  F (35.9  C) (Tympanic)   Ht 1.575 m (5' 2\")   Wt 71.2 kg (157 lb)   SpO2 98%   BMI 28.72 kg/m    Gen:  NAD, A and O    Abd soft, NT, ND  Lymphadenopathy:  neg  Vulva: No lesions, erythema, BUS wnl  Pedunculated granulation tissue posterior introitus on left.  Informed consent for excision obtained.  The area was prepped with betadine and anesthetized with 1% lidocaine and the granulation tissue excised at its base.  Sent to path.  Base treated with silver Nitrate.  Pt tolerated well.   Vagina: Pink, moist mucosa with rugae  Ext.  neg             A:  Vaginal granulation tissue excision.     P:  Pelvic rest x 1wk.  Then f/u prn if recurrent problems.   Pt has my card and phone number to call as needed if problems in the interim or she does not hear her results.       "

## 2022-02-25 LAB
PATH REPORT.COMMENTS IMP SPEC: NORMAL
PATH REPORT.COMMENTS IMP SPEC: NORMAL
PATH REPORT.FINAL DX SPEC: NORMAL
PATH REPORT.GROSS SPEC: NORMAL
PATH REPORT.MICROSCOPIC SPEC OTHER STN: NORMAL
PATH REPORT.RELEVANT HX SPEC: NORMAL
PHOTO IMAGE: NORMAL

## 2022-06-13 ENCOUNTER — OFFICE VISIT (OUTPATIENT)
Dept: FAMILY MEDICINE | Facility: OTHER | Age: 27
End: 2022-06-13
Attending: NURSE PRACTITIONER
Payer: COMMERCIAL

## 2022-06-13 VITALS
OXYGEN SATURATION: 98 % | BODY MASS INDEX: 29.19 KG/M2 | DIASTOLIC BLOOD PRESSURE: 70 MMHG | WEIGHT: 159.6 LBS | HEART RATE: 64 BPM | SYSTOLIC BLOOD PRESSURE: 124 MMHG | RESPIRATION RATE: 18 BRPM | TEMPERATURE: 97.2 F

## 2022-06-13 DIAGNOSIS — Z76.89 ENCOUNTER TO ESTABLISH CARE: Primary | ICD-10-CM

## 2022-06-13 DIAGNOSIS — N39.3 STRESS INCONTINENCE OF URINE: ICD-10-CM

## 2022-06-13 DIAGNOSIS — Z71.89 ADVANCED DIRECTIVES, COUNSELING/DISCUSSION: ICD-10-CM

## 2022-06-13 DIAGNOSIS — J45.20 MILD INTERMITTENT ASTHMA WITHOUT COMPLICATION: ICD-10-CM

## 2022-06-13 PROCEDURE — G0463 HOSPITAL OUTPT CLINIC VISIT: HCPCS

## 2022-06-13 PROCEDURE — 99203 OFFICE O/P NEW LOW 30 MIN: CPT | Performed by: NURSE PRACTITIONER

## 2022-06-13 ASSESSMENT — ASTHMA QUESTIONNAIRES
QUESTION_5 LAST FOUR WEEKS HOW WOULD YOU RATE YOUR ASTHMA CONTROL: COMPLETELY CONTROLLED
QUESTION_4 LAST FOUR WEEKS HOW OFTEN HAVE YOU USED YOUR RESCUE INHALER OR NEBULIZER MEDICATION (SUCH AS ALBUTEROL): NOT AT ALL
ACT_TOTALSCORE: 25
QUESTION_3 LAST FOUR WEEKS HOW OFTEN DID YOUR ASTHMA SYMPTOMS (WHEEZING, COUGHING, SHORTNESS OF BREATH, CHEST TIGHTNESS OR PAIN) WAKE YOU UP AT NIGHT OR EARLIER THAN USUAL IN THE MORNING: NOT AT ALL
ACT_TOTALSCORE: 25
QUESTION_2 LAST FOUR WEEKS HOW OFTEN HAVE YOU HAD SHORTNESS OF BREATH: NOT AT ALL
QUESTION_1 LAST FOUR WEEKS HOW MUCH OF THE TIME DID YOUR ASTHMA KEEP YOU FROM GETTING AS MUCH DONE AT WORK, SCHOOL OR AT HOME: NONE OF THE TIME

## 2022-06-13 ASSESSMENT — ANXIETY QUESTIONNAIRES
1. FEELING NERVOUS, ANXIOUS, OR ON EDGE: NOT AT ALL
IF YOU CHECKED OFF ANY PROBLEMS ON THIS QUESTIONNAIRE, HOW DIFFICULT HAVE THESE PROBLEMS MADE IT FOR YOU TO DO YOUR WORK, TAKE CARE OF THINGS AT HOME, OR GET ALONG WITH OTHER PEOPLE: NOT DIFFICULT AT ALL
3. WORRYING TOO MUCH ABOUT DIFFERENT THINGS: NOT AT ALL
6. BECOMING EASILY ANNOYED OR IRRITABLE: NOT AT ALL
7. FEELING AFRAID AS IF SOMETHING AWFUL MIGHT HAPPEN: NOT AT ALL
2. NOT BEING ABLE TO STOP OR CONTROL WORRYING: NOT AT ALL
GAD7 TOTAL SCORE: 0
GAD7 TOTAL SCORE: 0
5. BEING SO RESTLESS THAT IT IS HARD TO SIT STILL: NOT AT ALL

## 2022-06-13 ASSESSMENT — PATIENT HEALTH QUESTIONNAIRE - PHQ9
SUM OF ALL RESPONSES TO PHQ QUESTIONS 1-9: 0
5. POOR APPETITE OR OVEREATING: NOT AT ALL

## 2022-06-13 ASSESSMENT — PAIN SCALES - GENERAL: PAINLEVEL: NO PAIN (0)

## 2022-06-13 NOTE — PROGRESS NOTES
Assessment & Plan        Encounter to establish care  - Chart updated      Mild intermittent asthma without complication  - Continue plan of care      Stress incontinence of urine  - Kegel exercises  - Follow-up as needed      Advanced directives, counseling/discussion  - Discussed        Return in about 6 months (around 12/13/2022).      Shauna Covarrubias, JUAN  [facility] TREE Jenkins is a 26 year old who presents for the following health issues        New patient establish:      Asthma Follow-Up  Was ACT completed today?    Yes    ACT Total Scores 6/13/2022   ACT TOTAL SCORE (Goal Greater than or Equal to 20) 25   In the past 12 months, how many times did you visit the emergency room for your asthma without being admitted to the hospital? 0   In the past 12 months, how many times were you hospitalized overnight because of your asthma? 0             How many days per week do you miss taking your asthma controller medication?  0    Please describe any recent triggers for your asthma: None    Have you had any Emergency Room Visits, Urgent Care Visits, or Hospital Admissions since your last office visit?  No        Concern - Urine leakage  Onset: 6 month  Description: leaking of urine daily  Progression of Symptoms:  same  Accompanying Signs & Symptoms: none  Previous history of similar problem: no  Precipitating factors:        Worsened by: nothing  Alleviating factors:        Improved by: nothing  Therapies tried and outcome:  none         PHQ 5/18/2021 12/8/2021 6/13/2022   PHQ-9 Total Score 1 3 0   Q9: Thoughts of better off dead/self-harm past 2 weeks Not at all Not at all Not at all       JELLY-7 SCORE 5/18/2021 12/8/2021 6/13/2022   Total Score 0 1 0           Patient Active Problem List   Diagnosis     Chronic urticaria     Mild intermittent asthma without complication     Other acne     Advanced directives, counseling/discussion     Past Surgical History:   Procedure Laterality Date     PE  tube placement  1997       Social History     Tobacco Use     Smoking status: Never Smoker     Smokeless tobacco: Never Used   Substance Use Topics     Alcohol use: Yes     Family History   Problem Relation Age of Onset     Asthma Mother      Allergies Mother      C.A.D. Maternal Grandmother      Lipids Maternal Grandmother      Other - See Comments Maternal Grandmother      [place]. Paternal Grandfather      Allergies Father            Current Outpatient Medications   Medication Sig Dispense Refill     budesonide-formoterol (SYMBICORT) 80-4.5 MCG/ACT Inhaler Inhale 2 puffs into the lungs every 4 hours as needed (cough, shortness of breath, wheezing) 1 Inhaler 1     Cholecalciferol (VITAMIN D-3) 125 MCG (5000 UT) TABS Take 5,000 Units by mouth daily       CRANBERRY PO        Prenatal Vit-Fe Fumarate-FA (PRENATAL MULTIVITAMIN W/IRON) 27-0.8 MG tablet Take 1 tablet by mouth daily         Allergies   Allergen Reactions     Sulfa Drugs Rash     Sulfa (sulfonamide antibiotics)     Recent Labs   Lab Test 01/21/20  0935 09/03/19  1545   ALT 28 38   CR 0.67 0.65   GFRESTIMATED >90 >90   GFRESTBLACK >90 >90   POTASSIUM 4.3 3.9   TSH 3.53  --         BP Readings from Last 3 Encounters:   06/13/22 124/70   02/22/22 130/60   01/25/22 122/70    Wt Readings from Last 3 Encounters:   06/13/22 72.4 kg (159 lb 9.6 oz)   02/22/22 71.2 kg (157 lb)   01/25/22 72.6 kg (160 lb)              Review of Systems   Constitutional, HEENT, cardiovascular, pulmonary, gi and gu systems are negative, except as otherwise noted.          Objective    /70 (BP Location: Left arm, Patient Position: Sitting, Cuff Size: Adult Regular)   Pulse 64   Temp 97.2  F (36.2  C) (Tympanic)   Resp 18   Wt 72.4 kg (159 lb 9.6 oz)   SpO2 98%   BMI 29.19 kg/m    Body mass index is 29.19 kg/m .         Physical Exam   GENERAL: healthy, alert and no distress  EYES: Eyes grossly normal to inspection, PERRL and conjunctivae and sclerae normal  NECK: no  adenopathy, no asymmetry, masses, or scars and thyroid normal to palpation  RESP: lungs clear to auscultation - no rales, rhonchi or wheezes  CV: regular rate and rhythm, normal S1 S2, no S3 or S4, no murmur, click or rub, no peripheral edema and peripheral pulses strong  SKIN: no suspicious lesions or rashes  PSYCH: mentation appears normal, affect normal/bright

## 2022-06-13 NOTE — LETTER
My Asthma Action Plan    Name: Alice Kelly   YOB: 1995  Date: 6/13/2022   My doctor: Shauna Covarrubias CNP   My clinic: [facility]        My Rescue Medicine:   Symbicort  2-4 puffs EVERY 4 HOURS as needed. Use a spacer if recommended by your provider.   My Asthma Severity:   Intermittent / Exercise Induced  Know your asthma triggers: Patient is unaware of triggers             GREEN ZONE   Good Control    I feel good    No cough or wheeze    Can work, sleep and play without asthma symptoms       Take your asthma control medicine every day.     1. If exercise triggers your asthma, take your rescue medication    15 minutes before exercise or sports, and    During exercise if you have asthma symptoms  2. Spacer to use with inhaler: If you have a spacer, make sure to use it with your inhaler             YELLOW ZONE Getting Worse  I have ANY of these:    I do not feel good    Cough or wheeze    Chest feels tight    Wake up at night   1. Keep taking your Green Zone medications  2. Start taking your rescue medicine:    every 20 minutes for up to 1 hour. Then every 4 hours for 24-48 hours.  3. If you stay in the Yellow Zone for more than 12-24 hours, contact your doctor.  4. If you do not return to the Green Zone in 12-24 hours or you get worse, start taking your oral steroid medicine if prescribed by your provider.           RED ZONE Medical Alert - Get Help  I have ANY of these:    I feel awful    Medicine is not helping    Breathing getting harder    Trouble walking or talking    Nose opens wide to breathe       1. Take your rescue medicine NOW  2. If your provider has prescribed an oral steroid medicine, start taking it NOW  3. Call your doctor NOW  4. If you are still in the Red Zone after 20 minutes and you have not reached your doctor:    Take your rescue medicine again and    Call 911 or go to the emergency room right away    See your regular doctor within 2 weeks of an Emergency  Room or Urgent Care visit for follow-up treatment.          Annual Reminders:  Meet with Asthma Educator,  Flu Shot in the Fall, consider Pneumonia Vaccination for patients with asthma (aged 19 and older).    Pharmacy:    [id number] - [place], [place] - [address] AT [facility] [address]  [facility] - [place], [place] - [address] [facility]    Electronically signed by Shauna Covarrubias CNP   Date: 06/13/22                    Asthma Triggers  How To Control Things That Make Your Asthma Worse    Triggers are things that make your asthma worse.  Look at the list below to help you find your triggers and   what you can do about them. You can help prevent asthma flare-ups by staying away from your triggers.      Trigger                                                          What you can do   Cigarette Smoke  Tobacco smoke can make asthma worse. Do not allow smoking in your home, car or around you.  Be sure no one smokes at a child s day care or school.  If you smoke, ask your health care provider for ways to help you quit.  Ask family members to quit too.  Ask your health care provider for a referral to Quit Plan to help you quit smoking, or call 8-736-964-PLAN.     Colds, Flu, Bronchitis  These are common triggers of asthma. Wash your hands often.  Don t touch your eyes, nose or mouth.  Get a flu shot every year.     Dust Mites  These are tiny bugs that live in cloth or carpet. They are too small to see. Wash sheets and blankets in hot water every week.   Encase pillows and mattress in dust mite proof covers.  Avoid having carpet if you can. If you have carpet, vacuum weekly.   Use a dust mask and HEPA vacuum.   Pollen and Outdoor Mold  Some people are allergic to trees, grass, or weed pollen, or molds. Try to keep your windows closed.  Limit time out doors when pollen count is high.   Ask you health care provider about taking medicine during allergy season.     Animal Dander  Some people are allergic to skin  flakes, urine or saliva from pets with fur or feathers. Keep pets with fur or feathers out of your home.    If you can t keep the pet outdoors, then keep the pet out of your bedroom.  Keep the bedroom door closed.  Keep pets off cloth furniture and away from stuffed toys.     Mice, Rats, and Cockroaches  Some people are allergic to the waste from these pests.   Cover food and garbage.  Clean up spills and food crumbs.  Store grease in the refrigerator.   Keep food out of the bedroom.   Indoor Mold  This can be a trigger if your home has high moisture. Fix leaking faucets, pipes, or other sources of water.   Clean moldy surfaces.  Dehumidify basement if it is damp and smelly.   Smoke, Strong Odors, and Sprays  These can reduce air quality. Stay away from strong odors and sprays, such as perfume, powder, hair spray, paints, smoke incense, paint, cleaning products, candles and new carpet.   Exercise or Sports  Some people with asthma have this trigger. Be active!  Ask your doctor about taking medicine before sports or exercise to prevent symptoms.    Warm up for 5-10 minutes before and after sports or exercise.     Other Triggers of Asthma  Cold air:  Cover your nose and mouth with a scarf.  Sometimes laughing or crying can be a trigger.  Some medicines and food can trigger asthma.

## 2022-06-13 NOTE — PATIENT INSTRUCTIONS
Assessment & Plan        Encounter to establish care  - Chart updated      Mild intermittent asthma without complication  - Continue plan of care      Stress incontinence of urine  - Kegel exercises  - Follow-up as needed      Advanced directives, counseling/discussion  - Discussed        Return in about 6 months (around 12/13/2022).      Shauna Covarrubias, JUAN  [facility]

## 2022-06-13 NOTE — NURSING NOTE
"Chief Complaint   Patient presents with     Establish Care       Initial /70 (BP Location: Left arm, Patient Position: Sitting, Cuff Size: Adult Regular)   Pulse 64   Temp 97.2  F (36.2  C) (Tympanic)   Resp 18   Wt 72.4 kg (159 lb 9.6 oz)   SpO2 98%   BMI 29.19 kg/m   Estimated body mass index is 29.19 kg/m  as calculated from the following:    Height as of 2/22/22: 1.575 m (5' 2\").    Weight as of this encounter: 72.4 kg (159 lb 9.6 oz).  Medication Reconciliation: complete  Yocasta Sargent LPN  "

## 2022-09-04 ENCOUNTER — HEALTH MAINTENANCE LETTER (OUTPATIENT)
Age: 27
End: 2022-09-04

## 2023-01-02 LAB
ABO/RH(D): NORMAL
ANTIBODY SCREEN: NEGATIVE
SPECIMEN EXPIRATION DATE: NORMAL

## 2023-01-03 ENCOUNTER — PRENATAL OFFICE VISIT (OUTPATIENT)
Dept: OBGYN | Facility: OTHER | Age: 28
End: 2023-01-03
Attending: OBSTETRICS & GYNECOLOGY
Payer: COMMERCIAL

## 2023-01-03 VITALS
HEIGHT: 62 IN | SYSTOLIC BLOOD PRESSURE: 130 MMHG | BODY MASS INDEX: 27.6 KG/M2 | WEIGHT: 150 LBS | HEART RATE: 100 BPM | OXYGEN SATURATION: 100 % | DIASTOLIC BLOOD PRESSURE: 60 MMHG

## 2023-01-03 DIAGNOSIS — Z34.91 PREGNANCY WITH UNCERTAIN DATES IN FIRST TRIMESTER: ICD-10-CM

## 2023-01-03 DIAGNOSIS — Z34.80 PREGNANCY IN MULTIGRAVIDA: Primary | ICD-10-CM

## 2023-01-03 LAB
ALBUMIN UR-MCNC: 20 MG/DL
AMPHETAMINES UR QL: NOT DETECTED
APPEARANCE UR: CLEAR
BARBITURATES UR QL SCN: NOT DETECTED
BENZODIAZ UR QL SCN: NOT DETECTED
BILIRUB UR QL STRIP: NEGATIVE
BUPRENORPHINE UR QL: NOT DETECTED
CANNABINOIDS UR QL: NOT DETECTED
COCAINE UR QL SCN: NOT DETECTED
COLOR UR AUTO: YELLOW
D-METHAMPHET UR QL: NOT DETECTED
ERYTHROCYTE [DISTWIDTH] IN BLOOD BY AUTOMATED COUNT: 12.3 % (ref 10–15)
GLUCOSE UR STRIP-MCNC: NEGATIVE MG/DL
HCT VFR BLD AUTO: 35.9 % (ref 35–47)
HGB BLD-MCNC: 12.7 G/DL (ref 11.7–15.7)
HGB UR QL STRIP: NEGATIVE
KETONES UR STRIP-MCNC: NEGATIVE MG/DL
LEUKOCYTE ESTERASE UR QL STRIP: NEGATIVE
MCH RBC QN AUTO: 30.2 PG (ref 26.5–33)
MCHC RBC AUTO-ENTMCNC: 35.4 G/DL (ref 31.5–36.5)
MCV RBC AUTO: 85 FL (ref 78–100)
METHADONE UR QL SCN: NOT DETECTED
MUCOUS THREADS #/AREA URNS LPF: PRESENT /LPF
NITRATE UR QL: NEGATIVE
OPIATES UR QL SCN: NOT DETECTED
OXYCODONE UR QL SCN: NOT DETECTED
PCP UR QL SCN: NOT DETECTED
PH UR STRIP: 6.5 [PH] (ref 4.7–8)
PLATELET # BLD AUTO: 306 10E3/UL (ref 150–450)
PROPOXYPH UR QL: NOT DETECTED
RBC # BLD AUTO: 4.21 10E6/UL (ref 3.8–5.2)
RBC URINE: 1 /HPF
SP GR UR STRIP: 1.03 (ref 1–1.03)
SQUAMOUS EPITHELIAL: 0 /HPF
TRICYCLICS UR QL SCN: NOT DETECTED
UROBILINOGEN UR STRIP-MCNC: 2 MG/DL
WBC # BLD AUTO: 6.1 10E3/UL (ref 4–11)
WBC URINE: 1 /HPF

## 2023-01-03 PROCEDURE — 85027 COMPLETE CBC AUTOMATED: CPT | Mod: ZL | Performed by: OBSTETRICS & GYNECOLOGY

## 2023-01-03 PROCEDURE — 99207 PR FIRST OB VISIT: CPT | Performed by: OBSTETRICS & GYNECOLOGY

## 2023-01-03 PROCEDURE — 87340 HEPATITIS B SURFACE AG IA: CPT | Mod: ZL | Performed by: OBSTETRICS & GYNECOLOGY

## 2023-01-03 PROCEDURE — 76817 TRANSVAGINAL US OBSTETRIC: CPT | Performed by: OBSTETRICS & GYNECOLOGY

## 2023-01-03 PROCEDURE — 86762 RUBELLA ANTIBODY: CPT | Mod: ZL | Performed by: OBSTETRICS & GYNECOLOGY

## 2023-01-03 PROCEDURE — 87086 URINE CULTURE/COLONY COUNT: CPT | Mod: ZL | Performed by: OBSTETRICS & GYNECOLOGY

## 2023-01-03 PROCEDURE — G0463 HOSPITAL OUTPT CLINIC VISIT: HCPCS | Mod: 25

## 2023-01-03 PROCEDURE — 87389 HIV-1 AG W/HIV-1&-2 AB AG IA: CPT | Mod: ZL | Performed by: OBSTETRICS & GYNECOLOGY

## 2023-01-03 PROCEDURE — 86901 BLOOD TYPING SEROLOGIC RH(D): CPT | Performed by: OBSTETRICS & GYNECOLOGY

## 2023-01-03 PROCEDURE — 86803 HEPATITIS C AB TEST: CPT | Mod: ZL | Performed by: OBSTETRICS & GYNECOLOGY

## 2023-01-03 PROCEDURE — 36415 COLL VENOUS BLD VENIPUNCTURE: CPT | Mod: ZL | Performed by: OBSTETRICS & GYNECOLOGY

## 2023-01-03 PROCEDURE — 86780 TREPONEMA PALLIDUM: CPT | Mod: ZL | Performed by: OBSTETRICS & GYNECOLOGY

## 2023-01-03 PROCEDURE — 80306 DRUG TEST PRSMV INSTRMNT: CPT | Mod: ZL | Performed by: OBSTETRICS & GYNECOLOGY

## 2023-01-03 PROCEDURE — 81001 URINALYSIS AUTO W/SCOPE: CPT | Mod: ZL | Performed by: OBSTETRICS & GYNECOLOGY

## 2023-01-03 NOTE — PROGRESS NOTES
"  HPI:  Alice Kelly is a 27 year old female  No LMP recorded. Patient is pregnant. at Unknown, Estimated Date of Delivery: Data Unavailable.  She denies vaginal bleeding, vomiting and abdominal pain.   No other c/o.    Past Medical History:   Diagnosis Date     Allergic Rhinitis, Seasonal 4/15/2011     Asthma      Chronic urticaria 2020     Congenital melanocytic nevus 2011    Formatting of this note might be different from the original. IMO Update 10/11     Eczema 4/15/2011     Mild intermittent asthma without complication 2020     Other acne 3/12/2012     Rh negative, antepartum 2021       Past Surgical History:   Procedure Laterality Date     PE tube placement         Allergies: Sulfa drugs     ROS:   Denies fever, wt loss   Neg /GI other than per HPI      EXAM:  Blood pressure 130/60, pulse 100, height 1.575 m (5' 2\"), weight 68 kg (150 lb), SpO2 100 %, currently breastfeeding.   BMI= Body mass index is 27.44 kg/m .  General - pleasant female in no acute distress.  Abdomen - soft, nontender, nondistended, no hepatosplenomegaly.  Pelvic - EG: normal adult female, BUS: within normal limits,Rectovaginal - deferred.    US:    Transvaginal:Yes  Yolk sac present:Yes  CRL:  17mm  FCA present:Yes  EGA 8w 0d  ANNALEE:cwd          ASSESSMENT/PLAN:  (Z34.03) Supervision of normal first pregnancy in third trimester  (primary encounter diagnosis)  Comment: Viable IUP with concordant dates.   Plan: HIV Antigen Antibody Combo, Rubella Antibody         IgG, Hepatitis B surface antigen, Treponema Abs        w Reflex to RPR and Titer, CBC with platelets,         Hepatitis C antibody, UA with Microscopic,         Urine Culture, Urine Drugs of Abuse Screen         Panel 13, ABO/Rh type and screen      FOB with bicuspid heart valve  Plan: Echo Pediatric (TTE) Complete 22-24 wks               1st Trimester precautions and testing discussed.  New OB Labs ordered and exam scheduled.  Aneuploidy testing " options discussed.  Patient has my card and phone number to call prn if problems in interim.    Yuriy Benitez MD

## 2023-01-04 LAB
HBV SURFACE AG SERPL QL IA: NONREACTIVE
HCV AB SERPL QL IA: NONREACTIVE
HIV 1+2 AB+HIV1 P24 AG SERPL QL IA: NONREACTIVE
RUBV IGG SERPL QL IA: 2.01 INDEX
RUBV IGG SERPL QL IA: POSITIVE
T PALLIDUM AB SER QL: NONREACTIVE

## 2023-01-05 PROBLEM — Z67.91 RH NEGATIVE, ANTEPARTUM: Status: ACTIVE | Noted: 2021-08-30

## 2023-01-05 PROBLEM — O26.899 RH NEGATIVE, ANTEPARTUM: Status: ACTIVE | Noted: 2021-08-30

## 2023-01-05 LAB — BACTERIA UR CULT: NO GROWTH

## 2023-01-31 ENCOUNTER — PRENATAL OFFICE VISIT (OUTPATIENT)
Dept: OBGYN | Facility: OTHER | Age: 28
End: 2023-01-31
Attending: NURSE PRACTITIONER
Payer: COMMERCIAL

## 2023-01-31 VITALS
HEIGHT: 62 IN | DIASTOLIC BLOOD PRESSURE: 68 MMHG | HEART RATE: 75 BPM | OXYGEN SATURATION: 100 % | WEIGHT: 147.7 LBS | SYSTOLIC BLOOD PRESSURE: 125 MMHG | BODY MASS INDEX: 27.18 KG/M2

## 2023-01-31 DIAGNOSIS — Z12.4 SCREENING FOR CERVICAL CANCER: ICD-10-CM

## 2023-01-31 DIAGNOSIS — Z34.82 NORMAL PREGNANCY IN MULTIGRAVIDA IN SECOND TRIMESTER: ICD-10-CM

## 2023-01-31 LAB
C TRACH DNA SPEC QL PROBE+SIG AMP: NEGATIVE
N GONORRHOEA DNA SPEC QL NAA+PROBE: NEGATIVE

## 2023-01-31 PROCEDURE — G0463 HOSPITAL OUTPT CLINIC VISIT: HCPCS | Mod: 25 | Performed by: COUNSELOR

## 2023-01-31 PROCEDURE — 87491 CHLMYD TRACH DNA AMP PROBE: CPT | Mod: ZL | Performed by: NURSE PRACTITIONER

## 2023-01-31 PROCEDURE — G0123 SCREEN CERV/VAG THIN LAYER: HCPCS | Mod: ZL | Performed by: NURSE PRACTITIONER

## 2023-01-31 PROCEDURE — 36415 COLL VENOUS BLD VENIPUNCTURE: CPT | Mod: ZL | Performed by: NURSE PRACTITIONER

## 2023-01-31 PROCEDURE — 99207 PR PRENATAL VISIT: CPT | Performed by: NURSE PRACTITIONER

## 2023-01-31 PROCEDURE — G0463 HOSPITAL OUTPT CLINIC VISIT: HCPCS | Performed by: COUNSELOR

## 2023-01-31 ASSESSMENT — PAIN SCALES - GENERAL: PAINLEVEL: NO PAIN (0)

## 2023-01-31 ASSESSMENT — PATIENT HEALTH QUESTIONNAIRE - PHQ9: SUM OF ALL RESPONSES TO PHQ QUESTIONS 1-9: 3

## 2023-01-31 NOTE — PROGRESS NOTES
Have you had or do you currently have:    - Diabetes? N  - Hypertension? N  - Heart disease, mitral valve prolapse, or rheumatic fever?  N  - An autoimmune disease such as lupus or rheumatoid arthritis?  N  - Kidney disease, urinary tract infection?  N  - Epilepsy, seizures, or spells?  N  - Migraine headaches?  N  - Any other neurological problems?  N  - Have you ever been treated for depression?  N  - Are you having problems with crying spells or loss of self-esteem?  N  - Have you ever required psychiatric care?  N  - Have you ever had hepatitis, liver disease, or jaundice?  N  - Have you ever been treated for blood clots in your veins, deep vein thrombosis, inflammation in the veins, thrombosis, phelbitis, pulmonary embolism or varicosities?  N  - Have you had excessive bleeding after surgery or dental work?  N  - Do you bleed more than other women after a cut or scratch?  N  - Do you have a history or anemia?  N  - Have you ever had thyroid problems or take thyroid medication?  N  - Do you have any endocrine problems?  N  - Have you every been in a major accident or suffered serious trauma?  N  - Within the last year, has anyone hit, slapped, kicked, or otherwise hurt you?  N  - In the last year, has anyone forced you to have sex when you didn't want to?  N  - Have you every received a blood transfusion?  N  - Would you refuse a blood transfusion if a doctor judged it to be medically necessary?  N  - Does anyone in your home smoke? N  - Do you use tobacco products?  N  - Do you drink beer, wine, or hard liquor?  N  - Do you use any of the following: marijuana, speed, cocaine, heroin, hallucinogens, or other drugs?  N  - Is your blood type RH negative?  Y  - Have you ever had asthma?  Y  - Have you ever had tuberculosis?  N  - Do you have any allergies to drugs or over-the-counter medications?  Y  - Allergies: dust mites, aspartame, ethanol, venlafaxine hydrochloride, sertraline?  N  - Have you had any breast  problems?  N  - Have you ever breast-fed?  Y  - Have you had any gynecological surgical procedures such as cervical conization, LEEP, laser treatment, cryosurgery of the cervix, or a dilatation and curettage, etc?  N  - Have you ever had any other surgical procedures?  N  - Have you ever had any anesthetic complications?  N  - Have you ever had an abnormal pap smear?  N  - Do you have a history of abnormalities of the uterus? N  - Did your mother take SHAUNA or any other hormones when she was pregnant with you?  N  - Did it take you more than one year to become pregnant?  N  - Have you ever been evaluated or treated for infertility?  N  - Is there a history of medical problems in your family, which you feel might adversely affect your health or pregnancy?  N  - Do you have any other problems we have not asked about which you feel may be important to this pregnancy?  N    Symptoms since last menstrual period  - Do you currently have any of the following symptoms: abdominal pain, blood in the stool or urine, chest pain, shortness of breath, coughing or vomiting up blood, you heart is racing or skipping beats, nausea and vomiting, pain on urination, or vaginal discharge or bleeding?  N    Genetic screening  Has the patient, baby's father, or anyone in either family had:  - Thalassemia (Italian, Greek, Mediterranean, or  background only) and an MCV result less than 80?  N  - Neural tube defect such as meningomyelocele, spina bifida, or anencephaly?  N  - Congential heart defect?  N  - Down's syndrome?  N  - Gumaro-Sachs disease ( Alevism, Cajun, Polish-[place])?  N  - Sickle cell disease or trait () ?  N  - Hemophilia or other inherited problems of blood?  N  - Muscular dystrophy?  N  - Cystic fibrosis?  N  - Cullman's chorea?  N  - Mental retardation/autism?  N   If yes, was the person tested for Fragile X?  N  - Any other inherited genetic or chromosomal disorder?  N  - Maternal metabolic disorder (e.g.  insulin- dependent diabetes, PKU)?  N  - A child with birth defects not listed above?  N  - Recurrent pregnancy loss, or a stillbirth?  N  - Has the patient had any medications/street drugs/alcohol since her last menstrual period?  N  - Does the patient or baby's father have any other genetic risk?  Y, BICUSPID VALVE, AORTIC     Infection history  - Have you ever been treated for tuberculosis?  N  - Have you every had a positive skin test for tuberculosis?  N  - Do you live with someone who has tuberculosis?  N  - Have you ever been exposed to tuberculosis?  N  - Do you have genital herpes?  N  - Does your partner have genital herpes?  N  - Have you had a rash or viral illness since your last period?  N  - Have you ever had gonorrhea, chlamydia, syphilis, venereal warts, trichomoniasis, pelvic inflammatory disease, or any other sexually transmitted disease?  N  - Have you had chicken pox?  Y  - Have you been vaccinated against chicken pox?  N  - Have you had any other infectious diseases?  N

## 2023-01-31 NOTE — PROGRESS NOTES
"  HPI:  Alice Kelly is a 27 year old female Patient's last menstrual period was 11/08/2022. at 12w0d, Estimated Date of Delivery: Aug 15, 2023.  She denies vaginal bleeding, nausea , vomiting and abdominal pain. No other c/o.    Past Medical History:   Diagnosis Date     Allergic Rhinitis, Seasonal 4/15/2011     Asthma      Chronic urticaria 4/17/2020     Congenital melanocytic nevus 12/6/2011    Formatting of this note might be different from the original. IMO Update 10/11     Eczema 4/15/2011     Mild intermittent asthma without complication 4/17/2020     Other acne 3/12/2012     Rh negative, antepartum 8/30/2021       Past Surgical History:   Procedure Laterality Date     PE tube placement  1997       Allergies: Sulfa drugs     EXAM:  Blood pressure 125/68, pulse 75, height 1.575 m (5' 2\"), weight 67 kg (147 lb 11.2 oz), last menstrual period 11/08/2022, SpO2 100 %, currently breastfeeding.   BMI= Body mass index is 27.01 kg/m .  General - pleasant female in no acute distress.  Neck - supple without lymphadenopathy or thyromegaly.  Lungs - clear to auscultation bilaterally.  Heart - regular rate and rhythm without murmur.  Abdomen - soft, nontender, nondistended, no hepatosplenomegaly.  Pelvic - EG: normal adult female, BUS: within normal limits, Vagina: well rugated, no discharge, Cervix: no lesions or CMT, closed/long Uterus: gravid, consistant with dates, mobile, Adnexae: no masses or tenderness.  Rectovaginal - deferred.  Musculoskeletal - no gross deformities.  Neurological - normal strength, sensation, and mental status.    Doptones were +    ASSESSMENT/PLAN:  (Z12.4) Screening for cervical cancer  (primary encounter diagnosis)  Comment:   Plan: A pap thin layer screen reflex to HPV if ASCUS         - recommend age 25 - 29            (Z34.82) Normal pregnancy in multigravida in second trimester  Comment: new ob teaching and physical completed  Plan: GC/Chlamydia by PCR - [facility], [facility]         " Non-Invasive Prenatal Screening        Return in 4 weeks.       Weight gain and exercise during pregnancy was discussed at today's visit.  The patient will return to clinic in 4 weeks for continued prenatal care.

## 2023-02-06 LAB
BKR LAB AP GYN ADEQUACY: NORMAL
BKR LAB AP GYN INTERPRETATION: NORMAL
BKR LAB AP HPV REFLEX: NORMAL
BKR LAB AP PREVIOUS ABNORMAL: NORMAL
PATH REPORT.COMMENTS IMP SPEC: NORMAL
PATH REPORT.COMMENTS IMP SPEC: NORMAL
PATH REPORT.RELEVANT HX SPEC: NORMAL
SCANNED LAB RESULT: NORMAL

## 2023-02-28 ENCOUNTER — PRENATAL OFFICE VISIT (OUTPATIENT)
Dept: OBGYN | Facility: OTHER | Age: 28
End: 2023-02-28
Attending: OBSTETRICS & GYNECOLOGY
Payer: COMMERCIAL

## 2023-02-28 VITALS
WEIGHT: 149 LBS | SYSTOLIC BLOOD PRESSURE: 98 MMHG | OXYGEN SATURATION: 99 % | HEART RATE: 85 BPM | BODY MASS INDEX: 27.42 KG/M2 | RESPIRATION RATE: 12 BRPM | HEIGHT: 62 IN | DIASTOLIC BLOOD PRESSURE: 62 MMHG

## 2023-02-28 DIAGNOSIS — Z34.82 NORMAL PREGNANCY IN MULTIGRAVIDA IN SECOND TRIMESTER: Primary | ICD-10-CM

## 2023-02-28 PROCEDURE — 99207 PR PRENATAL VISIT: CPT | Performed by: OBSTETRICS & GYNECOLOGY

## 2023-02-28 PROCEDURE — G0463 HOSPITAL OUTPT CLINIC VISIT: HCPCS | Performed by: OBSTETRICS & GYNECOLOGY

## 2023-02-28 ASSESSMENT — PAIN SCALES - GENERAL: PAINLEVEL: NO PAIN (0)

## 2023-02-28 NOTE — PROGRESS NOTES
Doing well.  No concerns today.  NIPT reviewed, nml  US for full fetal anatomical survey/fetal echo ordered.  Return to clinic in 4 weeks    Yuriy Benitez MD  2/28/2023

## 2023-03-10 ENCOUNTER — TELEPHONE (OUTPATIENT)
Dept: OBGYN | Facility: OTHER | Age: 28
End: 2023-03-10

## 2023-03-10 DIAGNOSIS — J45.20 MILD INTERMITTENT ASTHMA WITHOUT COMPLICATION: Primary | ICD-10-CM

## 2023-03-10 RX ORDER — ALBUTEROL SULFATE 90 UG/1
2 AEROSOL, METERED RESPIRATORY (INHALATION) EVERY 6 HOURS PRN
Qty: 18 G | Refills: 3 | Status: CANCELLED | OUTPATIENT
Start: 2023-03-10

## 2023-03-10 RX ORDER — ALBUTEROL SULFATE 90 UG/1
2 AEROSOL, METERED RESPIRATORY (INHALATION) EVERY 6 HOURS PRN
Qty: 18 G | Refills: 1 | Status: SHIPPED | OUTPATIENT
Start: 2023-03-10

## 2023-03-10 NOTE — TELEPHONE ENCOUNTER
3/10/2023 8:35 AM  Pt called pt reports hx of asthma. Pt reports she is prescribed Symbicort, but reports category C. Pt inquiring if albuterol inhaler is safe to take during pregnancy and are you able to prescribe it. Pt reports in the past her albuterol inhaler has helped her.  She reports she feels her asthma more this pregnancy. Negative COVID test, pt declines needing to be seen. Denies any other sx or concerns.  Routing to Lo Ann to advise. Med pended. Aliza Kumar, RN

## 2023-03-15 ENCOUNTER — HOSPITAL ENCOUNTER (OUTPATIENT)
Dept: ULTRASOUND IMAGING | Facility: HOSPITAL | Age: 28
Discharge: HOME | End: 2023-03-15
Attending: NURSE PRACTITIONER | Admitting: NURSE PRACTITIONER
Payer: COMMERCIAL

## 2023-03-15 DIAGNOSIS — Z34.82 NORMAL PREGNANCY IN MULTIGRAVIDA IN SECOND TRIMESTER: ICD-10-CM

## 2023-03-15 PROCEDURE — 76805 OB US >/= 14 WKS SNGL FETUS: CPT

## 2023-03-28 ENCOUNTER — PRENATAL OFFICE VISIT (OUTPATIENT)
Dept: OBGYN | Facility: OTHER | Age: 28
End: 2023-03-28
Attending: NURSE PRACTITIONER
Payer: COMMERCIAL

## 2023-03-28 VITALS
HEART RATE: 86 BPM | DIASTOLIC BLOOD PRESSURE: 62 MMHG | OXYGEN SATURATION: 98 % | SYSTOLIC BLOOD PRESSURE: 113 MMHG | WEIGHT: 154.3 LBS | HEIGHT: 62 IN | BODY MASS INDEX: 28.39 KG/M2

## 2023-03-28 DIAGNOSIS — Z34.82 NORMAL PREGNANCY IN MULTIGRAVIDA IN SECOND TRIMESTER: Primary | ICD-10-CM

## 2023-03-28 PROCEDURE — 99207 PR PRENATAL VISIT: CPT | Performed by: NURSE PRACTITIONER

## 2023-03-28 PROCEDURE — G0463 HOSPITAL OUTPT CLINIC VISIT: HCPCS | Performed by: COUNSELOR

## 2023-03-28 ASSESSMENT — PAIN SCALES - GENERAL: PAINLEVEL: NO PAIN (0)

## 2023-03-28 NOTE — PROGRESS NOTES
Doing well.  Baby active  No VB or cramping.  Getting regular exercise.  Anatomy screen US reviewed.  Fetal echo is scheduled.  Return in 4 weeks.

## 2023-04-02 ENCOUNTER — HOSPITAL ENCOUNTER (EMERGENCY)
Facility: HOSPITAL | Age: 28
Discharge: HOME | End: 2023-04-02
Attending: PHYSICIAN ASSISTANT | Admitting: PHYSICIAN ASSISTANT
Payer: COMMERCIAL

## 2023-04-02 VITALS
DIASTOLIC BLOOD PRESSURE: 64 MMHG | OXYGEN SATURATION: 100 % | RESPIRATION RATE: 16 BRPM | HEART RATE: 107 BPM | TEMPERATURE: 99.2 F | SYSTOLIC BLOOD PRESSURE: 110 MMHG

## 2023-04-02 DIAGNOSIS — O21.9 NAUSEA AND VOMITING DURING PREGNANCY: ICD-10-CM

## 2023-04-02 LAB
ANION GAP SERPL CALCULATED.3IONS-SCNC: 15 MMOL/L (ref 7–15)
BASOPHILS # BLD AUTO: 0 10E3/UL (ref 0–0.2)
BASOPHILS NFR BLD AUTO: 0 %
BUN SERPL-MCNC: 10.9 MG/DL (ref 6–20)
CALCIUM SERPL-MCNC: 8.9 MG/DL (ref 8.6–10)
CHLORIDE SERPL-SCNC: 102 MMOL/L (ref 98–107)
CREAT SERPL-MCNC: 0.42 MG/DL (ref 0.51–0.95)
DEPRECATED HCO3 PLAS-SCNC: 20 MMOL/L (ref 22–29)
EOSINOPHIL # BLD AUTO: 0 10E3/UL (ref 0–0.7)
EOSINOPHIL NFR BLD AUTO: 0 %
ERYTHROCYTE [DISTWIDTH] IN BLOOD BY AUTOMATED COUNT: 13.7 % (ref 10–15)
GFR SERPL CREATININE-BSD FRML MDRD: >90 ML/MIN/1.73M2
GLUCOSE SERPL-MCNC: 95 MG/DL (ref 70–99)
HCG INTACT+B SERPL-ACNC: ABNORMAL MIU/ML
HCT VFR BLD AUTO: 36 % (ref 35–47)
HGB BLD-MCNC: 13.1 G/DL (ref 11.7–15.7)
HOLD SPECIMEN: NORMAL
IMM GRANULOCYTES # BLD: 0 10E3/UL
IMM GRANULOCYTES NFR BLD: 1 %
LYMPHOCYTES # BLD AUTO: 0.4 10E3/UL (ref 0.8–5.3)
LYMPHOCYTES NFR BLD AUTO: 4 %
MCH RBC QN AUTO: 31.9 PG (ref 26.5–33)
MCHC RBC AUTO-ENTMCNC: 36.4 G/DL (ref 31.5–36.5)
MCV RBC AUTO: 88 FL (ref 78–100)
MONOCYTES # BLD AUTO: 0.4 10E3/UL (ref 0–1.3)
MONOCYTES NFR BLD AUTO: 6 %
NEUTROPHILS # BLD AUTO: 7.2 10E3/UL (ref 1.6–8.3)
NEUTROPHILS NFR BLD AUTO: 89 %
NRBC # BLD AUTO: 0 10E3/UL
NRBC BLD AUTO-RTO: 0 /100
PLATELET # BLD AUTO: 234 10E3/UL (ref 150–450)
POTASSIUM SERPL-SCNC: 3.8 MMOL/L (ref 3.4–5.3)
RBC # BLD AUTO: 4.11 10E6/UL (ref 3.8–5.2)
SODIUM SERPL-SCNC: 137 MMOL/L (ref 136–145)
WBC # BLD AUTO: 8 10E3/UL (ref 4–11)

## 2023-04-02 PROCEDURE — 99284 EMERGENCY DEPT VISIT MOD MDM: CPT | Mod: 25

## 2023-04-02 PROCEDURE — 84702 CHORIONIC GONADOTROPIN TEST: CPT | Performed by: PHYSICIAN ASSISTANT

## 2023-04-02 PROCEDURE — 99284 EMERGENCY DEPT VISIT MOD MDM: CPT | Performed by: PHYSICIAN ASSISTANT

## 2023-04-02 PROCEDURE — 250N000011 HC RX IP 250 OP 636: Performed by: PHYSICIAN ASSISTANT

## 2023-04-02 PROCEDURE — 36415 COLL VENOUS BLD VENIPUNCTURE: CPT | Performed by: PHYSICIAN ASSISTANT

## 2023-04-02 PROCEDURE — 96374 THER/PROPH/DIAG INJ IV PUSH: CPT

## 2023-04-02 PROCEDURE — 80048 BASIC METABOLIC PNL TOTAL CA: CPT | Performed by: PHYSICIAN ASSISTANT

## 2023-04-02 PROCEDURE — 85025 COMPLETE CBC W/AUTO DIFF WBC: CPT | Performed by: PHYSICIAN ASSISTANT

## 2023-04-02 RX ORDER — ONDANSETRON 4 MG/1
4 TABLET, ORALLY DISINTEGRATING ORAL EVERY 8 HOURS PRN
Qty: 10 TABLET | Refills: 0 | Status: ON HOLD | OUTPATIENT
Start: 2023-04-02 | End: 2023-08-19

## 2023-04-02 RX ORDER — ONDANSETRON 2 MG/ML
4 INJECTION INTRAMUSCULAR; INTRAVENOUS ONCE
Status: COMPLETED | OUTPATIENT
Start: 2023-04-02 | End: 2023-04-02

## 2023-04-02 RX ADMIN — ONDANSETRON 4 MG: 2 INJECTION INTRAMUSCULAR; INTRAVENOUS at 16:19

## 2023-04-02 ASSESSMENT — ENCOUNTER SYMPTOMS
COUGH: 0
CHILLS: 0
BLOOD IN STOOL: 0
CONSTIPATION: 1
FLANK PAIN: 0
DYSURIA: 0
CONFUSION: 0
HEADACHES: 0
VOMITING: 1
NAUSEA: 1
FATIGUE: 0
DIARRHEA: 0
FEVER: 0
SHORTNESS OF BREATH: 0
ABDOMINAL PAIN: 1
HEMATURIA: 0
WHEEZING: 0
NECK PAIN: 0
DIZZINESS: 0

## 2023-04-02 ASSESSMENT — ACTIVITIES OF DAILY LIVING (ADL): ADLS_ACUITY_SCORE: 35

## 2023-04-02 NOTE — ED NOTES
"Pt reports that she is starting to get \"tunnel vision.\" Pt was given ice chips and states that she's had two without throwing up.   "

## 2023-04-02 NOTE — ED PROVIDER NOTES
History     Chief Complaint   Patient presents with     Hyperemesis     Abdominal Pain     HPI  Alice Kelly is a 27 year old female who  gestation 20 weeks who presents to the emergency room due to nausea and vomiting with mild abdominal pain.  Onset of symptoms last evening.  She states that her and her family ate pizza, then she started developing symptoms overnight.  All other family members are asymptomatic.  Her current pregnancy she has been dealing with constipation, last BM earlier today formed normal but prior to that would go a few days without any bowel, she is passing gas.  Urinating without issue, denies dysuria or hematuria.  Denies any vaginal discharge, bleeding, spotting.  She is able to feel her fetus moving.    Allergies:  Allergies   Allergen Reactions     Sulfa Drugs Rash     Sulfa (sulfonamide antibiotics)       Problem List:    Patient Active Problem List    Diagnosis Date Noted     Normal pregnancy in multigravida in second trimester 2023     Priority: Medium     AB Neg  echo ped ordered - order/p.w. given to laila on 23  Breastfeeding  Covid Vaccine Completed   Flu Done  Covid Prior to Pregnancy       Advanced directives, counseling/discussion 2021     Priority: Medium     Rh negative, antepartum 2021     Priority: Medium     Chronic urticaria 2020     Priority: Medium     Mild intermittent asthma without complication 2020     Priority: Medium     Other acne 2012     Priority: Medium        Past Medical History:    Past Medical History:   Diagnosis Date     Allergic Rhinitis, Seasonal 4/15/2011     Asthma      Chronic urticaria 2020     Congenital melanocytic nevus 2011     Eczema 4/15/2011     Mild intermittent asthma without complication 2020     Other acne 3/12/2012     Rh negative, antepartum 2021       Past Surgical History:    Past Surgical History:   Procedure Laterality Date     PE tube placement         Family  History:    Family History   Problem Relation Age of Onset     Asthma Mother      Allergies Mother      C.A.D. Maternal Grandmother      Lipids Maternal Grandmother      Other - See Comments Maternal Grandmother      C.A.D. Paternal Grandfather      Allergies Father        Social History:  Marital Status:   [2]  Social History     Tobacco Use     Smoking status: Never     Smokeless tobacco: Never   Substance Use Topics     Alcohol use: Yes     Drug use: Never        Medications:    ondansetron (ZOFRAN ODT) 4 MG ODT tab  albuterol (PROAIR HFA/PROVENTIL HFA/VENTOLIN HFA) 108 (90 Base) MCG/ACT inhaler  budesonide-formoterol (SYMBICORT) 80-4.5 MCG/ACT Inhaler  Cholecalciferol (VITAMIN D-3) 125 MCG (5000 UT) TABS  CRANBERRY PO  Prenatal Vit-Fe Fumarate-FA (PRENATAL MULTIVITAMIN W/IRON) 27-0.8 MG tablet          Review of Systems   Constitutional: Negative for chills, fatigue and fever.   Respiratory: Negative for cough, shortness of breath and wheezing.    Cardiovascular: Negative for chest pain.   Gastrointestinal: Positive for abdominal pain, constipation, nausea and vomiting. Negative for blood in stool and diarrhea.   Genitourinary: Negative for dysuria, flank pain, hematuria, vaginal bleeding and vaginal discharge.   Musculoskeletal: Negative for neck pain.   Neurological: Negative for dizziness and headaches.   Psychiatric/Behavioral: Negative for confusion.       Physical Exam   BP: 111/71  Pulse: 105  Temp: 99.2  F (37.3  C)  Resp: 20  SpO2: 99 %      Physical Exam  Vitals and nursing note reviewed.   Constitutional:       Appearance: Normal appearance. She is not ill-appearing.   HENT:      Head: Normocephalic and atraumatic.      Mouth/Throat:      Mouth: Mucous membranes are moist.   Cardiovascular:      Rate and Rhythm: Normal rate and regular rhythm.      Pulses: Normal pulses.   Pulmonary:      Effort: Pulmonary effort is normal. No respiratory distress.      Breath sounds: Normal breath sounds. No  stridor. No wheezing or rhonchi.   Abdominal:      Tenderness: There is no abdominal tenderness. There is no guarding.      Comments: Gravid uterus    Skin:     General: Skin is warm and dry.   Neurological:      General: No focal deficit present.      Mental Status: She is alert and oriented to person, place, and time.   Psychiatric:         Mood and Affect: Mood normal.         Behavior: Behavior normal.         ED Course         ED Course as of 04/02/23 1833   Sun Apr 02, 2023   1626 Due to nausea, will give Zofran IV and bolus of IV fluids         Results for orders placed or performed during the hospital encounter of 04/02/23 (from the past 24 hour(s))   [facility] Draw    Narrative    The following orders were created for panel order [facility] Draw.  Procedure                               Abnormality         Status                     ---------                               -----------         ------                     Extra Red Top Tube[[id number]]                               Final result               Extra Green Top (Lithium...[[id number]]                      Final result               Extra Purple Top Tube[[id number]]                            Final result               Extra Green Top (Lithium...[[id number]]                      Final result                 Please view results for these tests on the individual orders.   Extra Red Top Tube   Result Value Ref Range    Hold Specimen JIC    Extra Green Top (Lithium Heparin) Tube   Result Value Ref Range    Hold Specimen JIC    Extra Purple Top Tube   Result Value Ref Range    Hold Specimen JIC    Extra Green Top (Lithium Heparin) ON ICE   Result Value Ref Range    Hold Specimen JIC    CBC with platelets differential    Narrative    The following orders were created for panel order CBC with platelets differential.  Procedure                               Abnormality         Status                     ---------                               -----------          ------                     CBC with platelets and d...[[id number]]  Abnormal            Final result                 Please view results for these tests on the individual orders.   Basic metabolic panel   Result Value Ref Range    Sodium 137 136 - 145 mmol/L    Potassium 3.8 3.4 - 5.3 mmol/L    Chloride 102 98 - 107 mmol/L    Carbon Dioxide (CO2) 20 (L) 22 - 29 mmol/L    Anion Gap 15 7 - 15 mmol/L    Urea Nitrogen 10.9 6.0 - 20.0 mg/dL    Creatinine 0.42 (L) 0.51 - 0.95 mg/dL    Calcium 8.9 8.6 - 10.0 mg/dL    Glucose 95 70 - 99 mg/dL    GFR Estimate >90 >60 mL/min/1.73m2   HCG quantitative pregnancy (blood)   Result Value Ref Range    hCG Quantitative 10,929 (H) <5 mIU/mL   CBC with platelets and differential   Result Value Ref Range    WBC Count 8.0 4.0 - 11.0 10e3/uL    RBC Count 4.11 3.80 - 5.20 10e6/uL    Hemoglobin 13.1 11.7 - 15.7 g/dL    Hematocrit 36.0 35.0 - 47.0 %    MCV 88 78 - 100 fL    MCH 31.9 26.5 - 33.0 pg    MCHC 36.4 31.5 - 36.5 g/dL    RDW 13.7 10.0 - 15.0 %    Platelet Count 234 150 - 450 10e3/uL    % Neutrophils 89 %    % Lymphocytes 4 %    % Monocytes 6 %    % Eosinophils 0 %    % Basophils 0 %    % Immature Granulocytes 1 %    NRBCs per 100 WBC 0 <1 /100    Absolute Neutrophils 7.2 1.6 - 8.3 10e3/uL    Absolute Lymphocytes 0.4 (L) 0.8 - 5.3 10e3/uL    Absolute Monocytes 0.4 0.0 - 1.3 10e3/uL    Absolute Eosinophils 0.0 0.0 - 0.7 10e3/uL    Absolute Basophils 0.0 0.0 - 0.2 10e3/uL    Absolute Immature Granulocytes 0.0 <=0.4 10e3/uL    Absolute NRBCs 0.0 10e3/uL       Medications   ondansetron (ZOFRAN) injection 4 mg (4 mg Intravenous $Given 23 1880)       Assessments & Plan (with Medical Decision Making)   Alice is a 27-year-old female who is 20 weeks gestation pregnant -0-0-1 who presents to the emergency room due to nausea/vomiting less than 24 hours.    -Normal benign lab work-up, no leukocytosis, no anemia  -No significant abdominal pain to warrant any further imaging  -No vaginal  bleeding for concern for miscarriage, hCG normal level  -Given Zofran and fluid bolus for nausea/vomiting relief  -We will give Rx Zofran as needed  -Patient will contact her OB/GYN provider for prenatal care early next week and follow-up    Patient understands to return to the ER if she develops significant uncontrolled nausea and vomiting or develops abdominal pain or vaginal bleeding    Disposition: Okay to discharge home    I have reviewed the nursing notes.    I have reviewed the findings, diagnosis, plan and need for follow up with the patient.      Discharge Medication List as of 4/2/2023  6:05 PM      START taking these medications    Details   ondansetron (ZOFRAN ODT) 4 MG ODT tab Take 1 tablet (4 mg) by mouth every 8 hours as needed for nausea, Disp-10 tablet, R-0, [facility]             Final diagnoses:   Nausea and vomiting during pregnancy       4/2/2023   [facility]     Geoffrey Mehta PA-C  04/02/23 7310

## 2023-04-02 NOTE — ED NOTES
Patient presents to emergency room with complaints of vomiting and abdominal cramping. The abdominal cramping is generalized over the entire abdomen. She is currently 20 weeks pregnant. Denies vaginal bleeding or discharge. Denies any recent trauma or injuries. She started vomiting around midnight. She was vomiting every three hours during the night. She reports she has not voided in four hours. She denies dysuria. Denies fever or chills. She is currently constipated. She has had two formed hard BMs since midnight last night. She is feeling fetal movement. No one else is sick at home. Called OB for fetal heart tones.

## 2023-04-02 NOTE — ED TRIAGE NOTES
Pt presents with nausea and vomiting since last night. Pt denies diarrhea. Pt is complaining of abdominal pain and weakness. She reports she is unable to keep anything down. Pt is 20 weeks pregnant and reports that she feels regular movements of the baby. Pt denies any bleeding of spotting. Spoke with Fozia in [facility] who is going to reach out to Dr Gruber about managing patient on OB.

## 2023-04-02 NOTE — DISCHARGE INSTRUCTIONS
Reviewed your labs, benign no concern for renal issues or infectious process  Encourage good hydration to avoid further nausea/vomiting and worsening constipation  Zofran for antinausea given, okay to take every 8 hours as needed    Contact your OB/GYN provider to coordinate a prenatal appointment next week to have further discussion regarding antinausea management and constipation management.    Return to ER if you develop worsening uncontrolled nausea and vomiting, abdominal pain, vaginal bleeding.

## 2023-04-02 NOTE — ED NOTES
Patient reports feeling better after IV zofran. Able to tolerate ice chips and saltine crackers. Updated PA.

## 2023-04-02 NOTE — ED NOTES
Patient given verbal and written discharge instructions. Patient verbalized understanding of discharge instructions. Rx insty med Zofran. Follow up with OB/GYN this week. Pt reports feeling much better.

## 2023-04-04 ENCOUNTER — PRENATAL OFFICE VISIT (OUTPATIENT)
Dept: OBGYN | Facility: OTHER | Age: 28
End: 2023-04-04
Attending: NURSE PRACTITIONER
Payer: COMMERCIAL

## 2023-04-04 VITALS
SYSTOLIC BLOOD PRESSURE: 100 MMHG | BODY MASS INDEX: 27.58 KG/M2 | WEIGHT: 150.8 LBS | OXYGEN SATURATION: 99 % | DIASTOLIC BLOOD PRESSURE: 60 MMHG | HEART RATE: 77 BPM

## 2023-04-04 DIAGNOSIS — Z34.82 NORMAL PREGNANCY IN MULTIGRAVIDA IN SECOND TRIMESTER: Primary | ICD-10-CM

## 2023-04-04 PROCEDURE — 99207 PR PRENATAL VISIT: CPT | Performed by: NURSE PRACTITIONER

## 2023-04-04 PROCEDURE — G0463 HOSPITAL OUTPT CLINIC VISIT: HCPCS

## 2023-04-04 ASSESSMENT — PAIN SCALES - GENERAL: PAINLEVEL: MILD PAIN (2)

## 2023-04-04 NOTE — PROGRESS NOTES
Alice was in the ED this weekend with recurrent vomiting and gastroenteritis.  Denies VB, leaking, or cramping.  Baby active.  Has not stopped vomiting.  Discussed bland diet and rest.  Labs and ED notes reviewed.  Return in 3 weeks as scheduled.

## 2023-04-14 ENCOUNTER — TRANSFERRED RECORDS (OUTPATIENT)
Dept: HEALTH INFORMATION MANAGEMENT | Facility: CLINIC | Age: 28
End: 2023-04-14

## 2023-04-25 ENCOUNTER — PRENATAL OFFICE VISIT (OUTPATIENT)
Dept: OBGYN | Facility: OTHER | Age: 28
End: 2023-04-25
Attending: NURSE PRACTITIONER
Payer: COMMERCIAL

## 2023-04-25 VITALS
DIASTOLIC BLOOD PRESSURE: 68 MMHG | HEIGHT: 62 IN | BODY MASS INDEX: 28.16 KG/M2 | WEIGHT: 153 LBS | SYSTOLIC BLOOD PRESSURE: 112 MMHG

## 2023-04-25 DIAGNOSIS — Z34.80 NORMAL PREGNANCY IN MULTIGRAVIDA: Primary | ICD-10-CM

## 2023-04-25 PROCEDURE — 99207 PR PRENATAL VISIT: CPT | Performed by: NURSE PRACTITIONER

## 2023-04-25 ASSESSMENT — PAIN SCALES - GENERAL: PAINLEVEL: NO PAIN (0)

## 2023-04-25 NOTE — PROGRESS NOTES
Doing well.  Denies concerns.  Baby active.  US and fetal echo reviewed.  Discussed and ordered upcoming labs.  Some concerns with vulvar varicosities; home management discussed.  Mild constipation; taking colace.  Return in 4 weeks.

## 2023-04-29 ENCOUNTER — HEALTH MAINTENANCE LETTER (OUTPATIENT)
Age: 28
End: 2023-04-29

## 2023-05-23 ENCOUNTER — LAB (OUTPATIENT)
Dept: LAB | Facility: OTHER | Age: 28
End: 2023-05-23
Attending: NURSE PRACTITIONER
Payer: COMMERCIAL

## 2023-05-23 ENCOUNTER — PRENATAL OFFICE VISIT (OUTPATIENT)
Dept: OBGYN | Facility: OTHER | Age: 28
End: 2023-05-23
Attending: NURSE PRACTITIONER
Payer: COMMERCIAL

## 2023-05-23 VITALS
HEART RATE: 87 BPM | RESPIRATION RATE: 14 BRPM | WEIGHT: 155.5 LBS | HEIGHT: 62 IN | OXYGEN SATURATION: 95 % | BODY MASS INDEX: 28.61 KG/M2 | DIASTOLIC BLOOD PRESSURE: 62 MMHG | SYSTOLIC BLOOD PRESSURE: 100 MMHG

## 2023-05-23 DIAGNOSIS — Z34.80 NORMAL PREGNANCY IN MULTIGRAVIDA: ICD-10-CM

## 2023-05-23 DIAGNOSIS — Z67.91 RH NEGATIVE STATUS DURING PREGNANCY IN THIRD TRIMESTER: ICD-10-CM

## 2023-05-23 DIAGNOSIS — O26.893 DYSURIA DURING PREGNANCY IN THIRD TRIMESTER: ICD-10-CM

## 2023-05-23 DIAGNOSIS — Z23 NEED FOR VACCINATION: ICD-10-CM

## 2023-05-23 DIAGNOSIS — R30.0 DYSURIA DURING PREGNANCY IN THIRD TRIMESTER: ICD-10-CM

## 2023-05-23 DIAGNOSIS — O26.893 RH NEGATIVE STATUS DURING PREGNANCY IN THIRD TRIMESTER: ICD-10-CM

## 2023-05-23 LAB
ALBUMIN UR-MCNC: NEGATIVE MG/DL
ANTIBODY SCREEN: NEGATIVE
APPEARANCE UR: CLEAR
BILIRUB UR QL STRIP: NEGATIVE
COLOR UR AUTO: NORMAL
ERYTHROCYTE [DISTWIDTH] IN BLOOD BY AUTOMATED COUNT: 13.3 % (ref 10–15)
GLUCOSE 1H P 50 G GLC PO SERPL-MCNC: 84 MG/DL (ref 70–129)
GLUCOSE UR STRIP-MCNC: NEGATIVE MG/DL
HCT VFR BLD AUTO: 40.2 % (ref 35–47)
HGB BLD-MCNC: 13.1 G/DL (ref 11.7–15.7)
HGB UR QL STRIP: NEGATIVE
KETONES UR STRIP-MCNC: NEGATIVE MG/DL
LEUKOCYTE ESTERASE UR QL STRIP: NEGATIVE
MCH RBC QN AUTO: 30.5 PG (ref 26.5–33)
MCHC RBC AUTO-ENTMCNC: 32.6 G/DL (ref 31.5–36.5)
MCV RBC AUTO: 94 FL (ref 78–100)
NITRATE UR QL: NEGATIVE
PH UR STRIP: 7 [PH] (ref 4.7–8)
PLATELET # BLD AUTO: 264 10E3/UL (ref 150–450)
RBC # BLD AUTO: 4.29 10E6/UL (ref 3.8–5.2)
SP GR UR STRIP: 1.01 (ref 1–1.03)
SPECIMEN EXPIRATION DATE: NORMAL
UROBILINOGEN UR STRIP-MCNC: NORMAL MG/DL
WBC # BLD AUTO: 10 10E3/UL (ref 4–11)

## 2023-05-23 PROCEDURE — 99207 PR PRENATAL VISIT: CPT | Performed by: NURSE PRACTITIONER

## 2023-05-23 PROCEDURE — 82950 GLUCOSE TEST: CPT | Mod: ZL

## 2023-05-23 PROCEDURE — 96372 THER/PROPH/DIAG INJ SC/IM: CPT | Performed by: NURSE PRACTITIONER

## 2023-05-23 PROCEDURE — 81003 URINALYSIS AUTO W/O SCOPE: CPT | Mod: ZL | Performed by: NURSE PRACTITIONER

## 2023-05-23 PROCEDURE — 86850 RBC ANTIBODY SCREEN: CPT

## 2023-05-23 PROCEDURE — 86780 TREPONEMA PALLIDUM: CPT | Mod: ZL

## 2023-05-23 PROCEDURE — G0463 HOSPITAL OUTPT CLINIC VISIT: HCPCS

## 2023-05-23 PROCEDURE — 250N000011 HC RX IP 250 OP 636: Performed by: NURSE PRACTITIONER

## 2023-05-23 PROCEDURE — 36415 COLL VENOUS BLD VENIPUNCTURE: CPT | Mod: ZL

## 2023-05-23 PROCEDURE — 85027 COMPLETE CBC AUTOMATED: CPT | Mod: ZL

## 2023-05-23 PROCEDURE — 90471 IMMUNIZATION ADMIN: CPT

## 2023-05-23 RX ADMIN — HUMAN RHO(D) IMMUNE GLOBULIN 300 MCG: 1500 SOLUTION INTRAMUSCULAR; INTRAVENOUS at 10:50

## 2023-05-23 ASSESSMENT — ANXIETY QUESTIONNAIRES
GAD7 TOTAL SCORE: 0
2. NOT BEING ABLE TO STOP OR CONTROL WORRYING: NOT AT ALL
5. BEING SO RESTLESS THAT IT IS HARD TO SIT STILL: NOT AT ALL
6. BECOMING EASILY ANNOYED OR IRRITABLE: NOT AT ALL
7. FEELING AFRAID AS IF SOMETHING AWFUL MIGHT HAPPEN: NOT AT ALL
3. WORRYING TOO MUCH ABOUT DIFFERENT THINGS: NOT AT ALL
1. FEELING NERVOUS, ANXIOUS, OR ON EDGE: NOT AT ALL
GAD7 TOTAL SCORE: 0

## 2023-05-23 ASSESSMENT — PATIENT HEALTH QUESTIONNAIRE - PHQ9
SUM OF ALL RESPONSES TO PHQ QUESTIONS 1-9: 2
5. POOR APPETITE OR OVEREATING: NOT AT ALL

## 2023-05-23 ASSESSMENT — PAIN SCALES - GENERAL: PAINLEVEL: NO PAIN (0)

## 2023-05-23 NOTE — PROGRESS NOTES
Writer reviewed the following education points/handouts during Second Trimester Appointment.   Tdap   Lactation Handout  Fetal Kick Counts  Breast Pump Resources   Circumcision [place] Range Handout  Breastfeeding Booklet: Benefits of Breastfeeding, skin-to-skin and rooming in, types of breast milk, feeding cues, approximate baby's tummy size, breastfeeding positions, signs of a good latch, cluster feeding, sore nipples, engorgement, spouse support.  All questions answered. Writer encouraged patient to call with any concerns or questions in the future.

## 2023-05-23 NOTE — PROGRESS NOTES
Doing well.  Baby active.  Denies cramping or VB.  Varicosities are stable.  28 week labs, Tdap, and rhogam today.  UA for low pelvic pressure and cloudy urine. 28 week teaching today.  Return in 2 weeks.

## 2023-05-24 LAB — T PALLIDUM AB SER QL: NONREACTIVE

## 2023-06-06 ENCOUNTER — PRENATAL OFFICE VISIT (OUTPATIENT)
Dept: OBGYN | Facility: OTHER | Age: 28
End: 2023-06-06
Attending: NURSE PRACTITIONER
Payer: COMMERCIAL

## 2023-06-06 VITALS
HEART RATE: 84 BPM | HEIGHT: 62 IN | OXYGEN SATURATION: 98 % | DIASTOLIC BLOOD PRESSURE: 60 MMHG | BODY MASS INDEX: 28.89 KG/M2 | WEIGHT: 157 LBS | SYSTOLIC BLOOD PRESSURE: 110 MMHG

## 2023-06-06 DIAGNOSIS — Z34.93 NORMAL PREGNANCY, THIRD TRIMESTER: Primary | ICD-10-CM

## 2023-06-06 PROCEDURE — 99207 PR PRENATAL VISIT: CPT | Performed by: NURSE PRACTITIONER

## 2023-06-06 PROCEDURE — G0463 HOSPITAL OUTPT CLINIC VISIT: HCPCS

## 2023-06-06 ASSESSMENT — PAIN SCALES - GENERAL: PAINLEVEL: NO PAIN (0)

## 2023-06-20 ENCOUNTER — PRENATAL OFFICE VISIT (OUTPATIENT)
Dept: OBGYN | Facility: OTHER | Age: 28
End: 2023-06-20
Attending: NURSE PRACTITIONER
Payer: COMMERCIAL

## 2023-06-20 VITALS
WEIGHT: 158.2 LBS | DIASTOLIC BLOOD PRESSURE: 60 MMHG | HEART RATE: 80 BPM | BODY MASS INDEX: 29.11 KG/M2 | OXYGEN SATURATION: 99 % | HEIGHT: 62 IN | SYSTOLIC BLOOD PRESSURE: 115 MMHG | RESPIRATION RATE: 14 BRPM

## 2023-06-20 DIAGNOSIS — Z34.93 NORMAL PREGNANCY, THIRD TRIMESTER: Primary | ICD-10-CM

## 2023-06-20 PROCEDURE — 99207 PR PRENATAL VISIT: CPT | Performed by: OBSTETRICS & GYNECOLOGY

## 2023-06-20 PROCEDURE — G0463 HOSPITAL OUTPT CLINIC VISIT: HCPCS

## 2023-06-20 ASSESSMENT — PAIN SCALES - GENERAL: PAINLEVEL: NO PAIN (0)

## 2023-06-21 NOTE — PROGRESS NOTES
Doing well.  No concerns today.  Discussed kick counts and fetal movement.  RTC in 2 weeks  Denies PTL arnel, julieta, lof  Yuriy Benitez MD  6/21/2023

## 2023-07-07 ENCOUNTER — PRENATAL OFFICE VISIT (OUTPATIENT)
Dept: OBGYN | Facility: OTHER | Age: 28
End: 2023-07-07
Attending: NURSE PRACTITIONER
Payer: COMMERCIAL

## 2023-07-07 VITALS
SYSTOLIC BLOOD PRESSURE: 110 MMHG | HEIGHT: 62 IN | BODY MASS INDEX: 29.44 KG/M2 | OXYGEN SATURATION: 98 % | WEIGHT: 160 LBS | HEART RATE: 88 BPM | DIASTOLIC BLOOD PRESSURE: 62 MMHG

## 2023-07-07 DIAGNOSIS — Z34.93 NORMAL PREGNANCY, THIRD TRIMESTER: Primary | ICD-10-CM

## 2023-07-07 PROCEDURE — G0463 HOSPITAL OUTPT CLINIC VISIT: HCPCS

## 2023-07-07 PROCEDURE — 99207 PR PRENATAL VISIT: CPT | Performed by: NURSE PRACTITIONER

## 2023-07-07 ASSESSMENT — PAIN SCALES - GENERAL: PAINLEVEL: NO PAIN (0)

## 2023-07-07 NOTE — PROGRESS NOTES
Doing well.  Denies concerns.  Baby active. No vb or edgar.  Plan GBS next visit. Return in 2 weeks.

## 2023-07-13 ENCOUNTER — HOSPITAL ENCOUNTER (OUTPATIENT)
Facility: HOSPITAL | Age: 28
Discharge: HOME | End: 2023-07-13
Attending: OBSTETRICS & GYNECOLOGY | Admitting: OBSTETRICS & GYNECOLOGY
Payer: COMMERCIAL

## 2023-07-13 ENCOUNTER — HOSPITAL ENCOUNTER (OUTPATIENT)
Facility: HOSPITAL | Age: 28
End: 2023-07-13
Admitting: OBSTETRICS & GYNECOLOGY
Payer: COMMERCIAL

## 2023-07-13 VITALS
BODY MASS INDEX: 29.08 KG/M2 | WEIGHT: 158 LBS | HEIGHT: 62 IN | TEMPERATURE: 97.9 F | RESPIRATION RATE: 16 BRPM | SYSTOLIC BLOOD PRESSURE: 129 MMHG | DIASTOLIC BLOOD PRESSURE: 72 MMHG | OXYGEN SATURATION: 98 %

## 2023-07-13 PROBLEM — Z36.89 ENCOUNTER FOR TRIAGE IN PREGNANT PATIENT: Status: ACTIVE | Noted: 2023-07-13

## 2023-07-13 LAB
ALBUMIN UR-MCNC: NEGATIVE MG/DL
APPEARANCE UR: CLEAR
BILIRUB UR QL STRIP: NEGATIVE
COLOR UR AUTO: ABNORMAL
GLUCOSE UR STRIP-MCNC: NEGATIVE MG/DL
HGB UR QL STRIP: NEGATIVE
KETONES UR STRIP-MCNC: NEGATIVE MG/DL
LEUKOCYTE ESTERASE UR QL STRIP: NEGATIVE
MUCOUS THREADS #/AREA URNS LPF: PRESENT /LPF
NITRATE UR QL: NEGATIVE
PH UR STRIP: 7 [PH] (ref 4.7–8)
RBC URINE: 0 /HPF
SP GR UR STRIP: 1.01 (ref 1–1.03)
SQUAMOUS EPITHELIAL: 0 /HPF
UROBILINOGEN UR STRIP-MCNC: NORMAL MG/DL
WBC URINE: <1 /HPF

## 2023-07-13 PROCEDURE — 59025 FETAL NON-STRESS TEST: CPT

## 2023-07-13 PROCEDURE — G0463 HOSPITAL OUTPT CLINIC VISIT: HCPCS | Mod: 25

## 2023-07-13 PROCEDURE — 81001 URINALYSIS AUTO W/SCOPE: CPT | Performed by: OBSTETRICS & GYNECOLOGY

## 2023-07-13 RX ORDER — LIDOCAINE 40 MG/G
CREAM TOPICAL
Status: DISCONTINUED | OUTPATIENT
Start: 2023-07-13 | End: 2023-07-13 | Stop reason: HOSPADM

## 2023-07-13 ASSESSMENT — ACTIVITIES OF DAILY LIVING (ADL): ADLS_ACUITY_SCORE: 31

## 2023-07-13 NOTE — DISCHARGE INSTRUCTIONS
Discharge Instructions for Undelivered Patients    Diet:  * Drink 8 to 12 glasses of liquids (milk, juice, water) every day  * You may eat meals and snacks.    Activity:  * Count fetal kicks every day.  * Call your doctor if your baby is moving less than usual.    Call your provider if you notice:  * Swelling in your face or increased swelling in your hands or legs.  * Headaches that are not relieved by Tylenol (acetaminophen).  * Changes in your vision (blurring; seeing spots or stars).  * Nausea (sick to your stomach) and vomiting (throwing up).  * Weight gain of 5 pounds per week.  * Heartburn that doesn't go away.  * Signs of bladder infection: Pain when you urinate (use the toilet), needing to go more often or more urgently.  * The bag of quiroz (membrane) breaks, or you notice leaking in your underwear.  * Bright red blood in your underwear.  * Abdominal (lower belly) or stomach pain.  * Second (plus) baby: Contractions (tightenings) less than 10 minutes apart and getting stronger.  * Increase or change in vaginal discharge (note the color and amount).      [facility]: [phone]

## 2023-07-13 NOTE — PLAN OF CARE
OB Triage Note  Alice Kelly  MRN: [id number]  Gestational Age: 35w2d      Alice Kelly presents for R/O  labor.       States edgar since Tuesday over the last few days.  Rates pain at 0/10.  Bleeding: N/A   Denies LOF.  Denies urinary (UTI) symptoms.    Dr. Benitez notified of arrival and condition.  Oriented patient to surroundings. Call light within reach.       Plan:  -Initial NST, then fetal/uterine monitoring per MD/patient plan.  -Sterile vaginal exam/sterile speculum exam.  -Nursing education on plan provided.      Brittani Sharma RN

## 2023-07-13 NOTE — PLAN OF CARE
Alice Kelly        NST:  Reactive Cat 1  Start: 1100  Stop: 1120    Physician: Dr. Benitez  Reason For Test: R/O  labor  Estimated Date of Delivery: Aug 15, 2023   Gestational Age: 35w2d     Verified with second RN: KORIN Glass and KORIN Stevens RN

## 2023-07-13 NOTE — PLAN OF CARE
"Alice Kelly is a 27 year old  and 35w2d patient came in complaining of Contractions    Patient Active Problem List   Diagnosis     Chronic urticaria     Mild intermittent asthma without complication     Other acne     Rh negative, antepartum     Advanced directives, counseling/discussion     Normal pregnancy in multigravida in second trimester     Encounter for triage in pregnant patient       Nursing education on signs and symptoms of labor and when to call or come in to be seen provided. Self-management instructions reviewed. AVS given and signed. All questions answered and patient verbalizes understanding.    /72 (BP Location: Right arm, Patient Position: Semi-Chávez's, Cuff Size: Adult Regular)   Temp 97.9  F (36.6  C) (Oral)   Resp 16   Ht 1.575 m (5' 2\")   Wt 71.7 kg (158 lb)   LMP 2022   SpO2 98%   BMI 28.90 kg/m      Cervical status: not examined. Dr. Benitez requested a cervical exam be performed. Pt declined. Did not wish to have a SVE.  Fetal Assessment: Reactive    Discharge support:  Family/Friend Support    Pt discharged to home at 1140  and encouraged to rest and drink plenty of fluids.  Pt told to call/return if bleeding more than spotting, water breaks, contractions 3-5 minutes apart that she has to breath through.       OB History    Para Term  AB Living   2 1 1 0 0 1   SAB IAB Ectopic Multiple Live Births   0 0 0 0 1      # Outcome Date GA Lbr Viraj/2nd Weight Sex Delivery Anes PTL Lv   2 Current            1 Term 21 41w0d 03:38 / 01:06 3.53 kg (7 lb 12.5 oz) M Vag-Spont EPI N MELO      Name: STEPHEN,MALE-ALICE      Apgar1: 9  Apgar5: 9       Brittani Sharma RN      "

## 2023-07-18 ENCOUNTER — PRENATAL OFFICE VISIT (OUTPATIENT)
Dept: OBGYN | Facility: OTHER | Age: 28
End: 2023-07-18
Attending: NURSE PRACTITIONER
Payer: COMMERCIAL

## 2023-07-18 VITALS
BODY MASS INDEX: 29.63 KG/M2 | HEART RATE: 82 BPM | SYSTOLIC BLOOD PRESSURE: 120 MMHG | WEIGHT: 161 LBS | HEIGHT: 62 IN | DIASTOLIC BLOOD PRESSURE: 60 MMHG | OXYGEN SATURATION: 99 %

## 2023-07-18 DIAGNOSIS — Z34.93 NORMAL PREGNANCY, THIRD TRIMESTER: Primary | ICD-10-CM

## 2023-07-18 PROCEDURE — 87653 STREP B DNA AMP PROBE: CPT | Mod: ZL | Performed by: NURSE PRACTITIONER

## 2023-07-18 PROCEDURE — 99207 PR PRENATAL VISIT: CPT | Performed by: NURSE PRACTITIONER

## 2023-07-18 PROCEDURE — G0463 HOSPITAL OUTPT CLINIC VISIT: HCPCS

## 2023-07-18 ASSESSMENT — PAIN SCALES - GENERAL: PAINLEVEL: NO PAIN (0)

## 2023-07-18 NOTE — PROGRESS NOTES
Doing well.  Occasional BH but nothing regular.  No vb or leaking.  Baby active. GBS done today.  Signs of labor and when to come in reviewed.  Return in 1 week.

## 2023-07-19 LAB — GP B STREP DNA SPEC QL NAA+PROBE: NEGATIVE

## 2023-07-25 ENCOUNTER — PRENATAL OFFICE VISIT (OUTPATIENT)
Dept: OBGYN | Facility: OTHER | Age: 28
End: 2023-07-25
Attending: NURSE PRACTITIONER
Payer: COMMERCIAL

## 2023-07-25 VITALS
OXYGEN SATURATION: 99 % | HEART RATE: 85 BPM | WEIGHT: 162 LBS | SYSTOLIC BLOOD PRESSURE: 120 MMHG | BODY MASS INDEX: 29.81 KG/M2 | DIASTOLIC BLOOD PRESSURE: 70 MMHG | HEIGHT: 62 IN

## 2023-07-25 DIAGNOSIS — Z34.93 NORMAL PREGNANCY, THIRD TRIMESTER: Primary | ICD-10-CM

## 2023-07-25 PROCEDURE — 99207 PR PRENATAL VISIT: CPT | Performed by: NURSE PRACTITIONER

## 2023-07-25 PROCEDURE — G0463 HOSPITAL OUTPT CLINIC VISIT: HCPCS

## 2023-07-25 ASSESSMENT — PAIN SCALES - GENERAL: PAINLEVEL: NO PAIN (0)

## 2023-07-25 NOTE — PROGRESS NOTES
Doing well.  Denies concerns.  Measuring slightly small for dates; growth US ordered.  Signs of labor reviewed and when to come in.  Return in 1 week.

## 2023-08-01 ENCOUNTER — PRENATAL OFFICE VISIT (OUTPATIENT)
Dept: OBGYN | Facility: OTHER | Age: 28
End: 2023-08-01
Attending: OBSTETRICS & GYNECOLOGY
Payer: COMMERCIAL

## 2023-08-01 VITALS
OXYGEN SATURATION: 99 % | HEART RATE: 97 BPM | DIASTOLIC BLOOD PRESSURE: 62 MMHG | BODY MASS INDEX: 30.73 KG/M2 | WEIGHT: 167 LBS | HEIGHT: 62 IN | SYSTOLIC BLOOD PRESSURE: 104 MMHG

## 2023-08-01 DIAGNOSIS — Z34.93 NORMAL PREGNANCY, THIRD TRIMESTER: Primary | ICD-10-CM

## 2023-08-01 DIAGNOSIS — O26.843 UTERINE SIZE DATE DISCREPANCY, THIRD TRIMESTER: ICD-10-CM

## 2023-08-01 PROCEDURE — 76816 OB US FOLLOW-UP PER FETUS: CPT | Performed by: OBSTETRICS & GYNECOLOGY

## 2023-08-01 PROCEDURE — 99207 PR PRENATAL VISIT: CPT | Performed by: OBSTETRICS & GYNECOLOGY

## 2023-08-01 PROCEDURE — G0463 HOSPITAL OUTPT CLINIC VISIT: HCPCS

## 2023-08-01 ASSESSMENT — PAIN SCALES - GENERAL: PAINLEVEL: NO PAIN (0)

## 2023-08-01 NOTE — PROGRESS NOTES
Doing well.  No concerns today.  Cervix check next visit.  Prenatal flowsheet information is reviewed.  Discussed signs of labor and when to call or come in.  Discussed kick counts and fetal movement.  RTC in 1 week  US for growth/position done. Uterine size less than dates.   EFW 3037 g, cepalic, grossly nml fluid and movement.   Denies regular contractions, vaginal bleeding, ROELF  Yuriy Benitez MD  8/1/2023

## 2023-08-08 ENCOUNTER — PRENATAL OFFICE VISIT (OUTPATIENT)
Dept: OBGYN | Facility: OTHER | Age: 28
End: 2023-08-08
Attending: OBSTETRICS & GYNECOLOGY
Payer: COMMERCIAL

## 2023-08-08 VITALS
BODY MASS INDEX: 30.36 KG/M2 | OXYGEN SATURATION: 98 % | SYSTOLIC BLOOD PRESSURE: 104 MMHG | WEIGHT: 165 LBS | HEIGHT: 62 IN | HEART RATE: 80 BPM | DIASTOLIC BLOOD PRESSURE: 61 MMHG

## 2023-08-08 DIAGNOSIS — Z34.93 NORMAL PREGNANCY, THIRD TRIMESTER: Primary | ICD-10-CM

## 2023-08-08 PROCEDURE — G0463 HOSPITAL OUTPT CLINIC VISIT: HCPCS | Mod: 25

## 2023-08-08 PROCEDURE — 99207 PR PRENATAL VISIT: CPT | Performed by: OBSTETRICS & GYNECOLOGY

## 2023-08-08 ASSESSMENT — PAIN SCALES - GENERAL: PAINLEVEL: NO PAIN (0)

## 2023-08-08 NOTE — PROGRESS NOTES
Doing well.  No concerns today.  Cervix is  not favorable for induction.  Discussed signs of labor and when to call or come in.  Discussed kick counts and fetal movement.  RTC in 1 week.  Recheck cervix at that time.   Denies regular contractions, vaginal bleeding, DARRIUS Benitez MD  8/8/2023

## 2023-08-15 ENCOUNTER — PRENATAL OFFICE VISIT (OUTPATIENT)
Dept: OBGYN | Facility: OTHER | Age: 28
End: 2023-08-15
Attending: OBSTETRICS & GYNECOLOGY
Payer: COMMERCIAL

## 2023-08-15 VITALS
HEART RATE: 85 BPM | BODY MASS INDEX: 30.73 KG/M2 | HEIGHT: 62 IN | WEIGHT: 167 LBS | OXYGEN SATURATION: 98 % | SYSTOLIC BLOOD PRESSURE: 114 MMHG | DIASTOLIC BLOOD PRESSURE: 63 MMHG

## 2023-08-15 DIAGNOSIS — Z34.93 NORMAL PREGNANCY, THIRD TRIMESTER: Primary | ICD-10-CM

## 2023-08-15 PROCEDURE — G0463 HOSPITAL OUTPT CLINIC VISIT: HCPCS | Mod: 25

## 2023-08-15 PROCEDURE — 99207 PR PRENATAL VISIT: CPT | Performed by: OBSTETRICS & GYNECOLOGY

## 2023-08-15 RX ORDER — OXYTOCIN 10 [USP'U]/ML
10 INJECTION, SOLUTION INTRAMUSCULAR; INTRAVENOUS
Status: CANCELLED | OUTPATIENT
Start: 2023-08-15

## 2023-08-15 RX ORDER — ONDANSETRON 4 MG/1
4 TABLET, ORALLY DISINTEGRATING ORAL EVERY 6 HOURS PRN
Status: CANCELLED | OUTPATIENT
Start: 2023-08-15

## 2023-08-15 RX ORDER — METHYLERGONOVINE MALEATE 0.2 MG/ML
200 INJECTION INTRAVENOUS
Status: CANCELLED | OUTPATIENT
Start: 2023-08-15

## 2023-08-15 RX ORDER — CARBOPROST TROMETHAMINE 250 UG/ML
250 INJECTION, SOLUTION INTRAMUSCULAR
Status: CANCELLED | OUTPATIENT
Start: 2023-08-15

## 2023-08-15 RX ORDER — OXYTOCIN/0.9 % SODIUM CHLORIDE 30/500 ML
100-340 PLASTIC BAG, INJECTION (ML) INTRAVENOUS CONTINUOUS PRN
Status: CANCELLED | OUTPATIENT
Start: 2023-08-15

## 2023-08-15 RX ORDER — NALOXONE HYDROCHLORIDE 0.4 MG/ML
0.4 INJECTION, SOLUTION INTRAMUSCULAR; INTRAVENOUS; SUBCUTANEOUS
Status: CANCELLED | OUTPATIENT
Start: 2023-08-15

## 2023-08-15 RX ORDER — PROCHLORPERAZINE 25 MG
25 SUPPOSITORY, RECTAL RECTAL EVERY 12 HOURS PRN
Status: CANCELLED | OUTPATIENT
Start: 2023-08-15

## 2023-08-15 RX ORDER — NALOXONE HYDROCHLORIDE 0.4 MG/ML
0.2 INJECTION, SOLUTION INTRAMUSCULAR; INTRAVENOUS; SUBCUTANEOUS
Status: CANCELLED | OUTPATIENT
Start: 2023-08-15

## 2023-08-15 RX ORDER — ONDANSETRON 2 MG/ML
4 INJECTION INTRAMUSCULAR; INTRAVENOUS EVERY 6 HOURS PRN
Status: CANCELLED | OUTPATIENT
Start: 2023-08-15

## 2023-08-15 RX ORDER — CITRIC ACID/SODIUM CITRATE 334-500MG
30 SOLUTION, ORAL ORAL
Status: CANCELLED | OUTPATIENT
Start: 2023-08-15

## 2023-08-15 RX ORDER — PROCHLORPERAZINE MALEATE 5 MG
10 TABLET ORAL EVERY 6 HOURS PRN
Status: CANCELLED | OUTPATIENT
Start: 2023-08-15

## 2023-08-15 RX ORDER — OXYTOCIN/0.9 % SODIUM CHLORIDE 30/500 ML
340 PLASTIC BAG, INJECTION (ML) INTRAVENOUS CONTINUOUS PRN
Status: CANCELLED | OUTPATIENT
Start: 2023-08-15

## 2023-08-15 RX ORDER — IBUPROFEN 800 MG/1
800 TABLET, FILM COATED ORAL
Status: CANCELLED | OUTPATIENT
Start: 2023-08-15 | End: 2023-08-20

## 2023-08-15 RX ORDER — KETOROLAC TROMETHAMINE 30 MG/ML
30 INJECTION, SOLUTION INTRAMUSCULAR; INTRAVENOUS
Status: CANCELLED | OUTPATIENT
Start: 2023-08-15 | End: 2023-08-20

## 2023-08-15 RX ORDER — TRANEXAMIC ACID 10 MG/ML
1 INJECTION, SOLUTION INTRAVENOUS EVERY 30 MIN PRN
Status: CANCELLED | OUTPATIENT
Start: 2023-08-15

## 2023-08-15 RX ORDER — FENTANYL CITRATE 50 UG/ML
100 INJECTION, SOLUTION INTRAMUSCULAR; INTRAVENOUS
Status: CANCELLED | OUTPATIENT
Start: 2023-08-15

## 2023-08-15 RX ORDER — MISOPROSTOL 200 UG/1
200 TABLET ORAL
Status: CANCELLED | OUTPATIENT
Start: 2023-08-15

## 2023-08-15 RX ORDER — METOCLOPRAMIDE HYDROCHLORIDE 5 MG/ML
10 INJECTION INTRAMUSCULAR; INTRAVENOUS EVERY 6 HOURS PRN
Status: CANCELLED | OUTPATIENT
Start: 2023-08-15

## 2023-08-15 RX ORDER — LIDOCAINE 40 MG/G
CREAM TOPICAL
Status: CANCELLED | OUTPATIENT
Start: 2023-08-15

## 2023-08-15 RX ORDER — METOCLOPRAMIDE 10 MG/1
10 TABLET ORAL EVERY 6 HOURS PRN
Status: CANCELLED | OUTPATIENT
Start: 2023-08-15

## 2023-08-15 RX ORDER — OXYCODONE HYDROCHLORIDE 5 MG/1
5 TABLET ORAL
Status: CANCELLED | OUTPATIENT
Start: 2023-08-15

## 2023-08-15 RX ORDER — ACETAMINOPHEN 325 MG/1
650 TABLET ORAL EVERY 4 HOURS PRN
Status: CANCELLED | OUTPATIENT
Start: 2023-08-15

## 2023-08-15 RX ORDER — MISOPROSTOL 200 UG/1
400 TABLET ORAL
Status: CANCELLED | OUTPATIENT
Start: 2023-08-15

## 2023-08-15 ASSESSMENT — PAIN SCALES - GENERAL: PAINLEVEL: NO PAIN (0)

## 2023-08-15 NOTE — PROGRESS NOTES
Doing well.  No concerns today.  Cervix is more favorable for induction this week which she desires. .Membranes stripped with consent.   Discussed indications, risks, complications and failure rate of inductions.  Elective Cytotec IOL scheduled Thursday (40 2/7)  She will report to OB if contractions every 5-10 minutes or if rupture of membranes.  Denies regular contractions, vaginal bleeding, DARRIUS Benitez MD  8/15/2023

## 2023-08-16 ENCOUNTER — TELEPHONE (OUTPATIENT)
Dept: OBGYN | Facility: HOSPITAL | Age: 28
End: 2023-08-16

## 2023-08-16 DIAGNOSIS — Z34.82 NORMAL PREGNANCY IN MULTIGRAVIDA IN SECOND TRIMESTER: Primary | ICD-10-CM

## 2023-08-17 ENCOUNTER — ANESTHESIA (OUTPATIENT)
Dept: OBGYN | Facility: HOSPITAL | Age: 28
End: 2023-08-17
Payer: COMMERCIAL

## 2023-08-17 ENCOUNTER — ANESTHESIA EVENT (OUTPATIENT)
Dept: OBGYN | Facility: HOSPITAL | Age: 28
End: 2023-08-17
Payer: COMMERCIAL

## 2023-08-17 ENCOUNTER — HOSPITAL ENCOUNTER (INPATIENT)
Facility: HOSPITAL | Age: 28
LOS: 2 days | Discharge: HOME | End: 2023-08-19
Attending: OBSTETRICS & GYNECOLOGY | Admitting: OBSTETRICS & GYNECOLOGY
Payer: COMMERCIAL

## 2023-08-17 DIAGNOSIS — Z34.93 NORMAL PREGNANCY, THIRD TRIMESTER: ICD-10-CM

## 2023-08-17 LAB
ABO/RH(D): ABNORMAL
ANTIBODY ID: NORMAL
ANTIBODY SCREEN: POSITIVE
ERYTHROCYTE [DISTWIDTH] IN BLOOD BY AUTOMATED COUNT: 13.4 % (ref 10–15)
HCT VFR BLD AUTO: 34.6 % (ref 35–47)
HGB BLD-MCNC: 11.8 G/DL (ref 11.7–15.7)
HOLD SPECIMEN: NORMAL
MCH RBC QN AUTO: 30 PG (ref 26.5–33)
MCHC RBC AUTO-ENTMCNC: 34.1 G/DL (ref 31.5–36.5)
MCV RBC AUTO: 88 FL (ref 78–100)
PLATELET # BLD AUTO: 237 10E3/UL (ref 150–450)
RBC # BLD AUTO: 3.93 10E6/UL (ref 3.8–5.2)
SPECIMEN EXPIRATION DATE: ABNORMAL
SPECIMEN EXPIRATION DATE: NORMAL
WBC # BLD AUTO: 8 10E3/UL (ref 4–11)

## 2023-08-17 PROCEDURE — 85027 COMPLETE CBC AUTOMATED: CPT | Performed by: OBSTETRICS & GYNECOLOGY

## 2023-08-17 PROCEDURE — 120N000001 HC R&B MED SURG/OB

## 2023-08-17 PROCEDURE — 3E0R3NZ INTRODUCTION OF ANALGESICS, HYPNOTICS, SEDATIVES INTO SPINAL CANAL, PERCUTANEOUS APPROACH: ICD-10-PCS | Performed by: OBSTETRICS & GYNECOLOGY

## 2023-08-17 PROCEDURE — 10907ZC DRAINAGE OF AMNIOTIC FLUID, THERAPEUTIC FROM PRODUCTS OF CONCEPTION, VIA NATURAL OR ARTIFICIAL OPENING: ICD-10-PCS | Performed by: OBSTETRICS & GYNECOLOGY

## 2023-08-17 PROCEDURE — 86850 RBC ANTIBODY SCREEN: CPT | Performed by: OBSTETRICS & GYNECOLOGY

## 2023-08-17 PROCEDURE — 250N000009 HC RX 250: Performed by: NURSE ANESTHETIST, CERTIFIED REGISTERED

## 2023-08-17 PROCEDURE — 250N000011 HC RX IP 250 OP 636: Performed by: NURSE ANESTHETIST, CERTIFIED REGISTERED

## 2023-08-17 PROCEDURE — 258N000003 HC RX IP 258 OP 636: Performed by: OBSTETRICS & GYNECOLOGY

## 2023-08-17 PROCEDURE — 250N000013 HC RX MED GY IP 250 OP 250 PS 637: Performed by: OBSTETRICS & GYNECOLOGY

## 2023-08-17 PROCEDURE — 722N000001 HC LABOR CARE VAGINAL DELIVERY SINGLE

## 2023-08-17 PROCEDURE — 59400 OBSTETRICAL CARE: CPT | Performed by: OBSTETRICS & GYNECOLOGY

## 2023-08-17 PROCEDURE — 86870 RBC ANTIBODY IDENTIFICATION: CPT | Performed by: OBSTETRICS & GYNECOLOGY

## 2023-08-17 PROCEDURE — 250N000009 HC RX 250: Performed by: OBSTETRICS & GYNECOLOGY

## 2023-08-17 PROCEDURE — 36415 COLL VENOUS BLD VENIPUNCTURE: CPT | Performed by: OBSTETRICS & GYNECOLOGY

## 2023-08-17 PROCEDURE — 59400 OBSTETRICAL CARE: CPT | Performed by: NURSE ANESTHETIST, CERTIFIED REGISTERED

## 2023-08-17 PROCEDURE — 0HQ9XZZ REPAIR PERINEUM SKIN, EXTERNAL APPROACH: ICD-10-PCS | Performed by: OBSTETRICS & GYNECOLOGY

## 2023-08-17 PROCEDURE — 86901 BLOOD TYPING SEROLOGIC RH(D): CPT | Performed by: OBSTETRICS & GYNECOLOGY

## 2023-08-17 PROCEDURE — 86780 TREPONEMA PALLIDUM: CPT | Performed by: OBSTETRICS & GYNECOLOGY

## 2023-08-17 RX ORDER — CARBOPROST TROMETHAMINE 250 UG/ML
250 INJECTION, SOLUTION INTRAMUSCULAR
Status: DISCONTINUED | OUTPATIENT
Start: 2023-08-17 | End: 2023-08-17

## 2023-08-17 RX ORDER — VITAMIN A ACETATE, .BETA.-CAROTENE, ASCORBIC ACID, CHOLECALCIFEROL, .ALPHA.-TOCOPHEROL ACETATE, DL-, THIAMINE MONONITRATE, RIBOFLAVIN, NIACINAMIDE, PYRIDOXINE HYDROCHLORIDE, FOLIC ACID, CYANOCOBALAMIN, CALCIUM CARBONATE, FERROUS FUMARATE, ZINC OXIDE, AND CUPRIC OXIDE 2000; 2000; 120; 400; 22; 1.84; 3; 20; 10; 1; 12; 200; 27; 25; 2 [IU]/1; [IU]/1; MG/1; [IU]/1; MG/1; MG/1; MG/1; MG/1; MG/1; MG/1; UG/1; MG/1; MG/1; MG/1; MG/1
1 TABLET ORAL DAILY
Status: DISCONTINUED | OUTPATIENT
Start: 2023-08-17 | End: 2023-08-19 | Stop reason: HOSPADM

## 2023-08-17 RX ORDER — KETOROLAC TROMETHAMINE 30 MG/ML
30 INJECTION, SOLUTION INTRAMUSCULAR; INTRAVENOUS
Status: DISCONTINUED | OUTPATIENT
Start: 2023-08-17 | End: 2023-08-17

## 2023-08-17 RX ORDER — IBUPROFEN 800 MG/1
800 TABLET, FILM COATED ORAL EVERY 6 HOURS PRN
Status: DISCONTINUED | OUTPATIENT
Start: 2023-08-17 | End: 2023-08-19 | Stop reason: HOSPADM

## 2023-08-17 RX ORDER — CITRIC ACID/SODIUM CITRATE 334-500MG
30 SOLUTION, ORAL ORAL
Status: DISCONTINUED | OUTPATIENT
Start: 2023-08-17 | End: 2023-08-17

## 2023-08-17 RX ORDER — OXYCODONE HYDROCHLORIDE 5 MG/1
5 TABLET ORAL
Status: DISCONTINUED | OUTPATIENT
Start: 2023-08-17 | End: 2023-08-17

## 2023-08-17 RX ORDER — OXYTOCIN/0.9 % SODIUM CHLORIDE 30/500 ML
1-24 PLASTIC BAG, INJECTION (ML) INTRAVENOUS CONTINUOUS
Status: DISCONTINUED | OUTPATIENT
Start: 2023-08-17 | End: 2023-08-17

## 2023-08-17 RX ORDER — NALOXONE HYDROCHLORIDE 0.4 MG/ML
0.4 INJECTION, SOLUTION INTRAMUSCULAR; INTRAVENOUS; SUBCUTANEOUS
Status: DISCONTINUED | OUTPATIENT
Start: 2023-08-17 | End: 2023-08-19 | Stop reason: HOSPADM

## 2023-08-17 RX ORDER — ONDANSETRON 4 MG/1
4 TABLET, ORALLY DISINTEGRATING ORAL EVERY 6 HOURS PRN
Status: DISCONTINUED | OUTPATIENT
Start: 2023-08-17 | End: 2023-08-17

## 2023-08-17 RX ORDER — SODIUM CHLORIDE, SODIUM LACTATE, POTASSIUM CHLORIDE, CALCIUM CHLORIDE 600; 310; 30; 20 MG/100ML; MG/100ML; MG/100ML; MG/100ML
INJECTION, SOLUTION INTRAVENOUS CONTINUOUS PRN
Status: DISCONTINUED | OUTPATIENT
Start: 2023-08-17 | End: 2023-08-17

## 2023-08-17 RX ORDER — OXYTOCIN/0.9 % SODIUM CHLORIDE 30/500 ML
340 PLASTIC BAG, INJECTION (ML) INTRAVENOUS CONTINUOUS PRN
Status: DISCONTINUED | OUTPATIENT
Start: 2023-08-17 | End: 2023-08-19 | Stop reason: HOSPADM

## 2023-08-17 RX ORDER — FENTANYL CITRATE 50 UG/ML
100 INJECTION, SOLUTION INTRAMUSCULAR; INTRAVENOUS
Status: DISCONTINUED | OUTPATIENT
Start: 2023-08-17 | End: 2023-08-17

## 2023-08-17 RX ORDER — PROCHLORPERAZINE 25 MG
25 SUPPOSITORY, RECTAL RECTAL EVERY 12 HOURS PRN
Status: DISCONTINUED | OUTPATIENT
Start: 2023-08-17 | End: 2023-08-17

## 2023-08-17 RX ORDER — METHYLERGONOVINE MALEATE 0.2 MG/ML
200 INJECTION INTRAVENOUS
Status: DISCONTINUED | OUTPATIENT
Start: 2023-08-17 | End: 2023-08-19 | Stop reason: HOSPADM

## 2023-08-17 RX ORDER — HYDROCORTISONE 25 MG/G
CREAM TOPICAL 3 TIMES DAILY PRN
Status: DISCONTINUED | OUTPATIENT
Start: 2023-08-17 | End: 2023-08-19 | Stop reason: HOSPADM

## 2023-08-17 RX ORDER — NALOXONE HYDROCHLORIDE 0.4 MG/ML
0.2 INJECTION, SOLUTION INTRAMUSCULAR; INTRAVENOUS; SUBCUTANEOUS
Status: DISCONTINUED | OUTPATIENT
Start: 2023-08-17 | End: 2023-08-17

## 2023-08-17 RX ORDER — DOCUSATE SODIUM 100 MG/1
100 CAPSULE, LIQUID FILLED ORAL DAILY
Status: DISCONTINUED | OUTPATIENT
Start: 2023-08-17 | End: 2023-08-19 | Stop reason: HOSPADM

## 2023-08-17 RX ORDER — ONDANSETRON 2 MG/ML
4 INJECTION INTRAMUSCULAR; INTRAVENOUS EVERY 6 HOURS PRN
Status: DISCONTINUED | OUTPATIENT
Start: 2023-08-17 | End: 2023-08-17

## 2023-08-17 RX ORDER — BISACODYL 10 MG
10 SUPPOSITORY, RECTAL RECTAL DAILY PRN
Status: DISCONTINUED | OUTPATIENT
Start: 2023-08-17 | End: 2023-08-19 | Stop reason: HOSPADM

## 2023-08-17 RX ORDER — ACETAMINOPHEN 325 MG/1
650 TABLET ORAL EVERY 4 HOURS PRN
Status: DISCONTINUED | OUTPATIENT
Start: 2023-08-17 | End: 2023-08-17

## 2023-08-17 RX ORDER — OXYTOCIN/0.9 % SODIUM CHLORIDE 30/500 ML
340 PLASTIC BAG, INJECTION (ML) INTRAVENOUS CONTINUOUS PRN
Status: COMPLETED | OUTPATIENT
Start: 2023-08-17 | End: 2023-08-17

## 2023-08-17 RX ORDER — ACETAMINOPHEN 325 MG/1
650 TABLET ORAL EVERY 4 HOURS PRN
Status: DISCONTINUED | OUTPATIENT
Start: 2023-08-17 | End: 2023-08-19 | Stop reason: HOSPADM

## 2023-08-17 RX ORDER — NALBUPHINE HYDROCHLORIDE 10 MG/ML
2.5-5 INJECTION, SOLUTION INTRAMUSCULAR; INTRAVENOUS; SUBCUTANEOUS EVERY 6 HOURS PRN
Status: DISCONTINUED | OUTPATIENT
Start: 2023-08-17 | End: 2023-08-17

## 2023-08-17 RX ORDER — OXYCODONE HYDROCHLORIDE 5 MG/1
5 TABLET ORAL EVERY 4 HOURS PRN
Status: DISCONTINUED | OUTPATIENT
Start: 2023-08-17 | End: 2023-08-19 | Stop reason: HOSPADM

## 2023-08-17 RX ORDER — EPHEDRINE SULFATE 50 MG/ML
5 INJECTION, SOLUTION INTRAMUSCULAR; INTRAVENOUS; SUBCUTANEOUS
Status: DISCONTINUED | OUTPATIENT
Start: 2023-08-17 | End: 2023-08-17

## 2023-08-17 RX ORDER — IBUPROFEN 800 MG/1
800 TABLET, FILM COATED ORAL
Status: DISCONTINUED | OUTPATIENT
Start: 2023-08-17 | End: 2023-08-17

## 2023-08-17 RX ORDER — MISOPROSTOL 200 UG/1
400 TABLET ORAL
Status: DISCONTINUED | OUTPATIENT
Start: 2023-08-17 | End: 2023-08-19 | Stop reason: HOSPADM

## 2023-08-17 RX ORDER — METOCLOPRAMIDE HYDROCHLORIDE 5 MG/ML
10 INJECTION INTRAMUSCULAR; INTRAVENOUS EVERY 6 HOURS PRN
Status: DISCONTINUED | OUTPATIENT
Start: 2023-08-17 | End: 2023-08-17

## 2023-08-17 RX ORDER — OXYTOCIN 10 [USP'U]/ML
10 INJECTION, SOLUTION INTRAMUSCULAR; INTRAVENOUS
Status: DISCONTINUED | OUTPATIENT
Start: 2023-08-17 | End: 2023-08-17

## 2023-08-17 RX ORDER — NALOXONE HYDROCHLORIDE 0.4 MG/ML
0.4 INJECTION, SOLUTION INTRAMUSCULAR; INTRAVENOUS; SUBCUTANEOUS
Status: DISCONTINUED | OUTPATIENT
Start: 2023-08-17 | End: 2023-08-17

## 2023-08-17 RX ORDER — LIDOCAINE HYDROCHLORIDE 10 MG/ML
INJECTION, SOLUTION EPIDURAL; INFILTRATION; INTRACAUDAL; PERINEURAL PRN
Status: DISCONTINUED | OUTPATIENT
Start: 2023-08-17 | End: 2023-08-17

## 2023-08-17 RX ORDER — PROCHLORPERAZINE MALEATE 10 MG
10 TABLET ORAL EVERY 6 HOURS PRN
Status: DISCONTINUED | OUTPATIENT
Start: 2023-08-17 | End: 2023-08-17

## 2023-08-17 RX ORDER — MODIFIED LANOLIN
OINTMENT (GRAM) TOPICAL
Status: DISCONTINUED | OUTPATIENT
Start: 2023-08-17 | End: 2023-08-19 | Stop reason: HOSPADM

## 2023-08-17 RX ORDER — NALOXONE HYDROCHLORIDE 0.4 MG/ML
0.2 INJECTION, SOLUTION INTRAMUSCULAR; INTRAVENOUS; SUBCUTANEOUS
Status: DISCONTINUED | OUTPATIENT
Start: 2023-08-17 | End: 2023-08-19 | Stop reason: HOSPADM

## 2023-08-17 RX ORDER — TRANEXAMIC ACID 10 MG/ML
1 INJECTION, SOLUTION INTRAVENOUS EVERY 30 MIN PRN
Status: DISCONTINUED | OUTPATIENT
Start: 2023-08-17 | End: 2023-08-19 | Stop reason: HOSPADM

## 2023-08-17 RX ORDER — MISOPROSTOL 200 UG/1
400 TABLET ORAL
Status: DISCONTINUED | OUTPATIENT
Start: 2023-08-17 | End: 2023-08-17

## 2023-08-17 RX ORDER — TRANEXAMIC ACID 10 MG/ML
1 INJECTION, SOLUTION INTRAVENOUS EVERY 30 MIN PRN
Status: DISCONTINUED | OUTPATIENT
Start: 2023-08-17 | End: 2023-08-17

## 2023-08-17 RX ORDER — CARBOPROST TROMETHAMINE 250 UG/ML
250 INJECTION, SOLUTION INTRAMUSCULAR
Status: DISCONTINUED | OUTPATIENT
Start: 2023-08-17 | End: 2023-08-19 | Stop reason: HOSPADM

## 2023-08-17 RX ORDER — METHYLERGONOVINE MALEATE 0.2 MG/ML
200 INJECTION INTRAVENOUS
Status: DISCONTINUED | OUTPATIENT
Start: 2023-08-17 | End: 2023-08-17

## 2023-08-17 RX ORDER — LIDOCAINE 40 MG/G
CREAM TOPICAL
Status: DISCONTINUED | OUTPATIENT
Start: 2023-08-17 | End: 2023-08-17

## 2023-08-17 RX ORDER — OXYTOCIN 10 [USP'U]/ML
10 INJECTION, SOLUTION INTRAMUSCULAR; INTRAVENOUS
Status: DISCONTINUED | OUTPATIENT
Start: 2023-08-17 | End: 2023-08-19 | Stop reason: HOSPADM

## 2023-08-17 RX ORDER — LIDOCAINE HYDROCHLORIDE AND EPINEPHRINE 15; 5 MG/ML; UG/ML
INJECTION, SOLUTION EPIDURAL PRN
Status: DISCONTINUED | OUTPATIENT
Start: 2023-08-17 | End: 2023-08-17

## 2023-08-17 RX ORDER — OXYTOCIN/0.9 % SODIUM CHLORIDE 30/500 ML
100-340 PLASTIC BAG, INJECTION (ML) INTRAVENOUS CONTINUOUS PRN
Status: DISCONTINUED | OUTPATIENT
Start: 2023-08-17 | End: 2023-08-17

## 2023-08-17 RX ORDER — METOCLOPRAMIDE 10 MG/1
10 TABLET ORAL EVERY 6 HOURS PRN
Status: DISCONTINUED | OUTPATIENT
Start: 2023-08-17 | End: 2023-08-17

## 2023-08-17 RX ADMIN — LIDOCAINE HYDROCHLORIDE 3 ML: 10 INJECTION, SOLUTION EPIDURAL; INFILTRATION; INTRACAUDAL; PERINEURAL at 17:55

## 2023-08-17 RX ADMIN — IBUPROFEN 800 MG: 800 TABLET, FILM COATED ORAL at 20:49

## 2023-08-17 RX ADMIN — MISOPROSTOL 20 MCG: 200 TABLET ORAL at 08:46

## 2023-08-17 RX ADMIN — Medication 340 ML/HR: at 20:07

## 2023-08-17 RX ADMIN — SODIUM CHLORIDE, POTASSIUM CHLORIDE, SODIUM LACTATE AND CALCIUM CHLORIDE: 600; 310; 30; 20 INJECTION, SOLUTION INTRAVENOUS at 19:22

## 2023-08-17 RX ADMIN — Medication 10 ML/HR: at 18:02

## 2023-08-17 RX ADMIN — MISOPROSTOL 20 MCG: 200 TABLET ORAL at 09:59

## 2023-08-17 RX ADMIN — Medication 2 ML: at 17:55

## 2023-08-17 RX ADMIN — SODIUM CHLORIDE, POTASSIUM CHLORIDE, SODIUM LACTATE AND CALCIUM CHLORIDE 1000 ML: 600; 310; 30; 20 INJECTION, SOLUTION INTRAVENOUS at 15:50

## 2023-08-17 RX ADMIN — MISOPROSTOL 40 MCG: 200 TABLET ORAL at 13:16

## 2023-08-17 RX ADMIN — Medication 3 ML: at 17:52

## 2023-08-17 RX ADMIN — MISOPROSTOL 20 MCG: 200 TABLET ORAL at 12:05

## 2023-08-17 ASSESSMENT — ACTIVITIES OF DAILY LIVING (ADL)
CONCENTRATING,_REMEMBERING_OR_MAKING_DECISIONS_DIFFICULTY: NO
ADLS_ACUITY_SCORE: 20
DIFFICULTY_EATING/SWALLOWING: NO
ADLS_ACUITY_SCORE: 20
DOING_ERRANDS_INDEPENDENTLY_DIFFICULTY: NO
ADLS_ACUITY_SCORE: 20
ADLS_ACUITY_SCORE: 20
WEAR_GLASSES_OR_BLIND: YES
ADLS_ACUITY_SCORE: 20
WALKING_OR_CLIMBING_STAIRS_DIFFICULTY: NO
ADLS_ACUITY_SCORE: 20
CHANGE_IN_FUNCTIONAL_STATUS_SINCE_ONSET_OF_CURRENT_ILLNESS/INJURY: NO
ADLS_ACUITY_SCORE: 20
DRESSING/BATHING_DIFFICULTY: NO
ADLS_ACUITY_SCORE: 20
VISION_MANAGEMENT: WEARS CONTACTS
TOILETING_ISSUES: NO
FALL_HISTORY_WITHIN_LAST_SIX_MONTHS: NO

## 2023-08-17 NOTE — ANESTHESIA PREPROCEDURE EVALUATION
Anesthesia Pre-Procedure Evaluation    Patient: Alice Kelly   MRN: [id number] : 1995        Procedure :           Past Medical History:   Diagnosis Date     Allergic Rhinitis, Seasonal 4/15/2011     Asthma      Chronic urticaria 2020     Congenital melanocytic nevus 2011    Formatting of this note might be different from the original. IMO Update 10/11     Eczema 4/15/2011     Mild intermittent asthma without complication 2020     Other acne 3/12/2012     Rh negative, antepartum 2021      Past Surgical History:   Procedure Laterality Date     PE tube placement        Allergies   Allergen Reactions     Sulfa Antibiotics Rash     Sulfa (sulfonamide antibiotics)      Social History     Tobacco Use     Smoking status: Never     Smokeless tobacco: Never   Substance Use Topics     Alcohol use: Yes      Wt Readings from Last 1 Encounters:   23 74.8 kg (165 lb)        Anesthesia Evaluation   Pt has had prior anesthetic. Type: OB Labor Epidural.    History of anesthetic complications (hypotension with epidural, otherwise successful therapy)       ROS/MED HX  ENT/Pulmonary:     (+)                     asthma                  Neurologic:  - neg neurologic ROS     Cardiovascular:  - neg cardiovascular ROS     METS/Exercise Tolerance:     Hematologic:  - neg hematologic  ROS     Musculoskeletal:       GI/Hepatic:  - neg GI/hepatic ROS     Renal/Genitourinary:       Endo:  - neg endo ROS     Psychiatric/Substance Use:  - neg psychiatric ROS     Infectious Disease:       Malignancy:       Other:             OUTSIDE LABS:  CBC:   Lab Results   Component Value Date    WBC 8.0 2023    WBC 10.0 2023    HGB 11.8 2023    HGB 13.1 2023    HCT 34.6 (L) 2023    HCT 40.2 2023     2023     2023     BMP:   Lab Results   Component Value Date     2023     2020    POTASSIUM 3.8 2023    POTASSIUM 4.3 2020     CHLORIDE 102 04/02/2023    CHLORIDE 107 01/21/2020    CO2 20 (L) 04/02/2023    CO2 25 01/21/2020    BUN 10.9 04/02/2023    BUN 11 01/21/2020    CR 0.42 (L) 04/02/2023    CR 0.67 01/21/2020    GLC 95 04/02/2023    GLC 82 01/21/2020     COAGS: No results found for: PTT, INR, FIBR  POC: No results found for: BGM, HCG, HCGS  HEPATIC:   Lab Results   Component Value Date    ALBUMIN 3.8 01/21/2020    PROTTOTAL 7.5 01/21/2020    ALT 28 01/21/2020    AST 16 01/21/2020    ALKPHOS 49 01/21/2020    BILITOTAL 0.3 01/21/2020     OTHER:   Lab Results   Component Value Date    HELENA 8.9 04/02/2023    TSH 3.53 01/21/2020       Anesthesia Plan    ASA Status:  2       Anesthesia Type: Epidural.              Consents    Anesthesia Plan(s) and associated risks, benefits, and realistic alternatives discussed. Questions answered and patient/representative(s) expressed understanding.     - Discussed:     - Discussed with:  Patient            Postoperative Care            Comments:    Other Comments: Discussed risks and benefits with patient including itching, sore back, infection, hematoma, spinal headache, CV complications, inability to place, nerve damage. Pt wishes to proceed.        neg OB ROS.     SARAHI Melendez CRNA

## 2023-08-17 NOTE — PLAN OF CARE
Labor Shift Note  Data: See Flowsheets activity for contraction and fetal documentation.   Vitals:    23 1511 23 1607 23 1705 23 1812   BP: 113/66      BP Location: Left arm      Patient Position: Semi-Chávez's      Cuff Size: Adult Regular      Pulse: 81      Resp:       Temp: 98.3  F (36.8  C) 97.6  F (36.4  C) 98  F (36.7  C) 98  F (36.7  C)   TempSrc: Oral Oral Oral Oral   SpO2: 99%      Weight:       Height:       Signs and symptoms of infection:Absent.  Support person Krishna, , present.  Interventions: Continue uterine/fetal assessment per orders and nursing discretion. Vital Signs per order set. Comfort measures/pain control/labor management: Frequent position changes to facilitate labor with epidural anesthesia.  Plan: Anticipate . Provide labor/coping assistance as needed by patient and support person.  Observe for and notify care provider of indications of progressing labor, need for pain medications, or signs of fetal/maternal compromise.    Pt requested epidural 1555. Epidural placed at 1751. Pt indicates pain is well managed. IUPC placed at 1827. RN oncoming aware of the need to start Pitocin.    Face to face report given with opportunity to observe patient.    Report given to KORIN Lynne RN   2023  7:25 PM

## 2023-08-17 NOTE — H&P
[facility] Labor and Delivery History and Physical    Alice Mitchell MRN# [id number]   Age: 28 year old YOB: 1995     Date of Admission:  2023    Primary care provider: Shauna Covarrubias           Chief Complaint:   Alice Mitchell is a 28 year old female who is 40w2d pregnant and being admitted for induction of labor, indication elective. Prenatal record/H and P reviewed with patient and unchanged.          Pregnancy history:     OBSTETRIC HISTORY:    OB History    Para Term  AB Living   2 1 1 0 0 1   SAB IAB Ectopic Multiple Live Births   0 0 0 0 1      # Outcome Date GA Lbr Viraj/2nd Weight Sex Delivery Anes PTL Lv   2 Current            1 Term 21 41w0d 03:38 / 01:06 3.53 kg (7 lb 12.5 oz) M Vag-Spont EPI N MELO      Name: ADAM MITCHELL      Apgar1: 9  Apgar5: 9       EDC: Estimated Date of Delivery: Aug 15, 2023    Prenatal Labs:   Lab Results   Component Value Date    ABO AB 2021    RH Neg 2021    AS Negative 2023    HEPBANG Nonreactive 2023    CHPCRT Negative 2021    GCPCRT Negative 2021    HGB 11.8 2023       GBS Status:   No results found for: GBS    Active Problem List  Patient Active Problem List   Diagnosis    Chronic urticaria    Mild intermittent asthma without complication    Other acne    Rh negative, antepartum    Advanced directives, counseling/discussion    Normal pregnancy in multigravida in second trimester    Encounter for triage in pregnant patient    Normal labor and delivery       Medication Prior to Admission  Medications Prior to Admission   Medication Sig Dispense Refill Last Dose    albuterol (PROAIR HFA/PROVENTIL HFA/VENTOLIN HFA) 108 (90 Base) MCG/ACT inhaler Inhale 2 puffs into the lungs every 6 hours as needed for shortness of breath, wheezing or cough 18 g 1 More than a month    budesonide-formoterol (SYMBICORT) 80-4.5 MCG/ACT Inhaler Inhale 2 puffs into the lungs every 4 hours as needed (cough,  "shortness of breath, wheezing) 1 Inhaler 1 More than a month    ondansetron (ZOFRAN ODT) 4 MG ODT tab Take 1 tablet (4 mg) by mouth every 8 hours as needed for nausea 10 tablet 0 Past Month    Prenatal Vit-Fe Fumarate-FA (PRENATAL MULTIVITAMIN W/IRON) 27-0.8 MG tablet Take 1 tablet by mouth daily   8/16/2023 at 2100   .        Maternal Past Medical History:     Past Medical History:   Diagnosis Date    Allergic Rhinitis, Seasonal 4/15/2011    Asthma     Chronic urticaria 4/17/2020    Congenital melanocytic nevus 12/6/2011    Formatting of this note might be different from the original. IMO Update 10/11    Eczema 4/15/2011    Mild intermittent asthma without complication 4/17/2020    Other acne 3/12/2012    Rh negative, antepartum 8/30/2021                       Family History:   Unchanged from prenatal record            Social History:   Unchanged from prenatal record         Review of Systems:   Per HPI.  Other systems reviewed and negative         Physical Exam:     Vitals:    08/17/23 0732   BP: 122/72   BP Location: Left arm   Patient Position: Semi-Chávez's   Cuff Size: Adult Regular   Resp: 16   Temp: 98.5  F (36.9  C)   TempSrc: Oral   SpO2: 97%   Weight: 74.8 kg (165 lb)   Height: 1.575 m (5' 2\")     Chest: CTA  CV:  RRR without murmers  General:  Alert and oriented  Abdomen soft, non-tender, gravid  Ext: neg  Cervix: Per RN   Membranes: intact   Dilation: 1-2   Effacement: 40%   Station:-2   Consistency: average   Position: Mid  Presentation:Cephalic  Fetal Heart Rate Tracing: reactive and reassuring  Tocometer: external monitor and inadequate                       Assessment:   Alice Kelly is a 40w2d pregnant female admitted with induction of labor, indication elective.          Plan:   Admit, Routine intrapartum care and monitoring per protocol.  Cytotec IOL. Patient is aware of the risks of an induction including medications used, risk, benefits and alternatives.  She is willing to take these risks and " would like to proceed.        Yuriy Benitez MD

## 2023-08-17 NOTE — ANESTHESIA PROCEDURE NOTES
"Epidural catheter Procedure Note    Pre-Procedure   Staff -        CRNA: Herbie Cifuentes APRN CRNA       Performed By: CRNA       Location: OB       Procedure Start/Stop Times: 8/17/2023 5:45 PM and 8/17/2023 6:05 PM       Pre-Anesthestic Checklist: patient identified, IV checked, risks and benefits discussed, informed consent, monitors and equipment checked, pre-op evaluation, at physician/surgeon's request and post-op pain management  Timeout:       Correct Patient: Yes        Correct Procedure: Yes        Correct Site: Yes        Correct Position: Yes   Procedure Documentation  Procedure: epidural catheter       Diagnosis: labor epidural       Patient Position: sitting       Skin prep: Chloraprep       Local skin infiltrated with 1 mL of 1% lidocaine.        Insertion Site: L3-4. (midline approach).       Technique: LORT air        SAÚL at 4 cm.       Needle Type: TouSharey needle       Needle Gauge: 18.        Needle Length (Inches): 3.5        Catheter: 20 G.          Catheter threaded easily.             # of attempts: 1 and  # of redirects:  1    Assessment/Narrative         Paresthesias: Yes and Resolved.       Test dose of mL at.         Test dose negative, 3 minutes after injection, for signs of intravascular, subdural, or intrathecal injection.       Insertion/Infusion Method: LORT air       Aspiration negative for Heme or CSF via Epidural Catheter.    Medication(s) Administered   Medication Administration Time: 8/17/2023 5:45 PM     Comments:  Prior to intervention, pt rated pain 8 to 10 out of 10 with contractions. After bolus, pt states she feels no pain on the left side with contractions and left leg feels heavy and numb. She reports lower pain scores on right side and rates it 5 out of 10 with contractions. She states her right leg is \"somewhat heavy.\" Pt positioned right tilt and clinician bolus given on the pump. Hemodynamically stable. Pt oriented to demand bolus button.      FOR [facility] " "Bank) ONLY:   Pain Team Contact information: please page the Pain Team Via [facility]. Search \"Pain\". During daytime hours, please page the attending first. At night please page the resident first.      "

## 2023-08-17 NOTE — PROGRESS NOTES
(S) Now comfortable with Epidural  (O)  Vitals:    08/17/23 1511 08/17/23 1607 08/17/23 1705 08/17/23 1812   BP: 113/66      BP Location: Left arm      Patient Position: Semi-Chávez's      Cuff Size: Adult Regular      Pulse: 81      Resp:       Temp: 98.3  F (36.8  C) 97.6  F (36.4  C) 98  F (36.7  C) 98  F (36.7  C)   TempSrc: Oral Oral Oral Oral   SpO2: 99%      Weight:       Height:         Cervix:   Membranes: AROM  clear amniotic fluid   Dilation: 5   Effacement: 90%   Station:-1    Fetal Heart Rate Tracing: reactive and reassuring  Cat:1          Tocometer: external monitor and frequency q 3-4 minutes  IUPC placed.    (A/P)  Arrest of dilatation.  IUPC place.  Monitor for adequate labor.  Pitocin augmentation, R/B discussed. Pt agrees with POC.

## 2023-08-17 NOTE — PLAN OF CARE
Induction of Labor Admit Note  Alice Kelly  MRN: [id number]  Gestational Age: 40w2d      Alice Kelly presents for induction of labor for elective induction.  Patient denies contractions, bleeding or ROM.    Past Medical History:   Diagnosis Date    Allergic Rhinitis, Seasonal 4/15/2011    Asthma     Chronic urticaria 2020    Congenital melanocytic nevus 2011    Formatting of this note might be different from the original. IMO Update 10/11    Eczema 4/15/2011    Mild intermittent asthma without complication 2020    Other acne 3/12/2012    Rh negative, antepartum 2021     OB Induction Plan: Completed and available in epic chart.    Dr. Benitez notified of patient's arrival and condition.      Patient is alert and oriented X 3, denies any pain. pain. Patient oriented to room, unit, hourly rounding, and plan of care. Call light within reach.  Explained admission packet with patient bill of rights brochure. Will continue to monitor and document as needed.     Inpatient nursing criteria listed below was met:    Health care directives status obtained and documented: Yes  Patient identifies a surrogate decision maker: Yes   If yes, who:Krishna () Contact Information:See Flow sheet  Vaccine assessment done and vaccines ordered if appropriate. Yes  Clergy visit ordered if patient requests: N/A  Skin issues/needs documented:N/A  Isolation needs addressed, if appropriate: N/A  Care Plan initiated: Yes  Education Documented (Reminder to educate patient if MRSA is present on admission): Yes  Education Assessment documented:Yes  Patient has discharge needs (If yes, please explain): {YES / NO:[id number]    Plan:   Nurse perform cervical check; initiate Cytotec

## 2023-08-18 LAB
HGB BLD-MCNC: 11.8 G/DL (ref 11.7–15.7)
T PALLIDUM AB SER QL: NONREACTIVE

## 2023-08-18 PROCEDURE — 85018 HEMOGLOBIN: CPT | Performed by: OBSTETRICS & GYNECOLOGY

## 2023-08-18 PROCEDURE — 250N000013 HC RX MED GY IP 250 OP 250 PS 637: Performed by: OBSTETRICS & GYNECOLOGY

## 2023-08-18 PROCEDURE — 250N000011 HC RX IP 250 OP 636: Performed by: OBSTETRICS & GYNECOLOGY

## 2023-08-18 PROCEDURE — 36415 COLL VENOUS BLD VENIPUNCTURE: CPT | Performed by: OBSTETRICS & GYNECOLOGY

## 2023-08-18 PROCEDURE — 120N000001 HC R&B MED SURG/OB

## 2023-08-18 RX ORDER — CALCIUM CARBONATE 500 MG/1
1000 TABLET, CHEWABLE ORAL EVERY 6 HOURS PRN
Status: DISCONTINUED | OUTPATIENT
Start: 2023-08-18 | End: 2023-08-19 | Stop reason: HOSPADM

## 2023-08-18 RX ORDER — ONDANSETRON 4 MG/1
4 TABLET, ORALLY DISINTEGRATING ORAL EVERY 6 HOURS PRN
Status: DISCONTINUED | OUTPATIENT
Start: 2023-08-18 | End: 2023-08-19 | Stop reason: HOSPADM

## 2023-08-18 RX ADMIN — IBUPROFEN 800 MG: 800 TABLET, FILM COATED ORAL at 12:10

## 2023-08-18 RX ADMIN — DOCUSATE SODIUM 100 MG: 100 CAPSULE, LIQUID FILLED ORAL at 09:08

## 2023-08-18 RX ADMIN — ONDANSETRON 4 MG: 4 TABLET, ORALLY DISINTEGRATING ORAL at 00:37

## 2023-08-18 RX ADMIN — PRENATAL VITAMINS-IRON FUMARATE 27 MG IRON-FOLIC ACID 0.8 MG TABLET 1 TABLET: at 09:08

## 2023-08-18 RX ADMIN — ACETAMINOPHEN 650 MG: 325 TABLET, FILM COATED ORAL at 12:11

## 2023-08-18 RX ADMIN — ACETAMINOPHEN 650 MG: 325 TABLET, FILM COATED ORAL at 06:03

## 2023-08-18 RX ADMIN — IBUPROFEN 800 MG: 800 TABLET, FILM COATED ORAL at 06:03

## 2023-08-18 RX ADMIN — CALCIUM CARBONATE 1000 MG: 500 TABLET, CHEWABLE ORAL at 00:37

## 2023-08-18 RX ADMIN — IBUPROFEN 800 MG: 800 TABLET, FILM COATED ORAL at 19:22

## 2023-08-18 ASSESSMENT — ACTIVITIES OF DAILY LIVING (ADL)
ADLS_ACUITY_SCORE: 20

## 2023-08-18 NOTE — PLAN OF CARE
Assessments completed as charted. B/P: 116/63, T: 98.2, P: 81, R: 16. Rates pain: 2/10, cramping. Voiding without difficulty. Fundus: Midline . Lochia: Light. Activity: unrestricted with out pain  and normal activity. Infant feeding: Breast feeding going well.           Postpartum breastfeeding assessment completed and education provided, see Patient Education Activity.  Items included in the education are:   proper positioning and latch  effectiveness of feeding  manual expression  handling and storing breastmilk  maintenance of breastfeeding for the first 6 months  sign/symptoms of infant feeding issues requiring referral to qualified health care provider  Postpartum care education provided, see Patient Education activity. Patient denies needs. Will monitor.  Isabela Li RN

## 2023-08-18 NOTE — PLAN OF CARE
Vaginal Delivery Note   of viable Female.  Nursery RN Vonda MCCOY RN present.  Infant with spontaneous cry, to mother's abdomen, dried and stimulated. Martita cares provided.  Mother and baby in stable condition. Baby skin-to-skin with mom. ID bands placed on infant, mom and FOB. Fundus firm at midline, 2 below umbilicus. Out of bed at 2200, able to void small amount, will attempt again later.   Isabela Li RN on 2023 at 10:46 PM

## 2023-08-18 NOTE — L&D DELIVERY NOTE
Delivery Summary    Alice Kelly MRN# [id number]   Age: 28 year old YOB: 1995     ASSESSMENT & PLAN:       Girl  1st degree     Robin, Female-Alice [[id number]]      Labor Event Times      Latent labor onset date/time: 2023 1254    Dilation complete date: 23 Complete time:  7:56 PM   Start pushing date/time: 2023 2000          Labor Events     labor?: No   steroids: None  Labor Type: Induction/Cervical ripening  Predominate monitoring during 1st stage: intermittent auscultation, continuous electronic fetal monitoring     Antibiotics received during labor?: No       Rupture date/time: 23 1135   Rupture type: Spontaneous Rupture of Membranes  Fluid color: Clear  Fluid odor: Normal     Induction: Misoprostol  Induction date/time: 23 0846    Cervical ripening date/time:           Augmentation: None       Delivery/Placenta Date and Time      Delivery Date: 23 Delivery Time:  8:04 PM   Placenta Date/Time: 2023  8:07 PM  Oxytocin given at the time of delivery: after delivery of baby  Delivering clinician: Yuriy Benitez MD   Other personnel present at delivery:  Provider Role   Isabela Li RN Delivery Nurse   Vonda Townsend RN              Vaginal Counts       Initial count performed by 2 team members:  Two Team Members   Brittani Sharma, LORENZA Perez         Needles Suture Needles Sponges (RETIRED) Instruments   Initial counts 1 0 15    Added to count 1 1     Relief counts       Final counts               Placed during labor Accounted for at the end of labor   FSE     IUPC Yes Yes   Cervidil                               Apgars    Living status: Living   1 Minute 5 Minute 10 Minute 15 Minute 20 Minute   Skin color: 1  1       Heart rate: 2  2       Reflex irritability: 2  2       Muscle tone: 1  2       Respiratory effort: 2  2       Total: 8  9       Apgars assigned by: WARD RN       Cord      Vessels: 3 Vessels                Cord  Blood Disposition: Lab    Gases Sent?: No    Delayed cord clamping?: Yes    Cord Clamping Delay (seconds):  seconds    Stem cell collection?: No           [place] Resuscitation    Methods: None  [place] Care at Delivery: Vaginal Delivery Note   of viable Female.  Nursery RN present.  Infant with spontaneous cry, to mother's abdomen, dried and stimulated. HR always above 100. Mother and baby in stable condition. Baby skin-to-skin with mom. ID bands placed on infant, mom and dad.    Output in Delivery Room: Stool       [place] Measurements    Output in delivery room: Stool       Skin to Skin and Feeding Plan      Skin to skin initiation date/time: 1841    Skin to skin with: Mother  Skin to skin end date/time:     Breastfeeding initiated date/time: 2023       Labor Events and Shoulder Dystocia    Fetal Tracing Prior to Delivery: Category 1, Category 2  Shoulder dystocia present?: Neg       Delivery (Maternal) (Provider to Complete) ([id number])    Episiotomy: None  Perineal lacerations: 1st Repaired?: Yes   Repair suture: 3-0 Rapide  Genital tract inspection done: Pos       Blood Loss  Mother: Alice Kelly #[id number]     Start of Mother's Information      Delivery Blood Loss  23 0804 - 23 2143      Delivery QBL (mL) Hospital Encounter 450 mL    Total  450 mL               End of Mother's Information  Mother: Alice Kelly #[id number]                Delivery - Provider to Complete ([id number])    Delivering clinician: Yuriy Benitez MD  Delivery Type (Choose the 1 that will go to the Birth History): Vaginal, Spontaneous                         Other personnel:  Provider Role   Isabela Li RN Delivery Nurse   Vonda Townsend RN                     Placenta    Date/Time: 2023  8:07 PM  Removal: Spontaneous  Disposition: Hospital disposal             Anesthesia    Method: Epidural                    Presentation and Position    Presentation: Vertex     Occiput Anterior                      Yuriy Benitez MD

## 2023-08-18 NOTE — LACTATION NOTE
INPATIENT LACTATION CONSULT    Alice Kelly                                                                             [id number]    Consultation Date: 2023    Reason for Lactation Referral:routine lactation assessment    Baby's :23    Baby's Current Age:1 d  Baby's Gestational Age:40w 2d    Provider: Dr. Benitez/Dr. Coyne      Maternal History  Maternal History:none  Breast Surgery:No  Breast Changes During Pregnancy:Yes  Breast Feeding History:Yes,  successful, Length of Time: 14 months  Delivery: with no complications  Maternal Medications: Albuterol, Symbicort, Zofran, PNV    Maternal Assessment  Breast Size: large  Nipple Appearance - Left: intact  Nipple Appearance - Right: intact  Nipple Erectility - Left: erect with stimulation  Nipple Erectility - Right: erect with stimulation  Areolas Compressibility: soft and pliable  Nipple Size: average  Milk Supply: colostrum    Infant Assessment  Birth Weight: 7 pounds 3.2 ounces  Mouth: normal  Palate: normal  Jaw: normal  Tongue: normal  Frenulum: normal  Digital Suck Exam: not assessed    Feeding  Feeding Time: 1045   Duration: R Breast 10 min / L Breast 5 min  Effort to Latch:awake and alert, latched easily  Position: R Breast cross cradle / L Breast football  Devices:none    LATCH Score:  Latch:1 - Repeated Attempts  Audible Swallowin - Few  Type of Nipple:2 - Everted  Comfort:2 - Soft, Nontender  Hold:2 - No Assist  Total LATCH Score:8/10    Education  Following education covered with mom. States understanding and denies any questions or concerns.    exclusivity explained and encouraged to establish breastfeeding and order in milk   rooming-in encouraged with explanation of benefits  encourage skin to skin with explanation of benefits  expressed breast milk and lanolin to nipples for healing and comfort as needed  feeding positions  obtaining a deep latch  how to properly unlatching babe  hand  "expression  handling and storage of expressed milk  frequency of feedings (8-12 times in 24 hr period)  how to tell babe is getting enough  feeing cues  burping techniques  anticipatory guidance for \"baby's second night\"    Feeding Plan  Continue to feed on demand when  elicits feeding cues with deep latch. Strive for 8-12 feeding sessions in a 24hr period. Will attempt to do as many feeding sessions skin to skin as possible. Follow-up support provided and OP lactation appt scheduled.      Hilda Mason RN, RNC-OB, IBCLC  Lactation Consultant  [facility]  "

## 2023-08-18 NOTE — PROGRESS NOTES
Medical record reviewed.  No apparent needs noted at this time. Care Transitions will remain available if needs arise. CAMRYN Bradshaw @[phone] August 18, 2023

## 2023-08-18 NOTE — ANESTHESIA POSTPROCEDURE EVALUATION
Patient: Alice Kelly    Procedure: * No procedures listed *       Anesthesia Type:  Epidural    Note:  Disposition: Outpatient   Postop Pain Control: Uneventful            Sign Out: Well controlled pain   PONV: No   Neuro/Psych: Uneventful            Sign Out: Acceptable/Baseline neuro status   Airway/Respiratory: Uneventful            Sign Out: Acceptable/Baseline resp. status   CV/Hemodynamics: Uneventful            Sign Out: Acceptable CV status; No obvious hypovolemia; No obvious fluid overload   Other NRE: NONE   DID A NON-ROUTINE EVENT OCCUR? No       Last vitals:  Vitals:    08/18/23 0009 08/18/23 0642 08/18/23 0730   BP: 116/63 114/55 105/55   Pulse:      Resp:   16   Temp:   98.4  F (36.9  C)   SpO2:   97%       Electronically Signed By: SARAHI Melendez CRNA  August 18, 2023  10:17 AM

## 2023-08-18 NOTE — PROGRESS NOTES
"Name: Alice Kelly  Age: 28 year old  YOB: 1995   Medical Record #: [id number]     Postpartum Day 1: Vaginal delivery    DATE: 2023  SUBJECTIVE:  Patient is doing well. Her pain is well controlled with current medications. She reports normal lochia. She is tolerating a regular diet without nausea. She is ambulating and voiding. She is passing flatus. The patient denies depression symptoms. She denies headache or vision changes, shortness of breath, or chest pain. She denies fever or chills. The baby is well. She is nursing.    OBJECTIVE:  /55 (Cuff Size: Adult Regular)   Pulse 81   Temp 98.4  F (36.9  C) (Oral)   Resp 16   Ht 1.575 m (5' 2\")   Wt 74.8 kg (165 lb)   LMP 2022   SpO2 97%   Breastfeeding Unknown   BMI 30.18 kg/m      Well developed and well nourished female in no apparent distress. Alert and oriented. Lungs are clear to auscultation bilaterally. Heart is regular rate and rhythm. Abdomen is nondistended with positive bowel sounds. Uterine fundus is firm and palpated below the umbilicus. Perineum is clean, dry and intact. Extremities no edema, non-tender.    LABS :  Lab Results   Component Value Date    ABO AB 2021    RH Neg 2021    AS Positive (A) 2023    HEPBANG Nonreactive 2023    TREPT Nonreactive 2023    RUQIGG Positive 2023    TSH 3.53 2020    HGB 11.8 2023    HCT 34.6 (L) 2023     2023    GBPCRT Negative 2023    PAP NIL 10/08/2020    CHPCRT Negative 2021    GCPCRT Negative 2021     CBC Results  Recent Labs   Lab Test 23  0633 23  0759   WBC  --  8.0   RBC  --  3.93   HGB 11.8 11.8   HCT  --  34.6*   MCV  --  88   PLT  --  237        IMPRESSION :  1. 28 year old  female at 40w2d, now delivered.  2. PPD# 1 s/p Spontaneous vaginal delivery, doing well.  3. Rh negative  4. Nursing    PLAN:  1. Continue routine post-partum care.  2.The patient is Rh negative " and infant is Rh negative. Therefore Rhogam is not indicated per protocol.  3. Encourage nursing.  4. Reviewed some of the home instructions in anticipation of discharge; these include being aware of warning signs of postpartum depression, symptoms of infection, avoiding constipation, iron replacement, and the need to seek medical evaluation for any questions or concerns.  5. Options for birth control have been reviewed and a final decision and prescription for that will occur in the postpartum clinic visit.  6. Repeat Serology / Treponema Pallidum Antibody Test obtained per [facility] protocol.  7. Anticipate discharge on PPD# 2 in stable condition.     Fabio Gruber MD  Obstetrics and Gynecology

## 2023-08-18 NOTE — PLAN OF CARE
Assessments completed as charted. B/P: 105/55, T: 98.4, P: 81, R: 16. Rates pain: 0/10 . Voiding without difficulty. Fundus: Midline @U-2. Lochia: Light. Activity: normal activity. Infant feeding: Breast feeding going well.     Postpartum breastfeeding assessment completed and education provided, see Patient Education Activity.  Items included in the education are:   proper positioning and latch  effectiveness of feeding  manual expression  handling and storing breastmilk  maintenance of breastfeeding for the first 6 months  sign/symptoms of infant feeding issues requiring referral to qualified health care provider  Postpartum care education provided, see Patient Education activity. Patient denies needs. Will monitor.  Rosie Jackson RN

## 2023-08-18 NOTE — PLAN OF CARE
Assessments completed as charted. B/P: 116/55, T: 98.4, P: 81, R: 16. Rates pain: 1/10 intermittent uterine cramping. Voiding without difficulty. Fundus: Midline @U-2. Lochia: Light. Activity: normal activity. Infant feeding: Breast feeding going well.     Postpartum breastfeeding assessment completed and education provided, see Patient Education Activity.  Items included in the education are:   proper positioning and latch  effectiveness of feeding  manual expression  handling and storing breastmilk  maintenance of breastfeeding for the first 6 months  sign/symptoms of infant feeding issues requiring referral to qualified health care provider  Postpartum care education provided, see Patient Education activity. Patient denies needs. Will monitor.  Rosie Jackson RN

## 2023-08-19 ENCOUNTER — MEDICAL CORRESPONDENCE (OUTPATIENT)
Dept: HEALTH INFORMATION MANAGEMENT | Facility: HOSPITAL | Age: 28
End: 2023-08-19

## 2023-08-19 VITALS
SYSTOLIC BLOOD PRESSURE: 109 MMHG | HEART RATE: 81 BPM | DIASTOLIC BLOOD PRESSURE: 55 MMHG | RESPIRATION RATE: 16 BRPM | HEIGHT: 62 IN | WEIGHT: 165 LBS | TEMPERATURE: 98 F | BODY MASS INDEX: 30.36 KG/M2 | OXYGEN SATURATION: 98 %

## 2023-08-19 PROCEDURE — 250N000013 HC RX MED GY IP 250 OP 250 PS 637: Performed by: OBSTETRICS & GYNECOLOGY

## 2023-08-19 RX ORDER — DOCUSATE SODIUM 100 MG/1
100 CAPSULE, LIQUID FILLED ORAL DAILY
Qty: 30 CAPSULE | Refills: 0 | Status: SHIPPED | OUTPATIENT
Start: 2023-08-19 | End: 2023-10-03

## 2023-08-19 RX ORDER — ACETAMINOPHEN 325 MG/1
650 TABLET ORAL EVERY 4 HOURS PRN
Qty: 100 TABLET | Refills: 0 | Status: SHIPPED | OUTPATIENT
Start: 2023-08-19

## 2023-08-19 RX ORDER — IBUPROFEN 800 MG/1
800 TABLET, FILM COATED ORAL EVERY 6 HOURS PRN
Qty: 100 TABLET | Refills: 0 | Status: SHIPPED | OUTPATIENT
Start: 2023-08-19

## 2023-08-19 RX ADMIN — DOCUSATE SODIUM 100 MG: 100 CAPSULE, LIQUID FILLED ORAL at 09:02

## 2023-08-19 RX ADMIN — IBUPROFEN 800 MG: 800 TABLET, FILM COATED ORAL at 05:34

## 2023-08-19 RX ADMIN — PRENATAL VITAMINS-IRON FUMARATE 27 MG IRON-FOLIC ACID 0.8 MG TABLET 1 TABLET: at 09:02

## 2023-08-19 ASSESSMENT — ACTIVITIES OF DAILY LIVING (ADL)
ADLS_ACUITY_SCORE: 20

## 2023-08-19 NOTE — PLAN OF CARE
Patient discharged at 10:25 AM via ambulation accompanied by spouse and daughter and staff. Prescriptions - None ordered for discharge. All belongings sent with patient.     Discharge instructions reviewed with patient. Listed belongings gathered and returned to patient.     Patient discharged to home.     Did patient receive discharge instruction on wound care and recognition of infection symptoms? Yes    MISC  Follow up appointment made:  Yes  Home and hospital aquired medications returned to patient: N/A  Patient reports pain was well managed at discharge: Yes

## 2023-08-19 NOTE — PLAN OF CARE
Assessments completed as charted. B/P: 103/57, T: 98.4, P: 81, R: 16. Rates pain: 0/10. Voiding without difficulty. Fundus: Midline firm without massage, 2cm below umbilicus. Lochia: Light. Activity: unrestricted with out pain . Infant feeding: Breast feeding going well.     Postpartum breastfeeding assessment completed and education provided, see Patient Education Activity.  Items included in the education are:   proper positioning and latch  effectiveness of feeding  manual expression  handling and storing breastmilk  maintenance of breastfeeding for the first 6 months  sign/symptoms of infant feeding issues requiring referral to qualified health care provider  Postpartum care education provided, see Patient Education activity. Patient denies needs. Will monitor.  Amita Stover RN

## 2023-08-19 NOTE — PLAN OF CARE
Face to face report given with opportunity to observe patient.    Report given to Shantal Jackson RN   8/18/2023  7:17 PM

## 2023-08-19 NOTE — DISCHARGE INSTRUCTIONS
Post-Partum Discharge Instructions Vaginal delivery    Discharge Diet: Regular diet   Discharge Activity: Increase activity as tolerated. No vigorous activity or exercise for 2 weeks.    Pelvic rest for 6 weeks which includes intercourse, douching and tampons.   Discharge Nursing: Nursing is encouraged.    Schedule outpatient lactation follow up in 2-4 days.   Discharge Instructions: Seek immediate medical evaluation and assistance if you develop any warning signs of postpartum depression.    Avoid constipation and straining. Use stool softeners and fiber to avoid constipation.    Call the OB/GYN Clinic Nurse at [phone] for problems such as fever (temperature >100.4), chills, pain not controlled by usual oral pain medications, persistent nausea and vomiting, constipation, difficulty nursing, heavy odorous vaginal discharge, burning or pain associated with urination.  Call for excessive vaginal bleeding, saturating more than one pad an hour for more than three consecutive hours.  Call for any problems with the incision, drainage or redness from incision site or increasing pain.   Discharge Follow-up: Follow up for outpatient lactation appointment in 2-4 days if desired.    Follow up for postpartum exam in 2 weeks with Dr. Benitez or Lo Ann NP.    Follow up for postpartum exam in 6 weeks with Dr. Benitez.    Follow up with Primary Care Provider as scheduled for management of active medical problems and for adult preventive services.    Call Dr. Gruber at the OB/GYN Clinic at [phone] as necessary if you have problems in the interim.

## 2023-08-19 NOTE — PROGRESS NOTES
"Name: Alice Kelly  Age: 28 year old  YOB: 1995   Medical Record #: [id number]     Postpartum Day 2: Vaginal delivery    DATE: 2023  SUBJECTIVE:  Patient is doing well. Her pain is well controlled with current medications. She reports normal lochia. She is tolerating a regular diet without nausea. She is ambulating and voiding. The patient denies depression symptoms. She denies headache, vision changes, shortness of breath, or chest pain. She denies fever or chills. The baby is well. She is nursing. She desires discharge.    OBJECTIVE:  /55 (Cuff Size: Adult Regular)   Pulse 81   Temp 98  F (36.7  C) (Oral)   Resp 16   Ht 1.575 m (5' 2\")   Wt 74.8 kg (165 lb)   LMP 2022   SpO2 98%   Breastfeeding Unknown   BMI 30.18 kg/m      Well developed and well nourished female in no apparent distress. Alert and oriented. Lungs are clear to auscultation bilaterally. Heart is regular rate and rhythm. Abdomen is nondistended with positive bowel sounds. Uterine fundus is firm and palpated below the umbilicus. Perineum is clean, dry and intact. Extremities no edema, non-tender.    IMPRESSION :  1. 28 year old  female at 40w2d, now delivered.  2. PPD# 2 s/p Spontaneous vaginal delivery, doing well.  3. Rh negative  4. Nursing    PLAN:  1. Continue routine post-partum care. Rhogam was not given because the patient is Rh negative and infant is Rh negative.  2. Encourage nursing.  3. Reviewed home instructions in anticipation of discharge; these include being aware of warning signs of postpartum depression, symptoms of infection, avoiding constipation, iron replacement, and the need to seek medical evaluation for any questions or concerns.  4. Options for birth control have been reviewed and a final decision and prescription for that will occur in the postpartum clinic visit.  5. Repeat Serology / Treponema Pallidum Antibody Test obtained per [facility] protocol.  6. " Anticipate discharge today in stable condition.     Fabio Gruber MD  Obstetrics and Gynecology

## 2023-08-19 NOTE — DISCHARGE SUMMARY
"OB VAGINAL BIRTH DISCHARGE SUMMARY    Name: Alice Kelly  Age: 28 year old  YOB: 1995   Medical Record #: [id number]     Date of Admission:  2023  Date of Discharge:  2023  Admitting Physician:  Yuriy Benitez MD  Discharge Physician:  Fabio Gruber MD  Discharging Service:  Obstetrics and Gynecology     Primary Provider:   Shauna Covarrubias          Admission Diagnoses:     Normal labor and delivery [O80]          Discharge Diagnosis:     1. 28 year old  female at 40w2d, now delivered.  2. PPD# 2 s/p Spontaneous vaginal delivery, doing well.  3. Rh negative  4. Nursing             Discharge Disposition:     Discharged to home.           Condition on Discharge:     Discharge condition: Stable   Discharge vitals: /55 (Cuff Size: Adult Regular)   Pulse 81   Temp 98  F (36.7  C) (Oral)   Resp 16   Ht 1.575 m (5' 2\")   Wt 74.8 kg (165 lb)   LMP 2022   SpO2 98%   Breastfeeding Unknown   BMI 30.18 kg/m     Code status on discharge: Full Code          Brief History of Illness:     Alice Kelly is a 28 year old female who was admitted for induction of labor. Please see her admit H&P for full details of her PMH, PSH, Meds, Allergies and exam on admission.          Procedure Performed:     Normal spontaneous vaginal delivery  Epidural analgesia         Infant Delivery Information:     40w2d at time of delivery.  Delivery Date: 2023  Sex: Female  Weight: 3240 gm  APGAR 1 min: 8  APGAR 5 min: 9         Hospital Course:     Alice Kelly is a 28 year old female who was admitted to the hospital for the above listed indications. Her labor progressed. She had SROM. She had a spontaneous vaginal delivery. Please see her Delivery Summary for full details regarding her delivery. She had an uncomplicated postpartum course. Breastfeeding well. Infant is stable. She will be discharged home in good condition on PPD# 2. Her hemoglobin is 11.8. Her Blood type and Rh status is AB " Negative, and Rhogam was not indicated as infant was Rh negative.          Post-Partum Complications:     None.         Contraception:     The patient is undecided for contraception. To be discussed at Postpartum visit.         Medications Prior to Admission:     Medications Prior to Admission   Medication Sig Dispense Refill Last Dose    albuterol (PROAIR HFA/PROVENTIL HFA/VENTOLIN HFA) 108 (90 Base) MCG/ACT inhaler Inhale 2 puffs into the lungs every 6 hours as needed for shortness of breath, wheezing or cough 18 g 1 More than a month    budesonide-formoterol (SYMBICORT) 80-4.5 MCG/ACT Inhaler Inhale 2 puffs into the lungs every 4 hours as needed (cough, shortness of breath, wheezing) 1 Inhaler 1 More than a month    Prenatal Vit-Fe Fumarate-FA (PRENATAL MULTIVITAMIN W/IRON) 27-0.8 MG tablet Take 1 tablet by mouth daily   8/16/2023 at 2100    [DISCONTINUED] ondansetron (ZOFRAN ODT) 4 MG ODT tab Take 1 tablet (4 mg) by mouth every 8 hours as needed for nausea 10 tablet 0 Past Month             Discharge Medications:     Current Discharge Medication List        START taking these medications    Details   acetaminophen (TYLENOL) 325 MG tablet Take 2 tablets (650 mg) by mouth every 4 hours as needed for mild pain, fever or headaches  Qty: 100 tablet, Refills: 0    Associated Diagnoses: Normal labor and delivery      docusate sodium (COLACE) 100 MG capsule Take 1 capsule (100 mg) by mouth daily  Qty: 30 capsule, Refills: 0    Associated Diagnoses: Normal labor and delivery      ibuprofen (ADVIL/MOTRIN) 800 MG tablet Take 1 tablet (800 mg) by mouth every 6 hours as needed for other, moderate pain or fever (cramping)  Qty: 100 tablet, Refills: 0    Associated Diagnoses: Normal labor and delivery           CONTINUE these medications which have NOT CHANGED    Details   albuterol (PROAIR HFA/PROVENTIL HFA/VENTOLIN HFA) 108 (90 Base) MCG/ACT inhaler Inhale 2 puffs into the lungs every 6 hours as needed for shortness of  breath, wheezing or cough  Qty: 18 g, Refills: 1    Comments: Pharmacy may dispense brand covered by insurance (Proair, or proventil or ventolin or generic albuterol inhaler)  Associated Diagnoses: Mild intermittent asthma without complication      budesonide-formoterol (SYMBICORT) 80-4.5 MCG/ACT Inhaler Inhale 2 puffs into the lungs every 4 hours as needed (cough, shortness of breath, wheezing)  Qty: 1 Inhaler, Refills: 1    Associated Diagnoses: Mild intermittent asthma without complication      Prenatal Vit-Fe Fumarate-FA (PRENATAL MULTIVITAMIN W/IRON) 27-0.8 MG tablet Take 1 tablet by mouth daily           STOP taking these medications       ondansetron (ZOFRAN ODT) 4 MG ODT tab Comments:   Reason for Stopping:                     Consultations:     No consultations were requested during this admission          Significant Results:     None.             Pending Results:     None.           Discharge Instructions and Follow-Up:     Discharge Diet: Regular   Discharge Activity: Increase activity as tolerated. No vigorous activity or exercise for 2 weeks.    Pelvic rest for 6 weeks which includes intercourse, douching and tampons.   Discharge Nursing: Nursing is encouraged.    Schedule outpatient lactation follow up in 2-4 days.   Discharge Instructions: The warning signs of postpartum depression have been reviewed, and the patient is aware that this can be a common occurrence. She is encouraged to seek immediate medical evaluation and assistance for any questions or concerns.    Instructions on avoiding constipation and straining are discussed. The patient is advised in the appropriate use of stool softeners to avoid constipation.    The patient will call the OB/GYN Clinic Nurse at [phone] for problems such as fever (temperature >100.4), chills, pain not controlled by usual oral pain medications, persistent nausea and vomiting, constipation, difficulty nursing, heavy odorous vaginal discharge, burning or pain  associated with urination.  Also call for excessive vaginal bleeding, saturating more than one pad an hour for more than three consecutive hours.   Discharge Follow-up: Follow up for outpatient lactation appointment in 2-4 days if desired.    Follow up for postpartum exam in 2 weeks with Dr. Benitez  or Lo Ann NP.    Follow up for postpartum exam in 6 weeks with Dr. Benitez.    Follow up with Primary Care Provider as scheduled for management of active medical problems and for adult preventive services.    Call Dr. Gruber at the OB/GYN Clinic at [phone] as necessary if you have problems in the interim.     Total time spent for discharge on date of discharge: 45 minutes  I saw the patient on the date of discharge.    Fabio Gruber MD  Obstetrics and Gynecology

## 2023-08-19 NOTE — PLAN OF CARE
Assessments completed as charted. B/P: 109/55, T: 98, P: 81, R: 16. Rates pain: 0/10 . Voiding without difficulty. Fundus: Midline @U-2. Lochia: Light. Activity: normal activity. Infant feeding: Breast feeding going well.     Postpartum breastfeeding assessment completed and education provided, see Patient Education Activity.  Items included in the education are:   proper positioning and latch  effectiveness of feeding  manual expression  handling and storing breastmilk  maintenance of breastfeeding for the first 6 months  sign/symptoms of infant feeding issues requiring referral to qualified health care provider  Postpartum care education provided, see Patient Education activity. Patient denies needs. Will monitor.  Rosie Jackson RN

## 2023-08-22 ENCOUNTER — HOSPITAL ENCOUNTER (OUTPATIENT)
Dept: OBGYN | Facility: HOSPITAL | Age: 28
Discharge: HOME | End: 2023-08-22
Attending: OBSTETRICS & GYNECOLOGY | Admitting: OBSTETRICS & GYNECOLOGY
Payer: COMMERCIAL

## 2023-08-22 PROCEDURE — G0463 HOSPITAL OUTPT CLINIC VISIT: HCPCS

## 2023-08-22 NOTE — LACTATION NOTE
Outpatient Lactation Visit    Alice Kelly                                                                                                   [id number]      Consultation Date: 2023     Reason for Lactation Referral: Routine follow-up    Baby's :23    Baby's Current Age: 5 d  Baby's Gestation Age: 40w 2d    Primary Care Provider: Dr. Coyne    History of Present Illness: none    MATERNAL HISTORY   History of Breast Surgery: No.  Breast Changes During Pregnancy: Yes.  Breast Feeding History: Yes,  successful, Length of Time: 13 months  Maternal Meds:PNV, Vit D and Colace  Pregnancy complications: none  Delivery: with no complications    MATERNAL ASSESSMENT    Breast Size: average, symmetrical, soft after feeding and filling prior to feeding  Nipple Appearance - Left: intact with no breakdown noted  Nipple Appearance - Right: intact with breakdown noted  Nipple Erectility - Left: erect with stimulation  Nipple Erectility - Right: erect with stimulation  Areolas Compressibility: soft  Nipple Size: average  Milk Supply: mature    INFANT ASSESSMENT    Oral Anatomy  Mouth: normal  Palate: normal  Jaw: normal  Tongue: normal  Frenulum: normal   Digital Suck Exam: root    FEEDING   Feeding Time: 1208 for 17 minutes  Position: left breast, right breast, modified cradle  Effort to Latch: awake and alert, latched easily  Duration of Breast Feeding: Right Breast: 9; Left Breast: 8  Results: excellent breast feed    Volume of Intake:  Birth Weight: 7 lb 2.3 oz  Discharge from Hospital after delivery weight: 6 lb 12.3 oz   TODAY 2023  Pre-Weight: 7 lb 1.5 oz  Post-Weight: 7 lb 2.5 oz  Total Intake: 1 oz  Output: 6 soil diapers in last 24 hrs, 6 wet diapers in last 24 hrs    LATCH SCORE:  Latch:2 - Good Latch  Audible Swallowin - Spontaneous & frequent  Type of Nipple:2 - Everted  Comfort:2 - Soft, Nontender  Hold:2 - No Assist  Total LATCH Score:10/10    Babe to the breast following a naked pre  feed weight. Mom positioned and latched babe in in cross cradle hold on right breast with the use of the nursing pillow. Once on good sucking/swallowing noted for 9 min. Babe asleep at the breast. Mom unlatched babe and burped. Offered the left breast and babe nursed x8 min. Unlatched self and appears satisfied. Burped and post feed weight obtained. Education reviewed with mom and all questions answered.     FEEDING PLAN    Home Feeding Plan: Continue to feed on demand when  elicits feeding cues with deep latch.  Exclusivity explained and encouraged in the early weeks to establish breastfeeding and order in milk supply.  Rooming-in encouraged with explanation of the benefits.  Continue to apply expressed breast milk and Lanolin cream to nipples after feedings for healing and comfort.  Postpartum breastfeeding assessment completed and education provided.  Items included in the education are:   proper positioning and latch  maternal nutritional needs  s/s of plugged ducts/mastitis  Feeding cues  sterilization and cleaning of pumping materials and bottles  effectiveness of feeding  manual expression  handling and storing breastmilk  maintenance of breastfeeding for the first 6 months  sign/symptoms of infant feeding issues requiring referral to qualified health care provider    LACTATION COMMENTS   Deep latch explained for proper positioning of breast in infant's mouth, maximizing milk transfer and comfort.  Hand expression taught and return demonstration observed with mature milk present.  Reassurance and encouragement provided in regard to mom's concerns about milk supply.  Follow-up support information provided.  Parents plan to keep [place] Well-Child Check with Dr. Coyne today at 1pm for further support and monitoring.      Face-to-face Time: 30 minutes with assessment and education.    Hilda Mason RN, RNC-OB, IBCLC  Lactation Consultant  [facility]

## 2023-09-01 ENCOUNTER — PRENATAL OFFICE VISIT (OUTPATIENT)
Dept: OBGYN | Facility: OTHER | Age: 28
End: 2023-09-01
Attending: NURSE PRACTITIONER
Payer: COMMERCIAL

## 2023-09-01 VITALS
HEIGHT: 62 IN | DIASTOLIC BLOOD PRESSURE: 70 MMHG | SYSTOLIC BLOOD PRESSURE: 112 MMHG | BODY MASS INDEX: 27.84 KG/M2 | WEIGHT: 151.3 LBS

## 2023-09-01 PROCEDURE — G0463 HOSPITAL OUTPT CLINIC VISIT: HCPCS

## 2023-09-01 PROCEDURE — 99207 PR NO CHARGE LOS: CPT | Performed by: NURSE PRACTITIONER

## 2023-09-01 ASSESSMENT — PAIN SCALES - GENERAL: PAINLEVEL: NO PAIN (0)

## 2023-09-05 NOTE — PROGRESS NOTES
"SUBJECTIVE:  Alice Kelly is a 28 year old female P2 here for a 2 week postpartum visit.  She had a  on 23 delivering a healthy baby girl (Adri) at term.      Today's Depression Rating was 3    delivery complications:  No  breast feeding:  Yes, Doing well.  Denies concerns.   bladder problems:  No  bowel problems/hemorrhoids:  No  episiotomy/laceration/incision healed? Yes: Denies pain.   vaginal flow:  occasional light flow  [place]:  No  contraception:  IUD  emotional adjustment:  doing well and happy      OBJECTIVE:  Blood pressure 112/70, height 1.575 m (5' 2\"), weight 68.6 kg (151 lb 4.8 oz), last menstrual period 2022, unknown if currently breastfeeding.   General - pleasant female in no acute distress.      ASSESSMENT:  normal 2 week postpartum exam    PLAN:  Discussed kegel exercises   Reviewed signs of PPD  Continue PP restrictions.  Return at 6 weeks as scheduled.   "

## 2023-09-19 NOTE — PROGRESS NOTES
Fetal Non-Stress Test Results    NST Ordered By: Dr. Benitez  NST Medical Indication: R/O  labor (R/O  labor)    NST Start & Stop Times  NST Start Time: 1100  NST Stop Time: 1120                            NST Results  Fetus A   Baseline Rate: Normal  Accelerations: Present  Decelerations: None  Interpretation: reactive

## 2023-10-03 ENCOUNTER — PRENATAL OFFICE VISIT (OUTPATIENT)
Dept: OBGYN | Facility: OTHER | Age: 28
End: 2023-10-03
Attending: OBSTETRICS & GYNECOLOGY
Payer: COMMERCIAL

## 2023-10-03 VITALS
DIASTOLIC BLOOD PRESSURE: 68 MMHG | WEIGHT: 152 LBS | OXYGEN SATURATION: 97 % | SYSTOLIC BLOOD PRESSURE: 112 MMHG | HEIGHT: 62 IN | HEART RATE: 53 BPM | BODY MASS INDEX: 27.97 KG/M2

## 2023-10-03 DIAGNOSIS — Z30.430 ENCOUNTER FOR INSERTION OF INTRAUTERINE CONTRACEPTIVE DEVICE (IUD): Primary | ICD-10-CM

## 2023-10-03 PROBLEM — Z34.82 NORMAL PREGNANCY IN MULTIGRAVIDA IN SECOND TRIMESTER: Status: RESOLVED | Noted: 2023-01-31 | Resolved: 2023-10-03

## 2023-10-03 PROCEDURE — 58300 INSERT INTRAUTERINE DEVICE: CPT | Performed by: OBSTETRICS & GYNECOLOGY

## 2023-10-03 PROCEDURE — 250N000011 HC RX IP 250 OP 636: Performed by: OBSTETRICS & GYNECOLOGY

## 2023-10-03 PROCEDURE — G0463 HOSPITAL OUTPT CLINIC VISIT: HCPCS | Mod: 25

## 2023-10-03 PROCEDURE — 99207 PR POST PARTUM EXAM: CPT | Performed by: OBSTETRICS & GYNECOLOGY

## 2023-10-03 RX ADMIN — LEVONORGESTREL 1 EACH: 52 INTRAUTERINE DEVICE INTRAUTERINE at 13:51

## 2023-10-03 ASSESSMENT — PAIN SCALES - GENERAL: PAINLEVEL: NO PAIN (0)

## 2023-10-03 NOTE — PROGRESS NOTES
"SUBJECTIVE:  Alice Kelly is a 28 year old female P2 here for a postpartum visit.  She had a   delivering a healthy baby girl  Currently no complaints and doing well.    Today's Depression Rating was 1    delivery complications:  No  breast feeding:  Yes  bladder problems:  No  bowel problems/hemorrhoids:  No  episiotomy/laceration/incision healed? Yes  vaginal flow:  None  [place]:  No  contraception:  abstinence  emotional adjustment:  doing well and happy      OBJECTIVE:  Blood pressure 112/68, pulse 53, height 1.575 m (5' 2\"), weight 68.9 kg (152 lb), last menstrual period 2022, SpO2 97 %, currently breastfeeding.   General - pleasant female in no acute distress.  Abdomen - soft, nontender, nondistended, no hepatosplenomegaly.  Pelvic - EG: normal adult female, BUS: within normal limits, Vagina: well rugated, no discharge, Cervix: no lesions or CMT, Uterus: firm, normal sized and nontender,   Rectovaginal - deferred.    IUD insertion:  Type:  Mirena  Risk, benefits and alternatives were  discussed and literature given to patient and all questions answered.  Pt desires to proceed.  Consent form reviewed and signed.    Cervix visualized with speculum  Betadaine prep   Tenaculum placed anterior cervix  Uterus sounded to 8.5 cm   Mirena inserted in usual fashion  String cut 3cm.  No complications.  Patient tolerated procedure well     ASSESSMENT:  normal postpartum exam.  Released from pregnancy related restrictions  Mirena IUD insertion.  Instructed on checking strings.     PLAN:  May resume normal activities without restrictions  Pap smear Not Done today    The patient will use IUD for birth control. Full counseling was provided, and all questions answered. Compliance is strongly emphasized.  Return to clinic in one year for an annual, when due for a pap smear or PRN if unable to feel IUD strings.     Yuriy Benitez MD   "

## 2023-10-18 ENCOUNTER — MEDICAL CORRESPONDENCE (OUTPATIENT)
Dept: HEALTH INFORMATION MANAGEMENT | Facility: HOSPITAL | Age: 28
End: 2023-10-18

## 2023-12-19 ENCOUNTER — MEDICAL CORRESPONDENCE (OUTPATIENT)
Dept: HEALTH INFORMATION MANAGEMENT | Facility: HOSPITAL | Age: 28
End: 2023-12-19

## 2024-02-22 ENCOUNTER — MEDICAL CORRESPONDENCE (OUTPATIENT)
Dept: HEALTH INFORMATION MANAGEMENT | Facility: HOSPITAL | Age: 29
End: 2024-02-22

## 2024-06-17 PROBLEM — Z71.89 ADVANCED DIRECTIVES, COUNSELING/DISCUSSION: Status: RESOLVED | Noted: 2021-12-08 | Resolved: 2024-06-17

## 2024-07-06 ENCOUNTER — HEALTH MAINTENANCE LETTER (OUTPATIENT)
Age: 29
End: 2024-07-06

## 2024-10-11 ENCOUNTER — APPOINTMENT (OUTPATIENT)
Dept: OCCUPATIONAL MEDICINE | Facility: OTHER | Age: 29
End: 2024-10-11

## 2024-10-15 ENCOUNTER — APPOINTMENT (OUTPATIENT)
Dept: OCCUPATIONAL MEDICINE | Facility: OTHER | Age: 29
End: 2024-10-15

## 2024-11-20 ENCOUNTER — APPOINTMENT (OUTPATIENT)
Dept: OCCUPATIONAL MEDICINE | Facility: OTHER | Age: 29
End: 2024-11-20

## 2025-04-15 ENCOUNTER — APPOINTMENT (OUTPATIENT)
Dept: OCCUPATIONAL MEDICINE | Facility: OTHER | Age: 30
End: 2025-04-15

## 2025-05-08 ASSESSMENT — ASTHMA QUESTIONNAIRES
QUESTION_2 LAST FOUR WEEKS HOW OFTEN HAVE YOU HAD SHORTNESS OF BREATH: NOT AT ALL
ACT_TOTALSCORE: 25
QUESTION_5 LAST FOUR WEEKS HOW WOULD YOU RATE YOUR ASTHMA CONTROL: COMPLETELY CONTROLLED
QUESTION_4 LAST FOUR WEEKS HOW OFTEN HAVE YOU USED YOUR RESCUE INHALER OR NEBULIZER MEDICATION (SUCH AS ALBUTEROL): NOT AT ALL
QUESTION_1 LAST FOUR WEEKS HOW MUCH OF THE TIME DID YOUR ASTHMA KEEP YOU FROM GETTING AS MUCH DONE AT WORK, SCHOOL OR AT HOME: NONE OF THE TIME
QUESTION_3 LAST FOUR WEEKS HOW OFTEN DID YOUR ASTHMA SYMPTOMS (WHEEZING, COUGHING, SHORTNESS OF BREATH, CHEST TIGHTNESS OR PAIN) WAKE YOU UP AT NIGHT OR EARLIER THAN USUAL IN THE MORNING: NOT AT ALL

## 2025-05-12 ENCOUNTER — OFFICE VISIT (OUTPATIENT)
Dept: FAMILY MEDICINE | Facility: OTHER | Age: 30
End: 2025-05-12
Attending: STUDENT IN AN ORGANIZED HEALTH CARE EDUCATION/TRAINING PROGRAM
Payer: COMMERCIAL

## 2025-05-12 VITALS
DIASTOLIC BLOOD PRESSURE: 74 MMHG | HEIGHT: 62 IN | TEMPERATURE: 98.4 F | SYSTOLIC BLOOD PRESSURE: 110 MMHG | WEIGHT: 149.9 LBS | OXYGEN SATURATION: 100 % | HEART RATE: 73 BPM | BODY MASS INDEX: 27.58 KG/M2 | RESPIRATION RATE: 18 BRPM

## 2025-05-12 DIAGNOSIS — Z01.818 PREOP GENERAL PHYSICAL EXAM: Primary | ICD-10-CM

## 2025-05-12 DIAGNOSIS — Z83.49 FAMILY HISTORY OF HYPOTHYROIDISM: ICD-10-CM

## 2025-05-12 DIAGNOSIS — Z23 ENCOUNTER FOR IMMUNIZATION: ICD-10-CM

## 2025-05-12 DIAGNOSIS — Z13.220 SCREENING FOR LIPID DISORDERS: ICD-10-CM

## 2025-05-12 DIAGNOSIS — L98.9 SKIN LESION: ICD-10-CM

## 2025-05-12 DIAGNOSIS — J45.20 MILD INTERMITTENT ASTHMA WITHOUT COMPLICATION: Chronic | ICD-10-CM

## 2025-05-12 PROBLEM — Z67.91 RH NEGATIVE, ANTEPARTUM: Chronic | Status: ACTIVE | Noted: 2021-08-30

## 2025-05-12 PROBLEM — L50.8 CHRONIC URTICARIA: Status: RESOLVED | Noted: 2020-04-17 | Resolved: 2025-05-12

## 2025-05-12 PROBLEM — O26.899 RH NEGATIVE, ANTEPARTUM: Chronic | Status: ACTIVE | Noted: 2021-08-30

## 2025-05-12 RX ORDER — CETIRIZINE HYDROCHLORIDE 10 MG/1
TABLET ORAL
COMMUNITY

## 2025-05-12 ASSESSMENT — ANXIETY QUESTIONNAIRES
7. FEELING AFRAID AS IF SOMETHING AWFUL MIGHT HAPPEN: NOT AT ALL
5. BEING SO RESTLESS THAT IT IS HARD TO SIT STILL: NOT AT ALL
7. FEELING AFRAID AS IF SOMETHING AWFUL MIGHT HAPPEN: NOT AT ALL
1. FEELING NERVOUS, ANXIOUS, OR ON EDGE: SEVERAL DAYS
GAD7 TOTAL SCORE: 3
GAD7 TOTAL SCORE: 3
3. WORRYING TOO MUCH ABOUT DIFFERENT THINGS: SEVERAL DAYS
IF YOU CHECKED OFF ANY PROBLEMS ON THIS QUESTIONNAIRE, HOW DIFFICULT HAVE THESE PROBLEMS MADE IT FOR YOU TO DO YOUR WORK, TAKE CARE OF THINGS AT HOME, OR GET ALONG WITH OTHER PEOPLE: SOMEWHAT DIFFICULT
8. IF YOU CHECKED OFF ANY PROBLEMS, HOW DIFFICULT HAVE THESE MADE IT FOR YOU TO DO YOUR WORK, TAKE CARE OF THINGS AT HOME, OR GET ALONG WITH OTHER PEOPLE?: SOMEWHAT DIFFICULT
6. BECOMING EASILY ANNOYED OR IRRITABLE: NOT AT ALL
2. NOT BEING ABLE TO STOP OR CONTROL WORRYING: SEVERAL DAYS
4. TROUBLE RELAXING: NOT AT ALL
GAD7 TOTAL SCORE: 3

## 2025-05-12 ASSESSMENT — ENCOUNTER SYMPTOMS
MYALGIAS: 0
WHEEZING: 0
WOUND: 0
COUGH: 0
SHORTNESS OF BREATH: 0
CHILLS: 0
DIZZINESS: 0
CONSTIPATION: 0
PALPITATIONS: 0
FEVER: 0
FATIGUE: 0
SINUS PRESSURE: 0
ABDOMINAL PAIN: 0
DYSURIA: 0
SORE THROAT: 0
VOMITING: 0
DIARRHEA: 0

## 2025-05-12 ASSESSMENT — PATIENT HEALTH QUESTIONNAIRE - PHQ9
SUM OF ALL RESPONSES TO PHQ QUESTIONS 1-9: 0
10. IF YOU CHECKED OFF ANY PROBLEMS, HOW DIFFICULT HAVE THESE PROBLEMS MADE IT FOR YOU TO DO YOUR WORK, TAKE CARE OF THINGS AT HOME, OR GET ALONG WITH OTHER PEOPLE: NOT DIFFICULT AT ALL
SUM OF ALL RESPONSES TO PHQ QUESTIONS 1-9: 0

## 2025-05-12 ASSESSMENT — PAIN SCALES - GENERAL: PAINLEVEL_OUTOF10: NO PAIN (0)

## 2025-05-12 NOTE — PROGRESS NOTES
Preoperative Evaluation  [facility] [place]  [address]  [place] [address]  Phone: [phone]  Primary Provider: Shauna Covarrubias CNP  Pre-op Performing Provider: Bobbi Cui MD  May 12, 2025         5/8/2025   Surgical Information   What procedure is being done? New Skin- skin procedure     Facility or Hospital where procedure/surgery will be performed: [facility]   Who is doing the procedure / surgery? Dr Elliot Black   Date of surgery / procedure: 6/6/25   Time of surgery / procedure: Not disclosed   Where do you plan to recover after surgery? at home with family     Fax number for surgical facility:     Assessment & Plan     The proposed surgical procedure is considered INTERMEDIATE risk.    Preop general physical exam  CBC and BMP stable.  Functions at over 4 METS.  Optimized for surgery.  - CBC with Platelets & Differential; Future  - HCG qualitative urine; Future  - Basic metabolic panel; Future    Skin lesion  Reason for revision surgery.     Mild intermittent asthma without complication  Stable. Rare symptoms. No daily medication.     Family history of hypothyroidism  - TSH with free T4 reflex; Future  - Thyroid peroxidase antibody; Future    Screening for lipid disorders  - Lipid Profile (Chol, Trig, HDL, LDL calc); Future    Encounter for immunization  - Pneumococcal 20 Valent Conjugate (Prevnar 20)          - No identified additional risk factors other than previously addressed    Antiplatelet or Anticoagulation Medication Instructions   - We reviewed the medication list and the patient is not on an antiplatelet or anticoagulation medications.    Additional Medication Instructions   - Herbal medications and vitamins: DO NOT TAKE 14 days prior to surgery.   - cetirizine and first generation antihistamines: DO NOT TAKE on day of surgery.   - Topicals: DO NOT TAKE day of surgery.    Recommendation  Approval given to proceed with proposed procedure, without further  diagnostic evaluation.        The longitudinal plan of care for the diagnosis(es)/condition(s) as documented were addressed during this visit. Due to the added complexity in care, I will continue to support Alice in the subsequent management and with ongoing continuity of care.    Anjel Jenkins is a 29 year old, presenting for the following:  Pre-Op Exam          5/12/2025     8:17 AM   Additional Questions   Roomed by Bhavana HOANG   Accompanied by self     HPI: Patient is planning revision of a scar from prior mole removal.      Has been feeling well with no fevers, congestion, cough, or other URI symptoms.   No ischemic cardiac history. Denies any shortness of breath, chest pain, or dyspnea on exertion.   Can function at >4 METS.       Asthma      5/8/2025     7:12 AM   ACT Total Scores   ACT TOTAL SCORE (Goal Greater than or Equal to 20) 25    In the past 12 months, how many times did you visit the emergency room for your asthma without being admitted to the hospital? 0   In the past 12 months, how many times were you hospitalized overnight because of your asthma? 0       Patient-reported     Do you have any of the following symptoms? None of these symptoms (cough/noisy breathing/trouble with breathing)  What makes your asthma/breathing worse?  Exercise or sports and Cold air  Do you want more information about how to use your inhaler? No        5/8/2025   Pre-Op Questionnaire   Have you ever had a heart attack or stroke? No   Have you ever had surgery on your heart or blood vessels, such as a stent placement, a coronary artery bypass, or surgery on an artery in your head, neck, heart, or legs? No   Do you have chest pain with activity? No   Do you have a history of heart failure? No   Do you currently have a cold, bronchitis or symptoms of other infection? No   Do you have a cough, shortness of breath, or wheezing? No   Do you or anyone in your family have previous history of blood clots? No   Do you or does  anyone in your family have a serious bleeding problem such as prolonged bleeding following surgeries or cuts? No   Have you ever had problems with anemia or been told to take iron pills? No   Have you had any abnormal blood loss such as black, tarry or bloody stools, or abnormal vaginal bleeding? No   Have you ever had a blood transfusion? No   Are you willing to have a blood transfusion if it is medically needed before, during, or after your surgery? Yes   Have you or any of your relatives ever had problems with anesthesia? No   Do you have sleep apnea, excessive snoring or daytime drowsiness? No   Do you have any artifical heart valves or other implanted medical devices like a pacemaker, defibrillator, or continuous glucose monitor? No   Do you have artificial joints? No   Are you allergic to latex? No     Advance Care Planning  Discussed advance care planning with patient; however, patient declined at this time.    Preoperative Review of    reviewed - no record of controlled substances prescribed.      Status of Chronic Conditions:  ASTHMA - Patient has a longstanding history of mild Asthma . Patient has been doing well overall noting NO SYMPTOMS and continues on medication regimen consisting of as needed albuterol without adverse reactions or side effects.     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 IMO Update 10/11    Eczema 04/15/2011    Mild intermittent asthma without complication 04/17/2020    Normal labor and delivery 08/17/2023    Other acne 03/12/2012  "   Rh negative, antepartum 08/30/2021     Past Surgical History:   Procedure Laterality Date    ENT SURGERY  1998    tubes in ears    PE tube placement  01/01/1997     Current Outpatient Medications   Medication Sig Dispense Refill    albuterol (PROAIR HFA/PROVENTIL HFA/VENTOLIN HFA) 108 (90 Base) MCG/ACT inhaler Inhale 2 puffs into the lungs every 6 hours as needed for shortness of breath, wheezing or cough 18 g 1    budesonide-formoterol (SYMBICORT) 80-4.5 MCG/ACT Inhaler Inhale 2 puffs into the lungs every 4 hours as needed (cough, shortness of breath, wheezing) 1 Inhaler 1    cetirizine (ZYRTEC) 10 MG tablet       Prenatal Vit-Fe Fumarate-FA (PRENATAL MULTIVITAMIN W/IRON) 27-0.8 MG tablet Take 1 tablet by mouth daily (Patient not taking: Reported on 5/12/2025)         Allergies   Allergen Reactions    Sulfa Antibiotics Rash     Sulfa (sulfonamide antibiotics)        Social History     Tobacco Use    Smoking status: Never    Smokeless tobacco: Never   Substance Use Topics    Alcohol use: Not Currently       History   Drug Use Unknown           Review of Systems   Constitutional:  Negative for chills, fatigue and fever.   HENT:  Negative for congestion, sinus pressure and sore throat.    Respiratory:  Negative for cough, shortness of breath and wheezing.    Cardiovascular:  Negative for chest pain, palpitations and leg swelling.   Gastrointestinal:  Negative for abdominal pain, constipation, diarrhea and vomiting.   Genitourinary:  Negative for dysuria and urgency.   Musculoskeletal:  Negative for myalgias.   Skin:  Negative for rash and wound.   Neurological:  Negative for dizziness and syncope.         Objective    /74 (BP Location: Right arm, Patient Position: Sitting, Cuff Size: Adult Regular)   Pulse 73   Temp 98.4  F (36.9  C) (Tympanic)   Resp 18   Ht 1.575 m (5' 2\")   Wt 68 kg (149 lb 14.4 oz)   LMP 05/10/2025 (Exact Date)   SpO2 100%   Breastfeeding No   BMI 27.42 kg/m     Estimated body " "mass index is 27.42 kg/m  as calculated from the following:    Height as of this encounter: 1.575 m (5' 2\").    Weight as of this encounter: 68 kg (149 lb 14.4 oz).  Physical Exam  Constitutional:       General: She is not in acute distress.     Appearance: Normal appearance. She is not ill-appearing.   HENT:      Right Ear: Tympanic membrane and ear canal normal.      Left Ear: Tympanic membrane and ear canal normal.      Nose: Nose normal.      Mouth/Throat:      Mouth: Mucous membranes are moist.      Pharynx: Oropharynx is clear. No oropharyngeal exudate or posterior oropharyngeal erythema.   Eyes:      Extraocular Movements: Extraocular movements intact.      Conjunctiva/sclera: Conjunctivae normal.      Pupils: Pupils are equal, round, and reactive to light.   Neck:      Thyroid: No thyroid mass, thyromegaly or thyroid tenderness.   Cardiovascular:      Rate and Rhythm: Normal rate and regular rhythm.      Heart sounds: No murmur heard.  Pulmonary:      Effort: Pulmonary effort is normal.      Breath sounds: Normal breath sounds. No wheezing, rhonchi or rales.   Abdominal:      General: Bowel sounds are normal.      Palpations: Abdomen is soft. There is no hepatomegaly, splenomegaly, mass or pulsatile mass.      Tenderness: There is no abdominal tenderness. There is no right CVA tenderness, left CVA tenderness, guarding or rebound.   Musculoskeletal:      Cervical back: Neck supple.      Right lower leg: No edema.      Left lower leg: No edema.   Lymphadenopathy:      Cervical: No cervical adenopathy.   Skin:     General: Skin is warm and dry.      Capillary Refill: Capillary refill takes less than 2 seconds.      Findings: No rash.   Neurological:      General: No focal deficit present.      Mental Status: She is alert and oriented to person, place, and time.      Cranial Nerves: No cranial nerve deficit.      Motor: No weakness.      Gait: Gait normal.   Psychiatric:         Mood and Affect: Mood normal.    " "     Behavior: Behavior normal.         Thought Content: Thought content normal.         Judgment: Judgment normal.           No results for input(s): \"HGB\", \"PLT\", \"INR\", \"NA\", \"POTASSIUM\", \"CR\", \"A1C\" in the last 8760 hours.     Diagnostics  Recent Results (from the past 24 hours)   HCG qualitative urine    Collection Time: 05/13/25  8:12 AM   Result Value Ref Range    hCG Urine Qualitative Negative Negative   Basic metabolic panel    Collection Time: 05/13/25  8:12 AM   Result Value Ref Range    Sodium 136 135 - 145 mmol/L    Potassium 4.0 3.4 - 5.3 mmol/L    Chloride 101 98 - 107 mmol/L    Carbon Dioxide (CO2) 23 22 - 29 mmol/L    Anion Gap 12 7 - 15 mmol/L    Urea Nitrogen 11.7 6.0 - 20.0 mg/dL    Creatinine 0.82 0.51 - 0.95 mg/dL    GFR Estimate >90 >60 mL/min/1.73m2    Calcium 9.2 8.8 - 10.4 mg/dL    Glucose 96 70 - 99 mg/dL    Patient Fasting > 8hrs? Yes    TSH with free T4 reflex    Collection Time: 05/13/25  8:12 AM   Result Value Ref Range    TSH 2.23 0.30 - 4.20 uIU/mL   Lipid Profile (Chol, Trig, HDL, LDL calc)    Collection Time: 05/13/25  8:12 AM   Result Value Ref Range    Cholesterol 122 <200 mg/dL    Triglycerides 49 <150 mg/dL    Direct Measure HDL 64 >=50 mg/dL    LDL Cholesterol Calculated 48 <100 mg/dL    Non HDL Cholesterol 58 <130 mg/dL    Patient Fasting > 8hrs? Yes    CBC with platelets and differential    Collection Time: 05/13/25  8:12 AM   Result Value Ref Range    WBC Count 8.4 4.0 - 11.0 10e3/uL    RBC Count 4.47 3.80 - 5.20 10e6/uL    Hemoglobin 13.7 11.7 - 15.7 g/dL    Hematocrit 38.8 35.0 - 47.0 %    MCV 87 78 - 100 fL    MCH 30.6 26.5 - 33.0 pg    MCHC 35.3 31.5 - 36.5 g/dL    RDW 12.7 10.0 - 15.0 %    Platelet Count 240 150 - 450 10e3/uL    % Neutrophils 80 %    % Lymphocytes 9 %    % Monocytes 8 %    % Eosinophils 2 %    % Basophils 1 %    % Immature Granulocytes 0 %    NRBCs per 100 WBC 0 <1 /100    Absolute Neutrophils 6.7 1.6 - 8.3 10e3/uL    Absolute Lymphocytes 0.8 0.8 - 5.3 " 10e3/uL    Absolute Monocytes 0.7 0.0 - 1.3 10e3/uL    Absolute Eosinophils 0.2 0.0 - 0.7 10e3/uL    Absolute Basophils 0.0 0.0 - 0.2 10e3/uL    Absolute Immature Granulocytes 0.0 <=0.4 10e3/uL    Absolute NRBCs 0.0 10e3/uL      No EKG required, no history of coronary heart disease, significant arrhythmia, peripheral arterial disease or other structural heart disease.    Revised Cardiac Risk Index (RCRI)  The patient has the following serious cardiovascular risks for perioperative complications:   - No serious cardiac risks = 0 points     RCRI Interpretation: 0 points: Class I (very low risk - 0.4% complication rate)         Signed Electronically by: Bobbi Cui MD  A copy of this evaluation report is provided to the requesting physician.         Answers submitted by the patient for this visit:  Patient Health Questionnaire (Submitted on 5/12/2025)  If you checked off any problems, how difficult have these problems made it for you to do your work, take care of things at home, or get along with other people?: Not difficult at all  PHQ9 TOTAL SCORE: 0  Patient Health Questionnaire (G7) (Submitted on 5/12/2025)  JELLY 7 TOTAL SCORE: 3

## 2025-05-12 NOTE — LETTER
My Asthma Action Plan    Name: Alice Kelly   YOB: 1995  Date: 5/12/2025   My doctor: Bobbi Cui MD   My clinic: [facility] [place]        My Rescue Medicine:    My Asthma Severity:     Know your asthma triggers:   None          GREEN ZONE   Good Control  I feel good  No cough or wheeze  Can work, sleep and play without asthma symptoms       Take your asthma control medicine every day.     If exercise triggers your asthma, take your rescue medication  15 minutes before exercise or sports, and  During exercise if you have asthma symptoms  Spacer to use with inhaler: If you have a spacer, make sure to use it with your inhaler             YELLOW ZONE Getting Worse  I have ANY of these:  I do not feel good  Cough or wheeze  Chest feels tight  Wake up at night   Keep taking your Green Zone medications  Start taking your rescue medicine:  every 20 minutes for up to 1 hour. Then every 4 hours for 24-48 hours.  If you stay in the Yellow Zone for more than 12-24 hours, contact your doctor.  If you do not return to the Green Zone in 12-24 hours or you get worse, start taking your oral steroid medicine if prescribed by your provider.           RED ZONE Medical Alert - Get Help  I have ANY of these:  I feel awful  Medicine is not helping  Breathing getting harder  Trouble walking or talking  Nose opens wide to breathe       Take your rescue medicine NOW  If your provider has prescribed an oral steroid medicine, start taking it NOW  Call your doctor NOW  If you are still in the Red Zone after 20 minutes and you have not reached your doctor:  Take your rescue medicine again and  Call 911 or go to the emergency room right away    See your regular doctor within 2 weeks of an Emergency Room or Urgent Care visit for follow-up treatment.          Annual Reminders:  Meet with Asthma Educator,  Flu Shot in the Fall, consider Pneumonia Vaccination for patients with asthma (aged 19 and  older).    Pharmacy: [address]    Electronically signed by Bobbi Cui MD   Date: 05/12/25                    Asthma Triggers  How To Control Things That Make Your Asthma Worse    Triggers are things that make your asthma worse.  Look at the list below to help you find your triggers and   what you can do about them. You can help prevent asthma flare-ups by staying away from your triggers.      Trigger                                                          What you can do   Cigarette Smoke  Tobacco smoke can make asthma worse. Do not allow smoking in your home, car or around you.  Be sure no one smokes at a child s day care or school.  If you smoke, ask your health care provider for ways to help you quit.  Ask family members to quit too.  Ask your health care provider for a referral to Quit Plan to help you quit smoking, or call 6-430-154-PLAN.     Colds, Flu, Bronchitis  These are common triggers of asthma. Wash your hands often.  Don t touch your eyes, nose or mouth.  Get a flu shot every year.     Dust Mites  These are tiny bugs that live in cloth or carpet. They are too small to see. Wash sheets and blankets in hot water every week.   Encase pillows and mattress in dust mite proof covers.  Avoid having carpet if you can. If you have carpet, vacuum weekly.   Use a dust mask and HEPA vacuum.   Pollen and Outdoor Mold  Some people are allergic to trees, grass, or weed pollen, or molds. Try to keep your windows closed.  Limit time out doors when pollen count is high.   Ask you health care provider about taking medicine during allergy season.     Animal Dander  Some people are allergic to skin flakes, urine or saliva from pets with fur or feathers. Keep pets with fur or feathers out of your home.    If you can t keep the pet outdoors, then keep the pet out of your bedroom.  Keep the bedroom door closed.  Keep pets off cloth furniture and away from stuffed toys.     Mice,  Rats, and Cockroaches  Some people are allergic to the waste from these pests.   Cover food and garbage.  Clean up spills and food crumbs.  Store grease in the refrigerator.   Keep food out of the bedroom.   Indoor Mold  This can be a trigger if your home has high moisture. Fix leaking faucets, pipes, or other sources of water.   Clean moldy surfaces.  Dehumidify basement if it is damp and smelly.   Smoke, Strong Odors, and Sprays  These can reduce air quality. Stay away from strong odors and sprays, such as perfume, powder, hair spray, paints, smoke incense, paint, cleaning products, candles and new carpet.   Exercise or Sports  Some people with asthma have this trigger. Be active!  Ask your doctor about taking medicine before sports or exercise to prevent symptoms.    Warm up for 5-10 minutes before and after sports or exercise.     Other Triggers of Asthma  Cold air:  Cover your nose and mouth with a scarf.  Sometimes laughing or crying can be a trigger.  Some medicines and food can trigger asthma.

## 2025-05-12 NOTE — PATIENT INSTRUCTIONS
Before Your Surgery     Call your surgeon if there is any change in your health. This includes signs of a cold or flu (such as a sore throat, runny nose, cough, rash, fever, urinary symptoms).   If you take prescribed drugs: Follow your doctor s orders about which medicines to take and which to stop until after surgery.  HOLD vitamins and supplements two weeks prior to surgery (multivitamin, calcium, b12, over the counter supplements).   Hold zyrtec day of surgery  Bring albuterol to surgery   Do not smoke, drink alcohol or take over the counter medicine (unless your surgeon or primary care doctor tells you to) for the 24 hours before and after surgery. Smoking can increase your risk of an infection. Nicotine patches are safe to use. NSAIDS like ibuprofen (Advil, Motrin) should be held one day before surgery. Celebrex should be held 3 days before surgery. Naproxen (Aleve) should be held four days before surgery.   Do not take supplements/vitamins day prior and morning of surgery. Some supplements/vitamins can interfere with anesthesia.   Eating and drinking prior to surgery: follow the instructions from your surgeon  Take a shower or bath the night before surgery and morning of surgery. Use the soap your surgeon gave you to gently clean your skin night before and morning of surgery. If you do not have soap from your surgeon, use your regular soap.[place] patients can receive free Chlorhexidine Gluconate 4% soap for surgery at an outpatient [place] pharmacy.  Do not shave the surgery site.  Wear clean pajamas and have clean sheets on your bed.  Brush teeth morning of surgery to reduce risk of infection.

## 2025-05-13 ENCOUNTER — LAB (OUTPATIENT)
Dept: LAB | Facility: OTHER | Age: 30
End: 2025-05-13
Payer: COMMERCIAL

## 2025-05-13 ENCOUNTER — RESULTS FOLLOW-UP (OUTPATIENT)
Dept: FAMILY MEDICINE | Facility: OTHER | Age: 30
End: 2025-05-13

## 2025-05-13 ENCOUNTER — TELEPHONE (OUTPATIENT)
Dept: FAMILY MEDICINE | Facility: OTHER | Age: 30
End: 2025-05-13

## 2025-05-13 DIAGNOSIS — Z01.818 PREOP GENERAL PHYSICAL EXAM: ICD-10-CM

## 2025-05-13 DIAGNOSIS — Z83.49 FAMILY HISTORY OF HYPOTHYROIDISM: ICD-10-CM

## 2025-05-13 DIAGNOSIS — Z13.220 SCREENING FOR LIPID DISORDERS: ICD-10-CM

## 2025-05-13 LAB
ANION GAP SERPL CALCULATED.3IONS-SCNC: 12 MMOL/L (ref 7–15)
BASOPHILS # BLD AUTO: 0 10E3/UL (ref 0–0.2)
BASOPHILS NFR BLD AUTO: 1 %
BUN SERPL-MCNC: 11.7 MG/DL (ref 6–20)
CALCIUM SERPL-MCNC: 9.2 MG/DL (ref 8.8–10.4)
CHLORIDE SERPL-SCNC: 101 MMOL/L (ref 98–107)
CHOLEST SERPL-MCNC: 122 MG/DL
CREAT SERPL-MCNC: 0.82 MG/DL (ref 0.51–0.95)
EGFRCR SERPLBLD CKD-EPI 2021: >90 ML/MIN/1.73M2
EOSINOPHIL # BLD AUTO: 0.2 10E3/UL (ref 0–0.7)
EOSINOPHIL NFR BLD AUTO: 2 %
ERYTHROCYTE [DISTWIDTH] IN BLOOD BY AUTOMATED COUNT: 12.7 % (ref 10–15)
FASTING STATUS PATIENT QL REPORTED: YES
FASTING STATUS PATIENT QL REPORTED: YES
GLUCOSE SERPL-MCNC: 96 MG/DL (ref 70–99)
HCG UR QL: NEGATIVE
HCO3 SERPL-SCNC: 23 MMOL/L (ref 22–29)
HCT VFR BLD AUTO: 38.8 % (ref 35–47)
HDLC SERPL-MCNC: 64 MG/DL
HGB BLD-MCNC: 13.7 G/DL (ref 11.7–15.7)
IMM GRANULOCYTES # BLD: 0 10E3/UL
IMM GRANULOCYTES NFR BLD: 0 %
LDLC SERPL CALC-MCNC: 48 MG/DL
LYMPHOCYTES # BLD AUTO: 0.8 10E3/UL (ref 0.8–5.3)
LYMPHOCYTES NFR BLD AUTO: 9 %
MCH RBC QN AUTO: 30.6 PG (ref 26.5–33)
MCHC RBC AUTO-ENTMCNC: 35.3 G/DL (ref 31.5–36.5)
MCV RBC AUTO: 87 FL (ref 78–100)
MONOCYTES # BLD AUTO: 0.7 10E3/UL (ref 0–1.3)
MONOCYTES NFR BLD AUTO: 8 %
NEUTROPHILS # BLD AUTO: 6.7 10E3/UL (ref 1.6–8.3)
NEUTROPHILS NFR BLD AUTO: 80 %
NONHDLC SERPL-MCNC: 58 MG/DL
NRBC # BLD AUTO: 0 10E3/UL
NRBC BLD AUTO-RTO: 0 /100
PLATELET # BLD AUTO: 240 10E3/UL (ref 150–450)
POTASSIUM SERPL-SCNC: 4 MMOL/L (ref 3.4–5.3)
RBC # BLD AUTO: 4.47 10E6/UL (ref 3.8–5.2)
SODIUM SERPL-SCNC: 136 MMOL/L (ref 135–145)
TRIGL SERPL-MCNC: 49 MG/DL
TSH SERPL DL<=0.005 MIU/L-ACNC: 2.23 UIU/ML (ref 0.3–4.2)
WBC # BLD AUTO: 8.4 10E3/UL (ref 4–11)

## 2025-05-13 PROCEDURE — 80048 BASIC METABOLIC PNL TOTAL CA: CPT

## 2025-05-13 PROCEDURE — 84443 ASSAY THYROID STIM HORMONE: CPT

## 2025-05-13 PROCEDURE — 80061 LIPID PANEL: CPT

## 2025-05-13 PROCEDURE — 36415 COLL VENOUS BLD VENIPUNCTURE: CPT

## 2025-05-13 PROCEDURE — 81025 URINE PREGNANCY TEST: CPT

## 2025-05-13 PROCEDURE — 85025 COMPLETE CBC W/AUTO DIFF WBC: CPT

## 2025-05-13 PROCEDURE — 86376 MICROSOMAL ANTIBODY EACH: CPT

## 2025-05-14 LAB — THYROPEROXIDASE AB SERPL-ACNC: <10 IU/ML
